# Patient Record
Sex: FEMALE | Race: BLACK OR AFRICAN AMERICAN | NOT HISPANIC OR LATINO | Employment: OTHER | URBAN - METROPOLITAN AREA
[De-identification: names, ages, dates, MRNs, and addresses within clinical notes are randomized per-mention and may not be internally consistent; named-entity substitution may affect disease eponyms.]

---

## 2017-06-04 ENCOUNTER — HOSPITAL ENCOUNTER (EMERGENCY)
Facility: CLINIC | Age: 37
Discharge: HOME | End: 2017-06-05
Attending: EMERGENCY MEDICINE | Admitting: EMERGENCY MEDICINE

## 2017-06-04 ENCOUNTER — APPOINTMENT (OUTPATIENT)
Dept: GENERAL RADIOLOGY | Facility: CLINIC | Age: 37
End: 2017-06-04
Attending: EMERGENCY MEDICINE

## 2017-06-04 VITALS
RESPIRATION RATE: 16 BRPM | TEMPERATURE: 98.5 F | HEIGHT: 70 IN | SYSTOLIC BLOOD PRESSURE: 128 MMHG | BODY MASS INDEX: 28.63 KG/M2 | OXYGEN SATURATION: 98 % | DIASTOLIC BLOOD PRESSURE: 84 MMHG | WEIGHT: 200 LBS

## 2017-06-04 DIAGNOSIS — J01.90 ACUTE NON-RECURRENT SINUSITIS, UNSPECIFIED LOCATION: ICD-10-CM

## 2017-06-04 DIAGNOSIS — J20.8 ACUTE VIRAL BRONCHITIS: ICD-10-CM

## 2017-06-04 PROCEDURE — 25000125 ZZHC RX 250: Performed by: EMERGENCY MEDICINE

## 2017-06-04 PROCEDURE — 25000130 H RX MED GY IP 250 OP 259 PS 637: Performed by: EMERGENCY MEDICINE

## 2017-06-04 PROCEDURE — 71020 XR CHEST 2 VW: CPT

## 2017-06-04 PROCEDURE — 99283 EMERGENCY DEPT VISIT LOW MDM: CPT | Mod: 25

## 2017-06-04 PROCEDURE — 25000132 ZZH RX MED GY IP 250 OP 250 PS 637: Performed by: EMERGENCY MEDICINE

## 2017-06-04 PROCEDURE — 94640 AIRWAY INHALATION TREATMENT: CPT

## 2017-06-04 RX ORDER — OXYMETAZOLINE HYDROCHLORIDE 0.05 G/100ML
2 SPRAY NASAL ONCE
Status: COMPLETED | OUTPATIENT
Start: 2017-06-04 | End: 2017-06-04

## 2017-06-04 RX ORDER — OXYMETAZOLINE HYDROCHLORIDE 0.05 G/100ML
2 SPRAY NASAL 2 TIMES DAILY
Qty: 1 BOTTLE | Refills: 0 | Status: SHIPPED | OUTPATIENT
Start: 2017-06-04 | End: 2017-06-07

## 2017-06-04 RX ORDER — IPRATROPIUM BROMIDE AND ALBUTEROL SULFATE 2.5; .5 MG/3ML; MG/3ML
3 SOLUTION RESPIRATORY (INHALATION) ONCE
Status: COMPLETED | OUTPATIENT
Start: 2017-06-04 | End: 2017-06-04

## 2017-06-04 RX ORDER — BENZONATATE 200 MG/1
200 CAPSULE ORAL 3 TIMES DAILY PRN
Qty: 21 CAPSULE | Refills: 0 | Status: SHIPPED | OUTPATIENT
Start: 2017-06-04 | End: 2022-02-09

## 2017-06-04 RX ORDER — GUAIFENESIN/DEXTROMETHORPHAN 100-10MG/5
5-10 SYRUP ORAL 4 TIMES DAILY PRN
Qty: 560 ML | Refills: 0 | Status: SHIPPED | OUTPATIENT
Start: 2017-06-04 | End: 2022-02-09

## 2017-06-04 RX ADMIN — OXYMETAZOLINE HYDROCHLORIDE 2 SPRAY: 5 SPRAY NASAL at 23:10

## 2017-06-04 RX ADMIN — Medication 10 MG: at 23:21

## 2017-06-04 RX ADMIN — IPRATROPIUM BROMIDE AND ALBUTEROL SULFATE 3 ML: .5; 3 SOLUTION RESPIRATORY (INHALATION) at 23:10

## 2017-06-04 NOTE — ED AVS SNAPSHOT
[facility] Emergency Department    [address]    [facility] [address]    Phone:  [phone]    Fax:  [phone]                                       Manuel Phillip   MRN: [id number]    Department:  [facility] Emergency Department   Date of Visit:  6/4/2017           After Visit Summary Signature Page     I have received my discharge instructions, and my questions have been answered. I have discussed any challenges I see with this plan with the nurse or doctor.    ..........................................................................................................................................  Patient/Patient Representative Signature      ..........................................................................................................................................  Patient Representative Print Name and Relationship to Patient    ..................................................               ................................................  Date                                            Time    ..........................................................................................................................................  Reviewed by Signature/Title    ...................................................              ..............................................  Date                                                            Time

## 2017-06-04 NOTE — ED AVS SNAPSHOT
[facility] Emergency Department    [address] [place] [address]    Phone:  [phone]    Fax:  [phone]                                       Manuel Phillip   MRN: [id number]    Department:  [facility] Emergency Department   Date of Visit:  6/4/2017           Patient Information     Date Of Birth          1980        Your diagnoses for this visit were:     Acute viral bronchitis     Acute non-recurrent sinusitis, unspecified location        You were seen by Adair Ott MD.        Discharge Instructions       Please make an appointment to follow up with your primary care provider in 3-5 days if not improving.    Discharge Instructions  Bronchitis, Pneumonia, Bronchospasm    You were seen today for a chest infection or inflammation. If your doctor decided this was due to a bacterial infection, you may need an antibiotic. Sometimes these are caused by a virus, and then an antibiotic will not help.     Return to the Emergency Department if:    Your breathing gets much worse.    You are very weak, or feel much more ill.    You develop new symptoms, such as chest pain.    You cough up blood.    You are vomiting enough that you can t keep fluids or your medicine down.    What can I do to help myself?    Fill any prescriptions the doctor gave you and take them right away--especially antibiotics. Be sure to finish the whole antibiotic prescription.    You may be given a prescription for an inhaler, which can help loosen tight air passages.  Use this as needed, but not more often than directed. Inhalers work much better when used with a spacer.     You may be given a prescription for a steroid to reduce inflammation. Used long-term, these can have many serious side effects, but for short courses these do not happen. You may notice restlessness or increased appetite.        You may use non-prescription cough or cold medicines. Cough medicines may help, but  "don t make the cough go away completely.     Avoid smoke, because this can make your symptoms worse. If you smoke, this may be a good time to quit! Consider using nicotine lozenges, gum, or patches to reduce cravings.     If you have a fever, Tylenol  (acetaminophen), Motrin  (ibuprofen), or Advil  (ibuprofen) may help bring fever down and may help you feel more comfortable. Be sure to read and follow the package directions, and ask your doctor if you have questions.    Be sure to get your flu shot each year.  The pneumonia shot can help prevent pneumonia.  Probiotics: If you have been given an antibiotic, you may want to also take a probiotic pill or eat yogurt with live cultures. Probiotics have \"good bacteria\" to help your intestines stay healthy. Studies have shown that probiotics help prevent diarrhea and other intestine problems (including C. diff infection) when you take antibiotics. You can buy these without a prescription in the pharmacy section of the store.     If your doctor has told you to follow-up at your clinic, be sure to call right away and go to your appointment.  If there is any problem with keeping your appointment, call your doctor or return to the Emergency Department.    If you were given a prescription for medicine here today, be sure to read all of the information (including the package insert) that comes with your prescription.  This will include important information about the medicine, its side effects, and any warnings that you need to know about.  The pharmacist who fills the prescription can provide more information and answer questions you may have about the medicine.  If you have questions or concerns that the pharmacist cannot address, please call or return to the Emergency Department.       Remember that you can always come back to the Emergency Department if you are not able to see your regular doctor in the amount of time listed above, if you get any new symptoms, or if there is " anything that worries you.      Discharge Instructions  Sinus Infection    You have acute sinusitis, or an infection of the sinuses. The sinuses are the hollow areas within the facial bones that are connected to the nasal opening. The most common cause of acute sinusitis is a virus infection associated with the common cold. Bacterial sinusitis occurs much less commonly, usually as a complication of viral sinusitis. Experts say that most sinusitis is caused by a virus within the first 7-10 days of illness. Antibiotics do nothing to help with virus infections, so most people do not need antibiotics for acute sinusitis.     Return to the Emergency Department if:    Your vision changes.    You are confused or have difficulty thinking clearly.    You have swelling around your eye.    You develop a severe headache or neck stiffness.    You have a fever over 101 degrees.    Your symptoms get worse and you are unable to see your primary doctor.    Follow-up with your doctor:     See your primary doctor in one week if not improving.    Treatment:    Pain relief -- Non-prescription pain medications, such as Tylenol  (acetaminophen) or Motrin  or Advil  (ibuprofen) are recommended for pain.  Do not use a medicine that you are allergic to, or if your doctor has told you not to use it.       Nasal irrigation -- Flushing the nose and sinuses with a saline solution several times per day can help to decrease pain caused by congestion.    Nasal decongestants -- Nasal decongestant sprays, including Afrin  (oxymetazoline) and Mark-Synephrine  (phenylephrine) can be used to temporarily treat congestion. However, these sprays should not be used for more than two to three days due to the risk of rebound congestion (when the nose is congested constantly unless the medication is used repeatedly).    Nasal glucocorticoids -- These are prescription steroids delivered by a nasal spray that can help to reduce swelling inside the nose, usually  "within two to three days. These drugs have few side effects and dramatically relieve symptoms in most people.  If you use these in conjunction with Afrin  you will need to use at least 15 minutes prior to the nasal decongestant.      Do I need an antibiotic? -- Sometimes, but not always, antibiotics are used along with the above treatments.    Antibiotic Warning:     If you have been placed on antibiotics - watch for signs of allergic reaction.  These include rash, lip swelling, difficulty breathing, wheezing, and dizziness.  If you develop any of these symptoms, stop the antibiotic immediately and go to an Emergency Department or Urgent Care for evaluation.    Probiotics: If you have been given an antibiotic, you may want to also take a probiotic pill or eat yogurt with live cultures. Probiotics have \"good bacteria\" to help your intestines stay healthy. Studies have shown that probiotics help prevent diarrhea and other intestine problems (including C. diff infection) when you take antibiotics. You can buy these without a prescription in the pharmacy section of the store.   If you were given a prescription for medicine here today, be sure to read all of the information (including the package insert) that comes with your prescription.  This will include important information about the medicine, its side effects, and any warnings that you need to know about.  The pharmacist who fills the prescription can provide more information and answer questions you may have about the medicine.  If you have questions or concerns that the pharmacist cannot address, please call or return to the Emergency Department.       Remember that you can always come back to the Emergency Department if you are not able to see your regular doctor in the amount of time listed above, if you get any new symptoms, or if there is anything that worries you.        24 Hour Appointment Hotline       To make an appointment at any [facility], call " [phone] ([phone]). If you don't have a family doctor or clinic, we will help you find one. [place] clinics are conveniently located to serve the needs of you and your family.             Review of your medicines      START taking        Dose / Directions Last dose taken    benzonatate 200 MG capsule   Commonly known as:  TESSALON   Dose:  200 mg   Quantity:  21 capsule        Take 1 capsule (200 mg) by mouth 3 times daily as needed for cough   Refills:  0        guaiFENesin-dextromethorphan 100-10 MG/5ML syrup   Commonly known as:  ROBITUSSIN DM   Dose:  5-10 mL   Quantity:  560 mL        Take 5-10 mLs by mouth 4 times daily as needed for cough   Refills:  0        oxymetazoline 0.05 % spray   Commonly known as:  AFRIN NASAL SPRAY   Dose:  2 spray   Quantity:  1 Bottle        Spray 2 sprays in nostril 2 times daily for 3 days   Refills:  0          Our records show that you are taking the medicines listed below. If these are incorrect, please call your family doctor or clinic.        Dose / Directions Last dose taken    vitamin D 2000 UNITS tablet   Dose:  2000 Units   Quantity:  100 tablet        Take 2,000 Units by mouth daily   Refills:  1                Prescriptions were sent or printed at these locations (3 Prescriptions)                   Other Prescriptions                Printed at Department/Unit printer (3 of 3)         oxymetazoline (AFRIN NASAL SPRAY) 0.05 % spray               benzonatate (TESSALON) 200 MG capsule               guaiFENesin-dextromethorphan (ROBITUSSIN DM) 100-10 MG/5ML syrup                Procedures and tests performed during your visit     XR Chest 2 Views      Orders Needing Specimen Collection     None      Pending Results     No orders found from 6/2/2017 to 6/5/2017.            Pending Culture Results     No orders found from 6/2/2017 to 6/5/2017.            Pending Results Instructions     If you had any lab results that were not finalized at the time of your  Discharge, you can call the ED Lab Result RN at [phone]. You will be contacted by this team for any positive Lab results or changes in treatment. The nurses are available 7 days a week from 10A to 6:30P.  You can leave a message 24 hours per day and they will return your call.        Test Results From Your Hospital Stay        6/4/2017 11:41 PM      Narrative     CHEST 2 VIEWS   6/4/2017 11:35 PM     HISTORY: Cough.    COMPARISON: 8/22/2014.    FINDINGS: The lungs are clear. Normal-sized cardiac silhouette.  Surgical clips in the upper right hemiabdomen, likely related to prior  cholecystectomy.        Impression     IMPRESSION: No convincing evidence of active cardiopulmonary disease.    GRACIE FOOTE MD                Clinical Quality Measure: Blood Pressure Screening     Your blood pressure was checked while you were in the emergency department today. The last reading we obtained was  BP: 128/84 . Please read the guidelines below about what these numbers mean and what you should do about them.  If your systolic blood pressure (the top number) is less than 120 and your diastolic blood pressure (the bottom number) is less than 80, then your blood pressure is normal. There is nothing more that you need to do about it.  If your systolic blood pressure (the top number) is 120-139 or your diastolic blood pressure (the bottom number) is 80-89, your blood pressure may be higher than it should be. You should have your blood pressure rechecked within a year by a primary care provider.  If your systolic blood pressure (the top number) is 140 or greater or your diastolic blood pressure (the bottom number) is 90 or greater, you may have high blood pressure. High blood pressure is treatable, but if left untreated over time it can put you at risk for heart attack, stroke, or kidney failure. You should have your blood pressure rechecked by a primary care provider within the next 4 weeks.  If your provider in the emergency  "department today gave you specific instructions to follow-up with your doctor or provider even sooner than that, you should follow that instruction and not wait for up to 4 weeks for your follow-up visit.        Thank you for choosing [facility]       Thank you for choosing [facility] for your care. Our goal is always to provide you with excellent care. Hearing back from our patients is one way we can continue to improve our services. Please take a few minutes to complete the written survey that you may receive in the mail after you visit with us. Thank you!        [employer] Information     [employer] lets you send messages to your doctor, view your test results, renew your prescriptions, schedule appointments and more. To sign up, go to [email or web address] . Click on \"Log in\" on the left side of the screen, which will take you to the Welcome page. Then click on \"Sign up Now\" on the right side of the page.     You will be asked to enter the access code listed below, as well as some personal information. Please follow the directions to create your username and password.     Your access code is: [email or web address]  Expires: 2017 11:48 PM     Your access code will  in 90 days. If you need help or a new code, please call your [facility] clinic or [phone].        Care EveryWhere ID     This is your Care EveryWhere ID. This could be used by other organizations to access your [facility] medical records  [id number]        After Visit Summary       This is your record. Keep this with you and show to your community pharmacist(s) and doctor(s) at your next visit.                  "

## 2017-06-05 ASSESSMENT — ENCOUNTER SYMPTOMS
SHORTNESS OF BREATH: 1
MYALGIAS: 1
BACK PAIN: 1
RHINORRHEA: 1
FEVER: 1
SINUS PRESSURE: 1

## 2017-06-05 NOTE — DISCHARGE INSTRUCTIONS
Please make an appointment to follow up with your primary care provider in 3-5 days if not improving.    Discharge Instructions  Bronchitis, Pneumonia, Bronchospasm    You were seen today for a chest infection or inflammation. If your doctor decided this was due to a bacterial infection, you may need an antibiotic. Sometimes these are caused by a virus, and then an antibiotic will not help.     Return to the Emergency Department if:    Your breathing gets much worse.    You are very weak, or feel much more ill.    You develop new symptoms, such as chest pain.    You cough up blood.    You are vomiting enough that you can t keep fluids or your medicine down.    What can I do to help myself?    Fill any prescriptions the doctor gave you and take them right away--especially antibiotics. Be sure to finish the whole antibiotic prescription.    You may be given a prescription for an inhaler, which can help loosen tight air passages.  Use this as needed, but not more often than directed. Inhalers work much better when used with a spacer.     You may be given a prescription for a steroid to reduce inflammation. Used long-term, these can have many serious side effects, but for short courses these do not happen. You may notice restlessness or increased appetite.        You may use non-prescription cough or cold medicines. Cough medicines may help, but don t make the cough go away completely.     Avoid smoke, because this can make your symptoms worse. If you smoke, this may be a good time to quit! Consider using nicotine lozenges, gum, or patches to reduce cravings.     If you have a fever, Tylenol  (acetaminophen), Motrin  (ibuprofen), or Advil  (ibuprofen) may help bring fever down and may help you feel more comfortable. Be sure to read and follow the package directions, and ask your doctor if you have questions.    Be sure to get your flu shot each year.  The pneumonia shot can help prevent pneumonia.  Probiotics: If you  "have been given an antibiotic, you may want to also take a probiotic pill or eat yogurt with live cultures. Probiotics have \"good bacteria\" to help your intestines stay healthy. Studies have shown that probiotics help prevent diarrhea and other intestine problems (including C. diff infection) when you take antibiotics. You can buy these without a prescription in the pharmacy section of the store.     If your doctor has told you to follow-up at your clinic, be sure to call right away and go to your appointment.  If there is any problem with keeping your appointment, call your doctor or return to the Emergency Department.    If you were given a prescription for medicine here today, be sure to read all of the information (including the package insert) that comes with your prescription.  This will include important information about the medicine, its side effects, and any warnings that you need to know about.  The pharmacist who fills the prescription can provide more information and answer questions you may have about the medicine.  If you have questions or concerns that the pharmacist cannot address, please call or return to the Emergency Department.       Remember that you can always come back to the Emergency Department if you are not able to see your regular doctor in the amount of time listed above, if you get any new symptoms, or if there is anything that worries you.      Discharge Instructions  Sinus Infection    You have acute sinusitis, or an infection of the sinuses. The sinuses are the hollow areas within the facial bones that are connected to the nasal opening. The most common cause of acute sinusitis is a virus infection associated with the common cold. Bacterial sinusitis occurs much less commonly, usually as a complication of viral sinusitis. Experts say that most sinusitis is caused by a virus within the first 7-10 days of illness. Antibiotics do nothing to help with virus infections, so most people do " not need antibiotics for acute sinusitis.     Return to the Emergency Department if:    Your vision changes.    You are confused or have difficulty thinking clearly.    You have swelling around your eye.    You develop a severe headache or neck stiffness.    You have a fever over 101 degrees.    Your symptoms get worse and you are unable to see your primary doctor.    Follow-up with your doctor:     See your primary doctor in one week if not improving.    Treatment:    Pain relief -- Non-prescription pain medications, such as Tylenol  (acetaminophen) or Motrin  or Advil  (ibuprofen) are recommended for pain.  Do not use a medicine that you are allergic to, or if your doctor has told you not to use it.       Nasal irrigation -- Flushing the nose and sinuses with a saline solution several times per day can help to decrease pain caused by congestion.    Nasal decongestants -- Nasal decongestant sprays, including Afrin  (oxymetazoline) and Mark-Synephrine  (phenylephrine) can be used to temporarily treat congestion. However, these sprays should not be used for more than two to three days due to the risk of rebound congestion (when the nose is congested constantly unless the medication is used repeatedly).    Nasal glucocorticoids -- These are prescription steroids delivered by a nasal spray that can help to reduce swelling inside the nose, usually within two to three days. These drugs have few side effects and dramatically relieve symptoms in most people.  If you use these in conjunction with Afrin  you will need to use at least 15 minutes prior to the nasal decongestant.      Do I need an antibiotic? -- Sometimes, but not always, antibiotics are used along with the above treatments.    Antibiotic Warning:     If you have been placed on antibiotics - watch for signs of allergic reaction.  These include rash, lip swelling, difficulty breathing, wheezing, and dizziness.  If you develop any of these symptoms, stop the  "antibiotic immediately and go to an Emergency Department or Urgent Care for evaluation.    Probiotics: If you have been given an antibiotic, you may want to also take a probiotic pill or eat yogurt with live cultures. Probiotics have \"good bacteria\" to help your intestines stay healthy. Studies have shown that probiotics help prevent diarrhea and other intestine problems (including C. diff infection) when you take antibiotics. You can buy these without a prescription in the pharmacy section of the store.   If you were given a prescription for medicine here today, be sure to read all of the information (including the package insert) that comes with your prescription.  This will include important information about the medicine, its side effects, and any warnings that you need to know about.  The pharmacist who fills the prescription can provide more information and answer questions you may have about the medicine.  If you have questions or concerns that the pharmacist cannot address, please call or return to the Emergency Department.       Remember that you can always come back to the Emergency Department if you are not able to see your regular doctor in the amount of time listed above, if you get any new symptoms, or if there is anything that worries you.      "

## 2017-06-05 NOTE — ED PROVIDER NOTES
"  History     Chief Complaint:  Cough and Shortness of Breath    HPI   Manuel Phillip is a 37 year old female with a history of bronchitis and pneumonia who presents to the emergency department today for evaluation of a cough and shortness of breath. The patient has been having cold-like symptoms that began in her nasal sinuses and then spread into her chest for which she has associated shortness of breath. The patient used her Ventolin inhaler, which provided minimal relief. She has had a subjective fever, whole body ache, muscle ache, and back pain. The patient has not had any changes in bladder habits.    Allergies:  No Known Drug Allergies     Medications:    Ventolin  Vitamin D     Past Medical History:    Bronchitis    Past Surgical History:    Genital Surgery  Cholecystectomy    Family History:    History reviewed. No pertinent family history.     Social History:  The patient was accompanied to the ED by her .  Marital Status:   [2]     Review of Systems   Constitutional: Positive for fever.   HENT: Positive for congestion, rhinorrhea and sinus pressure.    Respiratory: Positive for shortness of breath.    Musculoskeletal: Positive for back pain and myalgias (whole body).     Physical Exam   Vitals  Patient Vitals for the past 24 hrs:   BP Temp Heart Rate Resp SpO2 Height Weight   06/04/17 2227 128/84 98.5  F (36.9  C) 86 16 98 % 1.778 m (5' 10\") 90.7 kg (200 lb)     Physical Exam   HENT:   Right Ear: External ear normal.   Left Ear: External ear normal.   Nose: Nose normal.   Mouth/Throat: No oropharyngeal exudate.   Eyes: Conjunctivae and lids are normal.   Neck: Neck supple. No tracheal deviation present.   Cardiovascular: Regular rhythm and intact distal pulses.    Pulmonary/Chest: Breath sounds normal. No respiratory distress. She has no wheezes. She has no rales.   Abdominal: Soft. There is no tenderness. There is no rebound and no guarding.   Musculoskeletal:   No peripheral edema "   Neurological:   MAEE, no gross focal motor or sensory deficit   Skin: Skin is warm and dry. She is not diaphoretic.   Psychiatric: She has a normal mood and affect.   Nursing note and vitals reviewed.      Emergency Department Course     Imaging:  Radiology findings were communicated with the patient and her  who voiced understanding of the findings.    Chest X-ray, 2 views  No convincing evidence of active cardiopulmonary disease.  Reading per radiology    Interventions:  2310 Afrin 0.05% (2 dose) Nasal spray  2310 Albuterol 3 ml nebulizer  2321 Decadron 10 mg PO     Emergency Department Course:  Nursing notes and vitals reviewed.  I performed an exam of the patient as documented above.     The patient was sent for a chest x-ray while in the emergency department, results above.     I discussed the treatment plan with the patient. She expressed understanding of this plan and consented to discharge. She will be discharged home with instructions for care and follow up. In addition, the patient will return to the emergency department if their symptoms persist, worsen, if new symptoms arise or if there is any concern.  All questions were answered.  I personally reviewed the imaging results with the patient and answered all related questions prior to discharge.    Impression & Plan      Medical Decision Making:  Manuel Phillip is a 37 year old female who presents to the emergency department today for evaluation of respiratory tract infectious symptoms. Chest x-ray is negative for pneumonia. Likely viral bronchitis and sinusitis and we will treat supportively.     Diagnosis:    ICD-10-CM    1. Acute viral bronchitis J20.8    2. Acute non-recurrent sinusitis, unspecified location J01.90        Discharge Medications:  oxymetazoline (AFRIN NASAL SPRAY) 0.05 % spray Spray 2 sprays in nostril 2 times daily for 3 days, Disp-1 Bottle, R-0, Local PrintNo drip 12 hour formula please      benzonatate (TESSALON) 200 MG capsule  Take 1 capsule (200 mg) by mouth 3 times daily as needed for cough, Disp-21 capsule, R-0, Local Print      guaiFENesin-dextromethorphan (ROBITUSSIN DM) 100-10 MG/5ML syrup Take 5-10 mLs by mouth 4 times daily as needed for cough, Disp-560 mL, R-0, Local Print       Scribe Disclosure:  I, Richi Shepherd, am serving as a scribe at 12:45 AM on 6/5/2017 to document services personally performed by Adair Ott MD, , based on my observations and the provider's statements to me.    6/4/2017   [facility] EMERGENCY DEPARTMENT         Adair Ott MD  06/05/17 0714

## 2017-06-05 NOTE — ED NOTES
Patient presents to ED due multiple c/o. Reports cough, SOB, sinus pain and HA . Onset of symptoms developed 2 days ago. Used inhaler with minimal relief.     Denies taking OTC

## 2017-08-05 ENCOUNTER — HEALTH MAINTENANCE LETTER (OUTPATIENT)
Age: 37
End: 2017-08-05

## 2017-10-13 ENCOUNTER — HOSPITAL ENCOUNTER (OUTPATIENT)
Dept: ULTRASOUND IMAGING | Facility: CLINIC | Age: 37
Discharge: HOME | End: 2017-10-13
Attending: OBSTETRICS & GYNECOLOGY | Admitting: OBSTETRICS & GYNECOLOGY

## 2017-10-13 DIAGNOSIS — R10.2 PELVIC PAIN: ICD-10-CM

## 2017-10-13 PROCEDURE — 76830 TRANSVAGINAL US NON-OB: CPT

## 2019-11-25 ENCOUNTER — HOSPITAL ENCOUNTER (EMERGENCY)
Facility: CLINIC | Age: 39
Discharge: HOME | End: 2019-11-26
Attending: EMERGENCY MEDICINE | Admitting: EMERGENCY MEDICINE

## 2019-11-25 VITALS
TEMPERATURE: 100.1 F | HEIGHT: 71 IN | RESPIRATION RATE: 20 BRPM | WEIGHT: 200 LBS | SYSTOLIC BLOOD PRESSURE: 142 MMHG | HEART RATE: 94 BPM | BODY MASS INDEX: 28 KG/M2 | OXYGEN SATURATION: 99 % | DIASTOLIC BLOOD PRESSURE: 72 MMHG

## 2019-11-25 DIAGNOSIS — J18.9 PNEUMONIA OF LEFT LOWER LOBE DUE TO INFECTIOUS ORGANISM: ICD-10-CM

## 2019-11-25 LAB
FLUAV+FLUBV AG SPEC QL: NEGATIVE
FLUAV+FLUBV AG SPEC QL: NEGATIVE
SPECIMEN SOURCE: NORMAL

## 2019-11-25 PROCEDURE — 25000132 ZZH RX MED GY IP 250 OP 250 PS 637: Performed by: EMERGENCY MEDICINE

## 2019-11-25 PROCEDURE — 87804 INFLUENZA ASSAY W/OPTIC: CPT | Performed by: EMERGENCY MEDICINE

## 2019-11-25 PROCEDURE — 99284 EMERGENCY DEPT VISIT MOD MDM: CPT | Mod: 25

## 2019-11-25 PROCEDURE — 25000128 H RX IP 250 OP 636: Performed by: EMERGENCY MEDICINE

## 2019-11-25 RX ORDER — ONDANSETRON 4 MG/1
4 TABLET, ORALLY DISINTEGRATING ORAL ONCE
Status: COMPLETED | OUTPATIENT
Start: 2019-11-25 | End: 2019-11-25

## 2019-11-25 RX ORDER — ACETAMINOPHEN 500 MG
1000 TABLET ORAL ONCE
Status: COMPLETED | OUTPATIENT
Start: 2019-11-25 | End: 2019-11-25

## 2019-11-25 RX ADMIN — ONDANSETRON 4 MG: 4 TABLET, ORALLY DISINTEGRATING ORAL at 23:24

## 2019-11-25 RX ADMIN — ACETAMINOPHEN 1000 MG: 500 TABLET, FILM COATED ORAL at 23:24

## 2019-11-25 ASSESSMENT — ENCOUNTER SYMPTOMS
MYALGIAS: 1
SORE THROAT: 0
NAUSEA: 1
HEADACHES: 1
ARTHRALGIAS: 1
VOMITING: 0
COUGH: 1
FEVER: 1
DIARRHEA: 0

## 2019-11-25 ASSESSMENT — MIFFLIN-ST. JEOR: SCORE: 1683.32

## 2019-11-25 NOTE — ED AVS SNAPSHOT
[facility] Emergency Department  [address]  [facility] [address]  Phone:  [phone]  Fax:  [phone]                                    Manuel Phillip   MRN: [id number]    Department:  [facility] Emergency Department   Date of Visit:  11/25/2019           After Visit Summary Signature Page    I have received my discharge instructions, and my questions have been answered. I have discussed any challenges I see with this plan with the nurse or doctor.    ..........................................................................................................................................  Patient/Patient Representative Signature      ..........................................................................................................................................  Patient Representative Print Name and Relationship to Patient    ..................................................               ................................................  Date                                   Time    ..........................................................................................................................................  Reviewed by Signature/Title    ...................................................              ..............................................  Date                                               Time          22EPIC Rev 08/18

## 2019-11-26 ENCOUNTER — APPOINTMENT (OUTPATIENT)
Dept: GENERAL RADIOLOGY | Facility: CLINIC | Age: 39
End: 2019-11-26
Attending: EMERGENCY MEDICINE

## 2019-11-26 PROCEDURE — 71046 X-RAY EXAM CHEST 2 VIEWS: CPT

## 2019-11-26 RX ORDER — AMOXICILLIN 500 MG/1
1000 CAPSULE ORAL 3 TIMES DAILY
Qty: 60 CAPSULE | Refills: 0 | Status: SHIPPED | OUTPATIENT
Start: 2019-11-26 | End: 2019-12-06

## 2019-11-26 RX ORDER — IBUPROFEN 600 MG/1
600 TABLET, FILM COATED ORAL EVERY 8 HOURS PRN
Qty: 30 TABLET | Refills: 0 | Status: SHIPPED | OUTPATIENT
Start: 2019-11-26 | End: 2019-12-26

## 2019-11-26 RX ORDER — AZITHROMYCIN 250 MG/1
TABLET, FILM COATED ORAL
Qty: 6 TABLET | Refills: 0 | Status: SHIPPED | OUTPATIENT
Start: 2019-11-26 | End: 2019-12-01

## 2019-11-26 NOTE — ED TRIAGE NOTES
2 days of fever, cough, pain with cough, feeling SOB, nausea, muscle ache, headache, joint pain. Pt hasn't gotten flu shot.

## 2019-11-26 NOTE — ED PROVIDER NOTES
"  History     Chief Complaint:  Flu Symptoms    HPI   Manuel Phillip is a 39 year old female who presents to the emergency department for evaluation of flu symptoms. The patient reports 2 days of fever, cough with associated chest pain, headache, nausea, myalgias, and arthralgias. She denies sore throat, ear pain, vomiting, or diarrhea. She took 2 tablets of Advil this morning for symptom management. The patient reports her mother is sick and was recently diagnosed with pneumonia. She did not get the flu vaccine this year. She has not had a tonsillectomy.  No abdominal pain.  No rash.      Allergies:  NKDA      Medications:    The patient is not currently taking any prescribed medications.     Past Medical History:    Vitamin D deficiency     Past Surgical History:    Female genital surgical procedure  Cholecystectomy      Family History:    No past pertinent family history.      Social History:  The patient was accompanied to the ED by her daughter.  Smoking Status: Never Smoker  Smokeless Tobacco: Never Used  Alcohol Use: Negative   Drug Use: Negative   Marital Status:   [2]      Review of Systems   Constitutional: Positive for fever.   HENT: Negative for ear pain and sore throat.    Respiratory: Positive for cough.    Cardiovascular: Positive for chest pain.   Gastrointestinal: Positive for nausea. Negative for diarrhea and vomiting.   Musculoskeletal: Positive for arthralgias and myalgias.   Neurological: Positive for headaches.   All other systems reviewed and are negative.      Physical Exam     Patient Vitals for the past 24 hrs:   BP Temp Temp src Pulse Resp SpO2 Height Weight   11/25/19 2226 (!) 142/72 -- -- 94 -- -- -- --   11/25/19 2225 -- 100.1  F (37.8  C) Oral -- 20 99 % 1.803 m (5' 11\") 90.7 kg (200 lb)       Physical Exam  General: Well appearing, nontoxic.  Resting comfortably  Head:  Scalp, face, and head appear normal  Eyes:  Pupils are equal, round, and reactive to light    Conjunctivae " non-injected and sclerae white  ENT:    The external nose is normal    Pinnae are normal. Bilateral TMs clear without erythema, bulging, or effusion. Auditory canals normal.     The oropharynx is normal, mucous membranes moist. Posterior pharynx clear without swelling, exudates or erythema     Uvula is in the midline  Neck:  Normal range of motion    There is no rigidity noted    Trachea is in the midline  CV:  Regular rate and rhythm     Normal S1/S2, no S3/S4    No murmur or rub  Resp:  Lungs are clear and equal bilaterally    There is no tachypnea    No increased work of breathing    No rales, wheezing, or rhonchi  GI:  Abdomen is soft, obese, no rigidity or guarding    No distension, or mass    No tenderness or rebound tenderness   MS:  Normal muscular tone    Symmetric motor strength    No lower extremity edema  Skin:  No rash or acute skin lesions noted  Neuro: Awake and alert    Speech is normal and fluent    Moves all extremities spontaneously  Psych:  Normal affect.  Appropriate interactions.      Emergency Department Course     Imaging:  Radiographic findings were communicated with the patient who voiced understanding of the findings.    XR Chest 2 Views  IMPRESSION: A small region of patchy opacities in the left lung base and a small patchy nodular opacity upper right lung are nonspecific, but could represent pneumonia. A follow-up chest radiograph would be useful in one month to confirm resolution. Read by Radiology.       Laboratory:  Influenza A/B antigen: both Negative      Interventions:  2324 Tylenol 1000 mg PO  2324 Zofran 4 mg PO     Emergency Department Course:   Past medical records, nursing notes, and vitals reviewed.  2312: I performed an exam of the patient and obtained history, as documented above.     The patient was tested for influenza while in the emergency department     The patient was sent for a XR Chest while in the emergency department, results above.      Medication administered.       0044 I rechecked and updated the patient.     Findings and plan explained to the Patient. Patient discharged home with instructions regarding supportive care, medications, and reasons to return. The importance of close follow-up was reviewed. The patient was prescribed acetaminophen, amoxicillin, azithromycin, and ibuprofen.     I personally reviewed the laboratory results with the Patient and answered all related questions prior to discharge.      Impression & Plan        Medical Decision Making:  Manuel Phillip is a 39 year old female who presents for evaluation of fever, cough, headache, nausea, myalgias, and arthralgias.  History, physical exam and imaging studies are consistent with pneumonia.  There are no signs of complications of pneumonia at this point such as septic shock, bacteremia, empyema, hypoxia, respiratory failure or compromise.  Rapid influenza testing is negative.  Supportive outpatient management is therefore indicated.  No indication for hospitalization.  This seems to be community acquired pneumonia and there are no risk factors at this point for coverage of antibiotic-resistant strains.  I doubt PE, dissection, acute coronary syndrome, or other worrisome etiology for patients symptoms.  Patient be treated with amoxicillin and azithromycin.  Recommended close primary care follow-up in 2 to 3 days for reevaluation.  Tylenol ibuprofen as needed for fever body aches and pain.  Signs for return visit to ED were discussed with patient and pneumonia precautions given for home.  Return precautions were discussed with patient. The patient's questions were answered and the patient was agreeable with discharge.     Critical Care time:  none    Diagnosis:    ICD-10-CM    1. Pneumonia of left lower lobe due to infectious organism (H) J18.1        Disposition:  discharged to home    Discharge Medications:  New Prescriptions    ACETAMINOPHEN 500 MG CAPS    Take 2 capsules by mouth every 8 hours as needed  For aches, pain, fever    AMOXICILLIN (AMOXIL) 500 MG CAPSULE    Take 2 capsules (1,000 mg) by mouth 3 times daily for 10 days    AZITHROMYCIN (ZITHROMAX Z-NEVAEH) 250 MG TABLET    Two tablets on the first day, then one tablet daily for the next 4 days    IBUPROFEN (ADVIL/MOTRIN) 600 MG TABLET    Take 1 tablet (600 mg) by mouth every 8 hours as needed for fever or pain     I, Vikki Fletcher, am serving as a scribe on 11/25/2019 at 11:58 PM to personally document services performed by Ash Escobar MD based on my observations and the provider's statements to me.       Vikki Fletcher  11/25/2019   [facility] EMERGENCY DEPARTMENT       Ash Escobar MD  11/26/19 0409

## 2020-08-24 ENCOUNTER — HOSPITAL ENCOUNTER (EMERGENCY)
Facility: CLINIC | Age: 40
Discharge: HOME | End: 2020-08-24
Attending: EMERGENCY MEDICINE | Admitting: EMERGENCY MEDICINE
Payer: COMMERCIAL

## 2020-08-24 VITALS
RESPIRATION RATE: 18 BRPM | SYSTOLIC BLOOD PRESSURE: 138 MMHG | DIASTOLIC BLOOD PRESSURE: 92 MMHG | TEMPERATURE: 97.3 F | OXYGEN SATURATION: 99 % | HEART RATE: 83 BPM

## 2020-08-24 DIAGNOSIS — R68.84 JAW PAIN: ICD-10-CM

## 2020-08-24 PROCEDURE — 25000132 ZZH RX MED GY IP 250 OP 250 PS 637: Performed by: EMERGENCY MEDICINE

## 2020-08-24 PROCEDURE — 25000128 H RX IP 250 OP 636: Performed by: EMERGENCY MEDICINE

## 2020-08-24 PROCEDURE — 96372 THER/PROPH/DIAG INJ SC/IM: CPT

## 2020-08-24 PROCEDURE — 99284 EMERGENCY DEPT VISIT MOD MDM: CPT

## 2020-08-24 RX ORDER — HYDROCODONE BITARTRATE AND ACETAMINOPHEN 5; 325 MG/1; MG/1
2 TABLET ORAL ONCE
Status: COMPLETED | OUTPATIENT
Start: 2020-08-24 | End: 2020-08-24

## 2020-08-24 RX ORDER — PENICILLIN V POTASSIUM 500 MG/1
500 TABLET, FILM COATED ORAL ONCE
Status: COMPLETED | OUTPATIENT
Start: 2020-08-24 | End: 2020-08-24

## 2020-08-24 RX ORDER — PENICILLIN V POTASSIUM 500 MG/1
500 TABLET, FILM COATED ORAL 4 TIMES DAILY
Qty: 28 TABLET | Refills: 0 | Status: SHIPPED | OUTPATIENT
Start: 2020-08-24 | End: 2020-08-31

## 2020-08-24 RX ORDER — KETOROLAC TROMETHAMINE 30 MG/ML
30 INJECTION, SOLUTION INTRAMUSCULAR; INTRAVENOUS ONCE
Status: COMPLETED | OUTPATIENT
Start: 2020-08-24 | End: 2020-08-24

## 2020-08-24 RX ADMIN — HYDROCODONE BITARTRATE AND ACETAMINOPHEN 2 TABLET: 5; 325 TABLET ORAL at 01:21

## 2020-08-24 RX ADMIN — KETOROLAC TROMETHAMINE 30 MG: 30 INJECTION, SOLUTION INTRAMUSCULAR at 02:06

## 2020-08-24 RX ADMIN — PENICILLIN V POTASSIUM 500 MG: 500 TABLET, FILM COATED ORAL at 01:21

## 2020-08-24 ASSESSMENT — ENCOUNTER SYMPTOMS
FACIAL SWELLING: 0
TROUBLE SWALLOWING: 0
FEVER: 0
SORE THROAT: 0
CHILLS: 0

## 2020-08-24 NOTE — ED AVS SNAPSHOT
[facility] Emergency Department  [address]  [facility] [address]  Phone:  [phone]  Fax:  [phone]                                    Manuel Phillip   MRN: [id number]    Department:  [facility] Emergency Department   Date of Visit:  8/24/2020           After Visit Summary Signature Page    I have received my discharge instructions, and my questions have been answered. I have discussed any challenges I see with this plan with the nurse or doctor.    ..........................................................................................................................................  Patient/Patient Representative Signature      ..........................................................................................................................................  Patient Representative Print Name and Relationship to Patient    ..................................................               ................................................  Date                                   Time    ..........................................................................................................................................  Reviewed by Signature/Title    ...................................................              ..............................................  Date                                               Time          22EPIC Rev 08/18

## 2020-08-24 NOTE — ED PROVIDER NOTES
History     Chief Complaint:  Dental Pain    HPI   Manuel Phillip is a 40 year old female who presents with dental pain. The patient reports she has had intermittent dental pain however, tonight she has severe pain. She took ibuprofen with no alleviation. She notes she has a cavity on that tooth, but has not had any work done on the tooth. She denies facial swelling, sore throat, difficulty swallowing and breathing, fever, and chills.      Allergies:  No known drug allergies      Medications:    Tessalon   Cholecalciferol     Past Medical History:    Vitamin D deficiency  Secondary female infertility     Past Surgical History:    Female circumcision  Cholecystectomy, Laparoscopic     Family History:    History reviewed. No pertinent family history.      Social History:  Smoking status- never smoker  Alcohol use- no  Drug use- no   Marital Status:   [2]     Review of Systems   Constitutional: Negative for chills and fever.   HENT: Positive for dental problem. Negative for facial swelling, sore throat and trouble swallowing.    All other systems reviewed and are negative.    Physical Exam     Patient Vitals for the past 24 hrs:   Temp Temp src Pulse Resp SpO2   08/24/20 0050 97.3  F (36.3  C) Oral 83 18 99 %       Physical Exam  Constitutional: Alert, attentive  HENT:    Nose normal.    Posterior pharynx shows no edema or erythema   Normal midline uvula   No peritonsillar erythema or swelling   No sublingual induration, swelling or tenderness   No trismus   dental caries throughout; indicates pain to left upper molars but no tender tooth or gingival swelling   Able to extend neck without discomfort   Tolerating secretions and able to breathe supine with ease  Eyes: EOM are normal. Pupils are equal, round, and reactive to light.   CV: regular rate and rhythm; no murmurs, rubs or gallups  Chest: Effort normal and breath sounds normal.   GI:  There is no tenderness. No distension. Normal bowel sounds  MSK: Normal  range of motion.   Neurological: Alert, attentive  Skin: Skin is warm and dry.      Emergency Department Course     Interventions:  0121 Veetid 500 mg PO    0121 Norco 5-325 mg PO    Toradol 30 mg IM    Emergency Department Course:  Past medical records, nursing notes, and vitals reviewed.    0106 I performed an exam of the patient as documented above.      I rechecked the patient and discussed the results of her workup thus far.     Findings and plan explained to the Patient. Patient discharged home with instructions regarding supportive care, medications, and reasons to return. The importance of close follow-up was reviewed.     Impression & Plan     Medical Decision Making:  This is a very pleasant 40 year old female who presents with jaw pain.  Exam is consistent with probable dental abscess causing this pain.  There is no drainable fluid collection.  The patient has no evidence on exam of adelita angina, peritonsillar cellulitis or abscess, retropharyngeal abscess, pharyngitis, meniere's disease, or other more concerning oropharyngeal process.  The patient is well-hydrated and well-appearing.  The patient is able to breathe supine with ease, open the mouth fully, phonate normally and tolerate oral secretions, suggesting against any airway compromise or retropharyngeal infection. The patient will be discharged with N VK, with plan for dental follow-up in 1-2 days, and strict return precautions for worse pain, trismus, fever, difficulty breathing or swallowing, or any other concerning symptoms.      Diagnosis:    ICD-10-CM   1. Jaw pain  R68.84     Disposition:  Discharged to home.    Discharge Medications:  New Prescriptions    PENICILLIN V (VEETID) 500 MG TABLET    Take 1 tablet (500 mg) by mouth 4 times daily for 7 days     Scribe Disclosure:  Nancy BARILLAS, am serving as a scribe at 12:57 AM on 8/24/2020 to document services personally performed by Pankaj Blackwood MD based on my observations and the  provider's statements to me.       Pankaj Blackwood MD  08/24/20 5831

## 2020-08-24 NOTE — ED TRIAGE NOTES
Pt presents to ED due to dental pain.  Took aspirin PTA.  States she has a cavity that needs to be filled.  ABCs intact

## 2020-11-15 ENCOUNTER — HOSPITAL ENCOUNTER (EMERGENCY)
Facility: CLINIC | Age: 40
Discharge: HOME | End: 2020-11-15
Attending: EMERGENCY MEDICINE | Admitting: EMERGENCY MEDICINE
Payer: COMMERCIAL

## 2020-11-15 VITALS
OXYGEN SATURATION: 98 % | DIASTOLIC BLOOD PRESSURE: 81 MMHG | TEMPERATURE: 98.9 F | HEART RATE: 77 BPM | RESPIRATION RATE: 18 BRPM | SYSTOLIC BLOOD PRESSURE: 130 MMHG

## 2020-11-15 DIAGNOSIS — Z20.822 SUSPECTED COVID-19 VIRUS INFECTION: ICD-10-CM

## 2020-11-15 DIAGNOSIS — Z20.822 EXPOSURE TO COVID-19 VIRUS: ICD-10-CM

## 2020-11-15 PROCEDURE — 99283 EMERGENCY DEPT VISIT LOW MDM: CPT

## 2020-11-15 PROCEDURE — U0003 INFECTIOUS AGENT DETECTION BY NUCLEIC ACID (DNA OR RNA); SEVERE ACUTE RESPIRATORY SYNDROME CORONAVIRUS 2 (SARS-COV-2) (CORONAVIRUS DISEASE [COVID-19]), AMPLIFIED PROBE TECHNIQUE, MAKING USE OF HIGH THROUGHPUT TECHNOLOGIES AS DESCRIBED BY CMS-2020-01-R: HCPCS | Performed by: EMERGENCY MEDICINE

## 2020-11-15 PROCEDURE — C9803 HOPD COVID-19 SPEC COLLECT: HCPCS

## 2020-11-15 ASSESSMENT — ENCOUNTER SYMPTOMS
SHORTNESS OF BREATH: 1
FEVER: 0
DIARRHEA: 1
COUGH: 1

## 2020-11-15 NOTE — ED AVS SNAPSHOT
[facility] Emergency Dept  [address]  [facility] [address]  Phone: [phone]  Fax: [phone]                                    Manuel Phillip   MRN: [id number]    Department: [facility] Emergency Dept   Date of Visit: 11/15/2020           After Visit Summary Signature Page    I have received my discharge instructions, and my questions have been answered. I have discussed any challenges I see with this plan with the nurse or doctor.    ..........................................................................................................................................  Patient/Patient Representative Signature      ..........................................................................................................................................  Patient Representative Print Name and Relationship to Patient    ..................................................               ................................................  Date                                   Time    ..........................................................................................................................................  Reviewed by Signature/Title    ...................................................              ..............................................  Date                                               Time          22EPIC Rev 08/18

## 2020-11-16 LAB
SARS-COV-2 RNA SPEC QL NAA+PROBE: ABNORMAL
SPECIMEN SOURCE: ABNORMAL

## 2020-11-16 NOTE — ED PROVIDER NOTES
History     Chief Complaint:  Cough and Shortness of Breath     HPI   Manuel Phillip is a 40 year old female who presents with a cough and shortness of breath. Recently the patient's son tested positive for COVID-19. Three days ago, the patient started experiencing a cough with mucous, shortness of breath and diarrhea. She has taken Tylenol and Dayquil for her symptoms. She notes subjective fevers/chills, though has not measured her temperature.  Denies possibility of pregnancy.  Denies history of underlying lung or cardiac disease.    Allergies:  No Known Drug Allergies    Medications:    Tessalon  Vitamin D  Robitussin    Past Medical History:    Vitamin D deficiency disease  Secondary female infertility    Past Surgical History:    Female circumcision  Cholecystectomy, laporoscopic    Family History:    Family history reviewed. No pertinent family history.    Social History:  The patient was accompanied to the ED by her daughter.  Smoking Status: Never Smoker  Smokeless Tobacco: Never Used  Alcohol Use: Negative  Drug Use: Negative  PCP: Anastasia An  Marital Status:       Review of Systems   Constitutional: Negative for fever.   Respiratory: Positive for cough and shortness of breath.    Gastrointestinal: Positive for diarrhea.   All other systems reviewed and are negative.      Physical Exam     Patient Vitals for the past 24 hrs:   BP Temp Temp src Pulse Resp SpO2   11/15/20 1714 130/81 98.9  F (37.2  C) Temporal 77 20 98 %       Physical Exam  General:              Well-nourished              Speaking in full sentences  Eyes:              Conjunctiva without injection or scleral icterus  ENT:              Moist mucous membranes              Nares patent              Pinnae normal  Neck:              Full ROM              No stiffness appreciated  Resp:              Lungs CTAB              No crackles, wheezing or audible rubs              Good air movement  CV:                    Normal rate, regular  rhythm              S1 and S2 present              No murmur, gallop or rub  GI:              BS present              Abdomen soft without distention              Non-tender to light and deep palpation              No guarding or rebound tenderness  Skin:              Warm, dry, well perfused              No rashes or open wounds on exposed skin  MSK:              Moves all extremities              No focal deformities or swelling  Neuro:              Alert              Answers questions appropriately              Moves all extremities equally              Gait stable  Psych:              Normal affect, normal mood    Emergency Department Course     Laboratory:  Laboratory findings were communicated with the patient who voiced understanding of the findings.    Symptomatic COVID-19 Virus (Coronavirus) by PCR Nasopharyngeal swab: Pending    Emergency Department Course:    Past medical records, nursing notes, and vitals reviewed.  1818: I performed an exam of the patient and obtained history, as documented above.     Findings and plan explained to the Patient. Patient discharged home with instructions regarding supportive care, medications, and reasons to return. The importance of close follow-up was reviewed.    Impression & Plan      Medical Decision Making:  Manuel Phillip is a 40 year old female who presents to the emergency department today for evaluation of covid symptoms.  VS on presentation reveal elevated BP though are otherwise unremarkable.  Given above symptom complex and known exposure, high suspicion for symptomatic COVID in this patient.  At present, she is well appearing, with unremarkable work of breathing, absence of hypoxia, and afebrile.  Given clear pulmonary exam, unremarkable work of breathing, and absence of hypoxia/tachypnea, do feel CXR can be deferred safely, which patient is in agreement with.  Patient does not have notable underlying risk factors other than obesity.  Given her clinical well  appearance, do feel she is stable for discharge from the ED with close monitoring of symptoms.  Testing was obtained from the ED today and patient informed of how she will be notified of its results.  We discussed the importance of isolation, particularly while awaiting Covid test results, in accordance with guidelines from the [facility]/CDC.  Patient verbalized understanding of this.  Strict return precautions were discussed including any worsening of respiratory status, fevers, vomiting, inability to tolerate p.o., weakness, or any other concerns.  Patient felt comfortable with this plan of care and all questions were answered prior to discharge.    Covid-19  Manuel Phillip was evaluated during a global COVID-19 pandemic, which necessitated consideration that the patient might be at risk for infection with the SARS-CoV-2 virus that causes COVID-19.   Applicable protocols for evaluation were followed during the patient's care.   COVID-19 was considered as part of the patient's evaluation. The plan for testing is:  a test was obtained during this visit.    Diagnosis:    ICD-10-CM    1. Exposure to COVID-19 virus  Z20.828    2. Suspected COVID-19 virus infection  Z20.828        Disposition:   The patient is discharged to home.    Scribe Disclosure:  I, Nicho Sanchez, am serving as a scribe at 6:09 PM on 11/15/2020 to document services personally performed by Eddie Bose MD based on my observations and the provider's statements to me.  [facility] EMERGENCY DEPT       Eddie Bose MD  11/15/20 1938

## 2020-11-17 ENCOUNTER — PATIENT OUTREACH (OUTPATIENT)
Dept: CARE COORDINATION | Facility: CLINIC | Age: 40
End: 2020-11-17

## 2020-11-17 ENCOUNTER — TELEPHONE (OUTPATIENT)
Dept: EMERGENCY MEDICINE | Facility: CLINIC | Age: 40
End: 2020-11-17

## 2020-11-17 DIAGNOSIS — U07.1 2019 NOVEL CORONAVIRUS DISEASE (COVID-19): Primary | ICD-10-CM

## 2020-11-17 NOTE — PROGRESS NOTES
Clinic Care Coordination Contact  [facility]/Voicemail       Clinical Data: Care Coordinator Outreach  Outreach attempted x 1.  Left message on patient's voicemail with call back information and requested return call.  Plan:. Care Coordinator will try to reach patient again in 1-2 business days.      Daughter is also referred.

## 2020-11-17 NOTE — TELEPHONE ENCOUNTER
"Coronavirus (COVID-19) Notification    Caller Name (Patient, parent, daughter/son, grandparent, etc)  Patient     Reason for call  Notify of Positive Coronavirus (COVID-19) lab results, assess symptoms,  review [facility] recommendations    Lab Result    Lab test:  2019-nCoV rRt-PCR or SARS-CoV-2 PCR    Oropharyngeal AND/OR nasopharyngeal swabs is POSITIVE for 2019-nCoV RNA/SARS-COV-2 PCR (COVID-19 virus)    RN Recommendations/Instructions per [facility] Coronavirus COVID-19 recommendations    Brief introduction script  Introduce self then review script:  \"I am calling on behalf of [facility].  We were notified that your Coronavirus test (COVID-19) for was POSITIVE for the virus.  I have some information to relay to you but first I wanted to mention that the [place] Dept of Health will be contacting you shortly [it's possible MD already called Patient] to talk to you more about how you are feeling and other people you have had contact with who might now also have the virus.  Also, [facility] is Partnering with the [facility] for Covid-19 research, you may be contacted directly by research staff.\"    Assessment (Inquire about Patient's current symptoms)   Assessment   Current Symptoms at time of phone call: (if no symptoms, document No symptoms]  body aches, nausea    Symptoms onset (if applicable)  11/13/20     If at time of call, Patients symptoms hare worsened, the Patient should contact 911 or have someone drive them to Emergency Dept promptly:      If Patient calling 911, inform 911 personal that you have tested positive for the Coronavirus (COVID-19).  Place mask on and await 911 to arrive.    If Emergency Dept, If possible, please have another adult drive you to the Emergency Dept but you need to wear mask when in contact with other people.      Review information with Patient    Your result was positive. This means you have COVID-19 (coronavirus).  We have sent you a letter that " reviews the information that I'll be reviewing with you now.    How can I protect others?    If you have symptoms: stay home and away from others (self-isolate) until:    You've had no fever--and no medicine that reduces fever--for 1 full day (24 hours). And       Your other symptoms have gotten better. For example, your cough or breathing has improved. And     At least 10 days have passed since your symptoms started. (If you've been told by a doctor that you have a weak immune system, wait 20 days.)     If you don't have symptoms: Stay home and away from others (self-isolate) until at least 10 days have passed since your first positive COVID-19 test. (Date test collected)    During this time:    Stay in your own room, including for meals. Use your own bathroom if you can.    Stay away from others in your home. No hugging, kissing or shaking hands. No visitors.     Don't go to work, school or anywhere else.     Clean  high touch  surfaces often (doorknobs, counters, handles, etc.). Use a household cleaning spray or wipes. You'll find a full list on the EPA website at www.epa.gov/pesticide-registration/list-n-disinfectants-use-against-sars-cov-2.     Cover your mouth and nose with a mask, tissue or other face covering to avoid spreading germs.    Wash your hands and face often with soap and water.    Caregivers in these groups are at risk for severe illness due to COVID-19:  o People 65 years and older  o People who live in a nursing home or long-term care facility  o People with chronic disease (lung, heart, cancer, diabetes, kidney, liver, immunologic)  o People who have a weakened immune system, including those who:  - Are in cancer treatment  - Take medicine that weakens the immune system, such as corticosteroids  - Had a bone marrow or organ transplant  - Have an immune deficiency  - Have poorly controlled HIV or AIDS  - Are obese (body mass index of 40 or higher)  - Smoke regularly    Caregivers should wear  gloves while washing dishes, handling laundry and cleaning bedrooms and bathrooms.    Wash and dry laundry with special caution. Don't shake dirty laundry, and use the warmest water setting you can.    If you have a weakened immune system, ask your doctor about other actions you should take.    For more tips, go to www.cdc.gov/coronavirus/2019-ncov/downloads/10Things.pdf.    You should not go back to work until you meet the guidelines above for ending your home isolation. You don't need to be retested for COVID-19 before going back to work--studies show that you won't spread the virus if it's been at least 10 days since your symptoms started (or 20 days, if you have a weak immune system).    Employers: This document serves as formal notice of your employee's medical guidelines for going back to work. They must meet the above guidelines before going back to work in person.    How can I take care of myself?    1. Get lots of rest. Drink extra fluids (unless a doctor has told you not to).    2. Take Tylenol (acetaminophen) for fever or pain. If you have liver or kidney problems, ask your family doctor if it's okay to take Tylenol.     Take either:     650 mg (two 325 mg pills) every 4 to 6 hours, or     1,000 mg (two 500 mg pills) every 8 hours as needed.     Note: Don't take more than 3,000 mg in one day. Acetaminophen is found in many medicines (both prescribed and over-the-counter medicines). Read all labels to be sure you don't take too much.    For children, check the Tylenol bottle for the right dose (based on their age or weight).    3. If you have other health problems (like cancer, heart failure, an organ transplant or severe kidney disease): Call your specialty clinic if you don't feel better in the next 2 days.    4. Know when to call 911: Emergency warning signs include:    Trouble breathing or shortness of breath    Pain or pressure in the chest that doesn't go away    Feeling confused like you haven't  felt before, or not being able to wake up    Bluish-colored lips or face    5. Sign up for [employer]. We know it's scary to hear that you have COVID-19. We want to track your symptoms to make sure you're okay over the next 2 weeks. Please look for an email from [employer]--this is a free, online program that we'll use to keep in touch. To sign up, follow the link in the email. Learn more at [email or web address].    Where can I get more information?    [facility]: [email or web address]    Coronavirus Basics: www.health.[facility].[place]./diseases/coronavirus/basics.html    What to Do If You're Sick: www.cdc.gov/coronavirus/2019-ncov/about/steps-when-sick.html    Ending Home Isolation: www.cdc.gov/coronavirus/2019-ncov/hcp/disposition-in-home-patients.html     Caring for Someone with COVID-19: www.cdc.gov/coronavirus/2019-ncov/if-you-are-sick/care-for-someone.html     [facility] clinical trials (COVID-19 research studies): [email or web address]-[email or web address]     A Positive COVID-19 letter will be sent via [employer] or the mail. (Exception, no letters sent to Presurgerical/Preprocedure Patients)    [Name]  Ahmet Chaudhari RN  [employer] - [facility]  COVID19 Results Team RN  Ph# [phone]

## 2020-11-18 NOTE — PROGRESS NOTES
Clinic Care Coordination Contact  Community Health Worker Initial Outreach            Patient accepts CC: No, we have everything we need. She was quick to get off the phone.  She did say, she is feeling better from last week.

## 2021-01-04 ENCOUNTER — OFFICE VISIT (OUTPATIENT)
Dept: OBGYN | Facility: CLINIC | Age: 41
End: 2021-01-04
Payer: COMMERCIAL

## 2021-01-04 VITALS — DIASTOLIC BLOOD PRESSURE: 74 MMHG | WEIGHT: 266 LBS | BODY MASS INDEX: 37.1 KG/M2 | SYSTOLIC BLOOD PRESSURE: 128 MMHG

## 2021-01-04 DIAGNOSIS — N97.9 INABILITY TO CONCEIVE, FEMALE: Primary | ICD-10-CM

## 2021-01-04 PROCEDURE — 99202 OFFICE O/P NEW SF 15 MIN: CPT | Performed by: ADVANCED PRACTICE MIDWIFE

## 2021-01-04 NOTE — NURSING NOTE
"Chief Complaint   Patient presents with     Consult     trying to conceive x15 yrs, last pregnancy , wants \"ovaries, tubes and eggs checked\"       Initial /74 (BP Location: Left arm, Cuff Size: Adult Large)   Wt 120.7 kg (266 lb)   LMP  (LMP Unknown)   BMI 37.10 kg/m   Estimated body mass index is 37.1 kg/m  as calculated from the following:    Height as of 19: 1.803 m (5' 11\").    Weight as of this encounter: 120.7 kg (266 lb).  BP completed using cuff size: large    Questioned patient about current smoking habits.  Pt. has never smoked.        Federica Balderas, HARSHAL      "

## 2021-01-04 NOTE — PATIENT INSTRUCTIONS
Preconception Care    If you are thinking about becoming pregnant in the near future or actively trying to conceive we want to make sure you are as healthy as possible before becoming pregnant. By meeting with your midwife or provider prior to getting pregnant it allows us to address any health needs that can affect pregnancy. Please discuss your personal and family history with your midwife in order for us to identify any risk factors      If you wish to attempt pregnancy stop whichever form of contraception you use. If you have an IUD it can be removed in the office and you can start trying right away. If you take OCPs finish current pack, cycle, and then you can actively start trying      Make sure all the medications you are taking are safe for pregnancy. This is especially important for women with chronic health conditions such as diabetes, seizure disorders, and/or hypertension. If you are unsure if your medications are safe we can discuss them with you, do not abruptly stop taking something if you've been prescribed a medication by a medical provider      Folic acid 400-800 mcg per day by mouth daily, begin this routine 1 to 12 months prior to planned conception, and continue through at least 13 weeks gestation. Some prenatal/multi-vitamins contain enough folic acid       Be up to date on all your vaccines. Avoid pregnancy for 1 month after administration of varicella/ Rubella (MMR) vaccines      We encourage all women to be at healthy BMI (<26) when attempting pregnancy, it is healthier for both you and your baby. If you are overweight you can make a healthy choice by eating better and exercising more. Waiting to get pregnant at a healthy weight is an excellent choice.                                       Eat a healthy, well-balanced diet containing lots of fresh fruit and vegetables (frozen without added sugar is okay), protein, and healthy carbohydrates such as whole wheat breads and pastas      Exercise  regularly. If you are wishing to get pregnant maintain normal exercise routine. If you don't exercise this is a great time for light activity daily such as walking/swimming      Limit caffeine intake to less than 200 mg per day, typically 1-2 cups of regular coffee is about 200 mg.       Avoid cigarettes, alcohol, and recreational drug use while attempting pregnancy. If you are actively trying to conceive but you get your period or a negative pregnancy test it is okay to have alcohol in moderation until you are actively trying again      If you have the potential to toxic chemical exposures in your work place or home please use special precautions    Menstrual Cycles      Knowing your cycle is incredibly important when attempting pregnancy      Your cycle begins day 1 of your period and goes until the 1st day of your next period. Typically women have 28-32 day cycles. If your cycles are shorter or longer it can be a normal variation.       Women typically ovulate in the middle of their cycle      There are many great apps that can help you track you cycle monthly to establish when you are ovulating      You may also start taking your temperature daily with a basal body temp thermometer to help identify when you ovulate      There are also ovulation predictor kits available over the counter at the pharmacy      If you want more information please contact your midwife      It can take up to 1 year for a woman under the age of 35 or 6 months for a woman over the age of 35 to conceive. If it takes longer than this we would like to evaluate you for fertility issues.       Please have your  call his doctor to get scheduled for a semen analysis.   This can be done with his regular doctor, or he may need a referral to urology.

## 2022-02-09 ENCOUNTER — PRENATAL OFFICE VISIT (OUTPATIENT)
Dept: NURSING | Facility: CLINIC | Age: 42
End: 2022-02-09
Payer: COMMERCIAL

## 2022-02-09 VITALS — HEIGHT: 71 IN | BODY MASS INDEX: 37.1 KG/M2

## 2022-02-09 DIAGNOSIS — Z34.80 PRENATAL CARE, SUBSEQUENT PREGNANCY, UNSPECIFIED TRIMESTER: Primary | ICD-10-CM

## 2022-02-09 PROCEDURE — 99207 PR NO CHARGE NURSE ONLY: CPT

## 2022-02-09 RX ORDER — ASPIRIN 81 MG/1
81 TABLET, CHEWABLE ORAL DAILY
Status: ON HOLD | COMMUNITY
End: 2022-09-14

## 2022-02-09 RX ORDER — VITAMIN A ACETATE, .BETA.-CAROTENE, ASCORBIC ACID, CHOLECALCIFEROL, .ALPHA.-TOCOPHEROL ACETATE, DL-, THIAMINE MONONITRATE, RIBOFLAVIN, NIACINAMIDE, PYRIDOXINE HYDROCHLORIDE, FOLIC ACID, CYANOCOBALAMIN, CALCIUM CARBONATE, FERROUS FUMARATE, ZINC OXIDE, AND CUPRIC OXIDE 2000; 2000; 120; 400; 22; 1.84; 3; 20; 10; 1; 12; 200; 27; 25; 2 [IU]/1; [IU]/1; MG/1; [IU]/1; MG/1; MG/1; MG/1; MG/1; MG/1; MG/1; UG/1; MG/1; MG/1; MG/1; MG/1
1 TABLET ORAL DAILY
Qty: 100 TABLET | Refills: 3 | Status: SHIPPED | OUTPATIENT
Start: 2022-02-09 | End: 2022-02-09

## 2022-02-09 RX ORDER — PYRIDOXINE HCL (VITAMIN B6) 25 MG
25 TABLET ORAL 3 TIMES DAILY
Qty: 100 TABLET | Refills: 1 | Status: SHIPPED | OUTPATIENT
Start: 2022-02-09 | End: 2024-07-16

## 2022-02-09 RX ORDER — VITAMIN A ACETATE, .BETA.-CAROTENE, ASCORBIC ACID, CHOLECALCIFEROL, .ALPHA.-TOCOPHEROL ACETATE, DL-, THIAMINE MONONITRATE, RIBOFLAVIN, NIACINAMIDE, PYRIDOXINE HYDROCHLORIDE, FOLIC ACID, CYANOCOBALAMIN, CALCIUM CARBONATE, FERROUS FUMARATE, ZINC OXIDE, AND CUPRIC OXIDE 2000; 2000; 120; 400; 22; 1.84; 3; 20; 10; 1; 12; 200; 27; 25; 2 [IU]/1; [IU]/1; MG/1; [IU]/1; MG/1; MG/1; MG/1; MG/1; MG/1; MG/1; UG/1; MG/1; MG/1; MG/1; MG/1
1 TABLET ORAL DAILY
Qty: 100 TABLET | Refills: 3 | Status: SHIPPED | OUTPATIENT
Start: 2022-02-09 | End: 2024-07-16

## 2022-02-09 NOTE — PROGRESS NOTES
NPN nurse visit done over the phone. Pt will be given NPN folder and book at her upcoming appt.   Discussed optional screening available to assess chromosomal anomalies. Questions answered. Pt advised to call the clinic if she has any questions or concerns related to her pregnancy. Prenatal labs will be obtained at her upcoming appt. New prenatal visit scheduled on 3/4/22 with Dr Rodrigues.    8w3d    Pt had IVF in [place].  Advised to bring these records if she has them.   She has not been sick with COVID since being pregnant.      Lab Results   Component Value Date    PAP NIL 04/10/2014           Patient supplied answers from flow sheet for:  Prenatal OB Questionnaire.  Past Medical History  Have you ever recieved care for your mental health? : No  Have you ever been in a major accident or suffered serious trauma?: No  Within the last year, has anyone hit, slapped, kicked or otherwise hurt you?: No  In the last year, has anyone forced you to have sex when you didn't want to?: No    Past Medical History 2   Have you ever received a blood transfusion?: No  Would you accept a blood transfusion if was medically recommended?: Yes  Does anyone in your home smoke?: No   Is your blood type Rh negative?: No  Have you ever ?: (!) Yes  Have you been hospitalized for a nonsurgical reason excluding normal delivery?: No  Have you ever had an abnormal pap smear?: No    Past Medical History (Continued)  Do you have a history of abnormalities of the uterus?: No  Did your mother take MICHAEL or any other hormones when she was pregnant with you?: No  Do you have any other problems we have not asked about which you feel may be important to this pregnancy?: No

## 2022-02-13 ENCOUNTER — NURSE TRIAGE (OUTPATIENT)
Dept: NURSING | Facility: CLINIC | Age: 42
End: 2022-02-13
Payer: COMMERCIAL

## 2022-02-13 NOTE — TELEPHONE ENCOUNTER
Caller  states miguel took her husbands diabetic medication  Glipizide ER  5 mg by mistake 30 minutes  is I 8 weeks  Pregnant ;is not diabetic     Call transferred to Poison Contol   Meghan Lofton RN  FNA                Reason for Disposition    Took another person's prescription drug    Additional Information    Negative: Severe difficulty breathing (e.g., struggling for each breath, speaks in single words)    Negative: Bluish (or gray) lips or face now    Negative: Seizure    Negative: Difficult to awaken or acting confused (e.g., disoriented, slurred speech)    Negative: Shock suspected (e.g., cold/pale/clammy skin, too weak to stand, low BP, rapid pulse)    Negative: [1] Intentional overdose AND [2] suicidal thoughts or ideas    Negative: Suicide attempt, known or suspected    Negative: Sounds like a life-threatening emergency to the triager    Negative: Inhalation of smoke or fumes    Negative: Carbon monoxide exposure, known or suspected    Negative: Chemical in the eye    Negative: Chemical on the skin    Negative: Swallowed a (non-poisonous) foreign body    Negative: [1] HARMFUL SUBSTANCE or ACID or ALKALI ingestion (e.g., toilet , drain , lye, Clinitest tablets, ammonia, bleaches) AND [2] any symptoms (e.g., mouth pain, sore throat, breathing difficulty)    Negative: [1] PETROLEUM PRODUCT ingestion (e.g., kerosene, gasoline, benzene, furniture polish, lighter fluid) AND [2] any symptoms (e.g., breathing difficulty, coughing, vomiting)    Negative: [1] Poison Center advised caller to go to ED AND [2] caller seeking second opinion    Negative: Patient sounds very sick or weak to the triager    Protocols used: POISONING-A-

## 2022-02-25 ENCOUNTER — ANCILLARY PROCEDURE (OUTPATIENT)
Dept: ULTRASOUND IMAGING | Facility: CLINIC | Age: 42
End: 2022-02-25
Payer: COMMERCIAL

## 2022-02-25 ENCOUNTER — LAB (OUTPATIENT)
Dept: LAB | Facility: CLINIC | Age: 42
End: 2022-02-25
Payer: COMMERCIAL

## 2022-02-25 DIAGNOSIS — Z34.80 PRENATAL CARE, SUBSEQUENT PREGNANCY, UNSPECIFIED TRIMESTER: ICD-10-CM

## 2022-02-25 LAB
ABO/RH(D): NORMAL
ANTIBODY SCREEN: NEGATIVE
ERYTHROCYTE [DISTWIDTH] IN BLOOD BY AUTOMATED COUNT: 14 % (ref 10–15)
HCT VFR BLD AUTO: 36.5 % (ref 35–47)
HGB BLD-MCNC: 12.5 G/DL (ref 11.7–15.7)
MCH RBC QN AUTO: 30.9 PG (ref 26.5–33)
MCHC RBC AUTO-ENTMCNC: 34.2 G/DL (ref 31.5–36.5)
MCV RBC AUTO: 90 FL (ref 78–100)
PLATELET # BLD AUTO: 298 10E3/UL (ref 150–450)
RBC # BLD AUTO: 4.04 10E6/UL (ref 3.8–5.2)
SPECIMEN EXPIRATION DATE: NORMAL
WBC # BLD AUTO: 6.8 10E3/UL (ref 4–11)

## 2022-02-25 PROCEDURE — 87086 URINE CULTURE/COLONY COUNT: CPT

## 2022-02-25 PROCEDURE — 86803 HEPATITIS C AB TEST: CPT

## 2022-02-25 PROCEDURE — 87340 HEPATITIS B SURFACE AG IA: CPT

## 2022-02-25 PROCEDURE — 86780 TREPONEMA PALLIDUM: CPT

## 2022-02-25 PROCEDURE — 86762 RUBELLA ANTIBODY: CPT

## 2022-02-25 PROCEDURE — 86901 BLOOD TYPING SEROLOGIC RH(D): CPT

## 2022-02-25 PROCEDURE — 36415 COLL VENOUS BLD VENIPUNCTURE: CPT

## 2022-02-25 PROCEDURE — 76801 OB US < 14 WKS SINGLE FETUS: CPT | Performed by: OBSTETRICS & GYNECOLOGY

## 2022-02-25 PROCEDURE — 86900 BLOOD TYPING SEROLOGIC ABO: CPT

## 2022-02-25 PROCEDURE — 86850 RBC ANTIBODY SCREEN: CPT

## 2022-02-25 PROCEDURE — 85027 COMPLETE CBC AUTOMATED: CPT

## 2022-02-25 PROCEDURE — 87389 HIV-1 AG W/HIV-1&-2 AB AG IA: CPT

## 2022-02-26 LAB
BACTERIA UR CULT: NORMAL
HBV SURFACE AG SERPL QL IA: NONREACTIVE
HCV AB SERPL QL IA: REACTIVE
HIV 1+2 AB+HIV1 P24 AG SERPL QL IA: NONREACTIVE
T PALLIDUM AB SER QL: NONREACTIVE

## 2022-02-28 LAB
RUBV IGG SERPL QL IA: 2.93 INDEX
RUBV IGG SERPL QL IA: POSITIVE

## 2022-03-08 ENCOUNTER — PRENATAL OFFICE VISIT (OUTPATIENT)
Dept: OBGYN | Facility: CLINIC | Age: 42
End: 2022-03-08
Payer: COMMERCIAL

## 2022-03-08 VITALS
SYSTOLIC BLOOD PRESSURE: 116 MMHG | BODY MASS INDEX: 36.12 KG/M2 | DIASTOLIC BLOOD PRESSURE: 72 MMHG | HEIGHT: 71 IN | WEIGHT: 258 LBS

## 2022-03-08 DIAGNOSIS — Z11.3 SCREEN FOR STD (SEXUALLY TRANSMITTED DISEASE): Primary | ICD-10-CM

## 2022-03-08 DIAGNOSIS — O09.529 HIGH-RISK PREGNANCY, ELDERLY MULTIGRAVIDA, UNSPECIFIED TRIMESTER: ICD-10-CM

## 2022-03-08 PROCEDURE — 87491 CHLMYD TRACH DNA AMP PROBE: CPT | Performed by: OBSTETRICS & GYNECOLOGY

## 2022-03-08 PROCEDURE — 99207 PR FIRST OB VISIT: CPT | Performed by: OBSTETRICS & GYNECOLOGY

## 2022-03-08 PROCEDURE — 87591 N.GONORRHOEAE DNA AMP PROB: CPT | Performed by: OBSTETRICS & GYNECOLOGY

## 2022-03-08 NOTE — PROGRESS NOTES
Manuel is a 42 year old  at 12w2d here for new ob visit.      Here with FOB Luiz.  They have a 16 and 17 yo son and daughter.  Secondary infertility, treated with IVF in [place].  They transferred two embryos, are now pregnant with a munguia pregnancy.  Her own eggs.   Was nauseated early in pregnancy, now feeling much better which makes her nervous.  Not vaccinated for covid.  Not vaccinated for flu.   Declines pap smear today    OB History    Para Term  AB Living   3 2 2 0 0 2   SAB IAB Ectopic Multiple Live Births   0 0 0 0 2      # Outcome Date GA Lbr Zack/2nd Weight Sex Delivery Anes PTL Lv   3 Current            2 Term 05 40w0d  3.317 kg (7 lb 5 oz) F    ANNETTA   1 Term 03 41w0d 24:00 4.082 kg (9 lb) M    ANNETTA      Birth Comments: no comp       ROS: Ten point review of systems was reviewed and negative except the above.    Past Medical History:   Diagnosis Date     No pertinent past medical history 2022     Past Surgical History:   Procedure Laterality Date     CHOLECYSTECTOMY, LAPOROSCOPIC  3/09     [facility] GENITAL SURG PROC, FEMALE UNLISTED      female circumcision     Patient Active Problem List    Diagnosis Date Noted     Vitamin D deficiency disease 2013     Priority: Medium     CARDIOVASCULAR SCREENING; LDL GOAL LESS THAN 160 10/31/2010     Priority: Medium     Infertility, female, secondary 2010     Priority: Medium        Allergies   Allergen Reactions     No Known Drug Allergies      aspirin (ASA) 81 MG chewable tablet, Take 81 mg by mouth daily  doxylamine (UNISOM) 25 MG TABS tablet, Take 1 tablet (25 mg) by mouth At Bedtime  Prenatal Vit-Fe Fumarate-FA (PNV PRENATAL PLUS MULTIVITAMIN) 27-1 MG TABS per tablet, Take 1 tablet by mouth daily  UNKNOWN TO PATIENT, Progesterone vaginal suppository and bid injections per fertility clinic  pyridOXINE (VITAMIN B6) 25 MG tablet, Take 1 tablet (25 mg) by mouth 3 times daily    No current facility-administered  "medications on file prior to visit.      Physical Exam:   /72 (BP Location: Left arm, Patient Position: Chair, Cuff Size: Adult Large)   Ht 1.803 m (5' 11\")   Wt 117 kg (258 lb)   LMP 12/15/2021   Breastfeeding No   BMI 35.98 kg/m      Gen:  no acute distress, comfortable, smiling  HENT: No scleral injection or icterus  CV: Regular rate and rhythm, no m/g/r  Resp: Normal work of breathing, no cough  GI: Abdomen soft, non-tender. No masses, organomegaly  Skin: No suspicious lesions or rashes  Psychiatric: mentation appears normal and affect bright    Doptones unable to auscultate  FH cwd  BSUS nl 151 bpm    No results found for: ABO, RH, [id number]    A/P 42 year old  at 12w2d here for NOB visit.  - Discussed physician coverage, tertiary support, diet, exercise, weight gain, schedule of visits, routine and indicated ultrasounds, and childbirth education.  Discussed MFM coverage and reviewed options for  testing in the first trimester.  Recommended PNV.  NOB labs and US reviewed.       Concerns:  - B+/RI.  FOB Luiz.   - AMA, declines NIPT.  Planning level 2.  Weekly BPPs after 36 wks and delivery at 39 wks.   - IVF pregnancy, plan level 2 and fetal echo.  On baby aspirin.   - due for pap, will offer again at upcoming visit (declines today)  - BMI 36  - highly encouraged covid vaccination, she declines today.     RTC 4 weeks    Constanza House MD, MPH  [facility]     "

## 2022-03-08 NOTE — NURSING NOTE
"Chief Complaint   Patient presents with     Prenatal Care     12 2/7 weeks       Initial /72 (BP Location: Left arm, Patient Position: Chair, Cuff Size: Adult Large)   Ht 1.803 m (5' 11\")   Wt 117 kg (258 lb)   LMP 12/15/2021   Breastfeeding No   BMI 35.98 kg/m   Estimated body mass index is 35.98 kg/m  as calculated from the following:    Height as of this encounter: 1.803 m (5' 11\").    Weight as of this encounter: 117 kg (258 lb).  BP completed using cuff size: large    Questioned patient about current smoking habits.  Pt. has never smoked.          The following HM Due: pap smear, GC/C  Rupali Armstrong CMA           "

## 2022-03-09 ENCOUNTER — TRANSCRIBE ORDERS (OUTPATIENT)
Dept: MATERNAL FETAL MEDICINE | Facility: CLINIC | Age: 42
End: 2022-03-09
Payer: COMMERCIAL

## 2022-03-09 DIAGNOSIS — O26.90 PREGNANCY RELATED CONDITION: Primary | ICD-10-CM

## 2022-03-09 LAB
C TRACH DNA SPEC QL NAA+PROBE: NEGATIVE
N GONORRHOEA DNA SPEC QL NAA+PROBE: NEGATIVE

## 2022-04-01 ENCOUNTER — TELEPHONE (OUTPATIENT)
Dept: OBGYN | Facility: CLINIC | Age: 42
End: 2022-04-01
Payer: COMMERCIAL

## 2022-04-01 DIAGNOSIS — N97.9 FEMALE INFERTILITY: Primary | ICD-10-CM

## 2022-04-01 RX ORDER — SYRINGE-NEEDLE,INSULIN,0.5 ML 27GX1/2"
SYRINGE, EMPTY DISPOSABLE MISCELLANEOUS
Qty: 24 EACH | Refills: 1 | Status: SHIPPED | OUTPATIENT
Start: 2022-04-01 | End: 2022-06-09

## 2022-04-01 NOTE — TELEPHONE ENCOUNTER
Please notify the patient that I sent the prescription for the syringes to the [facility] pharmacy

## 2022-04-01 NOTE — TELEPHONE ENCOUNTER
Pt calling.   15w5d    She is needing an rx for SubQ needles that she uses for her progesterone injections.   She did IVF with this pregnancy.    Pt states that she needs this by tomorrow.    Juliette OLIVA RN

## 2022-04-15 ENCOUNTER — PRE VISIT (OUTPATIENT)
Dept: MATERNAL FETAL MEDICINE | Facility: CLINIC | Age: 42
End: 2022-04-15
Payer: COMMERCIAL

## 2022-05-05 ENCOUNTER — PRE VISIT (OUTPATIENT)
Dept: MATERNAL FETAL MEDICINE | Facility: CLINIC | Age: 42
End: 2022-05-05
Payer: COMMERCIAL

## 2022-05-11 ENCOUNTER — NURSE TRIAGE (OUTPATIENT)
Dept: NURSING | Facility: CLINIC | Age: 42
End: 2022-05-11
Payer: COMMERCIAL

## 2022-05-11 PROCEDURE — 99285 EMERGENCY DEPT VISIT HI MDM: CPT | Mod: CS,25

## 2022-05-12 ENCOUNTER — HOSPITAL ENCOUNTER (EMERGENCY)
Facility: CLINIC | Age: 42
Discharge: LEFT BEFORE DISCHARGE | End: 2022-05-12
Attending: EMERGENCY MEDICINE | Admitting: EMERGENCY MEDICINE
Payer: COMMERCIAL

## 2022-05-12 ENCOUNTER — APPOINTMENT (OUTPATIENT)
Dept: ULTRASOUND IMAGING | Facility: CLINIC | Age: 42
End: 2022-05-12
Attending: EMERGENCY MEDICINE
Payer: COMMERCIAL

## 2022-05-12 ENCOUNTER — TELEPHONE (OUTPATIENT)
Dept: OBGYN | Facility: CLINIC | Age: 42
End: 2022-05-12

## 2022-05-12 ENCOUNTER — APPOINTMENT (OUTPATIENT)
Dept: GENERAL RADIOLOGY | Facility: CLINIC | Age: 42
End: 2022-05-12
Attending: EMERGENCY MEDICINE
Payer: COMMERCIAL

## 2022-05-12 ENCOUNTER — HOSPITAL ENCOUNTER (EMERGENCY)
Facility: CLINIC | Age: 42
Discharge: HOME | End: 2022-05-12
Attending: EMERGENCY MEDICINE | Admitting: EMERGENCY MEDICINE
Payer: COMMERCIAL

## 2022-05-12 VITALS
HEIGHT: 71 IN | OXYGEN SATURATION: 99 % | DIASTOLIC BLOOD PRESSURE: 62 MMHG | TEMPERATURE: 98.1 F | WEIGHT: 250 LBS | HEART RATE: 88 BPM | BODY MASS INDEX: 35 KG/M2 | SYSTOLIC BLOOD PRESSURE: 118 MMHG | RESPIRATION RATE: 18 BRPM

## 2022-05-12 VITALS
TEMPERATURE: 98 F | RESPIRATION RATE: 20 BRPM | HEART RATE: 105 BPM | DIASTOLIC BLOOD PRESSURE: 72 MMHG | OXYGEN SATURATION: 99 % | WEIGHT: 250 LBS | BODY MASS INDEX: 34.87 KG/M2 | SYSTOLIC BLOOD PRESSURE: 121 MMHG

## 2022-05-12 DIAGNOSIS — U07.1 COVID-19 VIRUS INFECTION: ICD-10-CM

## 2022-05-12 DIAGNOSIS — R20.0 EXTREMITY NUMBNESS: ICD-10-CM

## 2022-05-12 DIAGNOSIS — R06.00 DYSPNEA, UNSPECIFIED TYPE: ICD-10-CM

## 2022-05-12 LAB
ANION GAP SERPL CALCULATED.3IONS-SCNC: 6 MMOL/L (ref 3–14)
ANION GAP SERPL CALCULATED.3IONS-SCNC: 7 MMOL/L (ref 3–14)
ATRIAL RATE - MUSE: 99 BPM
B-HCG SERPL-ACNC: 6490 IU/L (ref 0–5)
BASOPHILS # BLD AUTO: 0 10E3/UL (ref 0–0.2)
BASOPHILS # BLD AUTO: 0 10E3/UL (ref 0–0.2)
BASOPHILS NFR BLD AUTO: 0 %
BASOPHILS NFR BLD AUTO: 0 %
BUN SERPL-MCNC: 7 MG/DL (ref 7–30)
BUN SERPL-MCNC: 8 MG/DL (ref 7–30)
CALCIUM SERPL-MCNC: 9 MG/DL (ref 8.5–10.1)
CALCIUM SERPL-MCNC: 9.1 MG/DL (ref 8.5–10.1)
CHLORIDE BLD-SCNC: 105 MMOL/L (ref 94–109)
CHLORIDE BLD-SCNC: 108 MMOL/L (ref 94–109)
CO2 SERPL-SCNC: 21 MMOL/L (ref 20–32)
CO2 SERPL-SCNC: 22 MMOL/L (ref 20–32)
CREAT SERPL-MCNC: 0.41 MG/DL (ref 0.52–1.04)
CREAT SERPL-MCNC: 0.46 MG/DL (ref 0.52–1.04)
D DIMER PPP FEU-MCNC: 0.69 UG/ML FEU (ref 0–0.5)
DIASTOLIC BLOOD PRESSURE - MUSE: NORMAL MMHG
EOSINOPHIL # BLD AUTO: 0 10E3/UL (ref 0–0.7)
EOSINOPHIL # BLD AUTO: 0.2 10E3/UL (ref 0–0.7)
EOSINOPHIL NFR BLD AUTO: 0 %
EOSINOPHIL NFR BLD AUTO: 2 %
ERYTHROCYTE [DISTWIDTH] IN BLOOD BY AUTOMATED COUNT: 12.7 % (ref 10–15)
ERYTHROCYTE [DISTWIDTH] IN BLOOD BY AUTOMATED COUNT: 12.7 % (ref 10–15)
FLUAV RNA SPEC QL NAA+PROBE: NEGATIVE
FLUBV RNA RESP QL NAA+PROBE: NEGATIVE
GFR SERPL CREATININE-BSD FRML MDRD: >90 ML/MIN/1.73M2
GFR SERPL CREATININE-BSD FRML MDRD: >90 ML/MIN/1.73M2
GLUCOSE BLD-MCNC: 121 MG/DL (ref 70–99)
GLUCOSE BLD-MCNC: 153 MG/DL (ref 70–99)
GLUCOSE BLDC GLUCOMTR-MCNC: 168 MG/DL (ref 70–99)
HCT VFR BLD AUTO: 36.4 % (ref 35–47)
HCT VFR BLD AUTO: 37 % (ref 35–47)
HGB BLD-MCNC: 12 G/DL (ref 11.7–15.7)
HGB BLD-MCNC: 12.1 G/DL (ref 11.7–15.7)
HOLD SPECIMEN: NORMAL
HOLD SPECIMEN: NORMAL
IMM GRANULOCYTES # BLD: 0 10E3/UL
IMM GRANULOCYTES # BLD: 0.1 10E3/UL
IMM GRANULOCYTES NFR BLD: 0 %
IMM GRANULOCYTES NFR BLD: 1 %
INTERPRETATION ECG - MUSE: NORMAL
LYMPHOCYTES # BLD AUTO: 1 10E3/UL (ref 0.8–5.3)
LYMPHOCYTES # BLD AUTO: 1.2 10E3/UL (ref 0.8–5.3)
LYMPHOCYTES NFR BLD AUTO: 14 %
LYMPHOCYTES NFR BLD AUTO: 9 %
MCH RBC QN AUTO: 30.2 PG (ref 26.5–33)
MCH RBC QN AUTO: 30.2 PG (ref 26.5–33)
MCHC RBC AUTO-ENTMCNC: 32.7 G/DL (ref 31.5–36.5)
MCHC RBC AUTO-ENTMCNC: 33 G/DL (ref 31.5–36.5)
MCV RBC AUTO: 92 FL (ref 78–100)
MCV RBC AUTO: 92 FL (ref 78–100)
MONOCYTES # BLD AUTO: 0.4 10E3/UL (ref 0–1.3)
MONOCYTES # BLD AUTO: 0.4 10E3/UL (ref 0–1.3)
MONOCYTES NFR BLD AUTO: 4 %
MONOCYTES NFR BLD AUTO: 5 %
NEUTROPHILS # BLD AUTO: 6.6 10E3/UL (ref 1.6–8.3)
NEUTROPHILS # BLD AUTO: 8.7 10E3/UL (ref 1.6–8.3)
NEUTROPHILS NFR BLD AUTO: 79 %
NEUTROPHILS NFR BLD AUTO: 86 %
NRBC # BLD AUTO: 0 10E3/UL
NRBC # BLD AUTO: 0 10E3/UL
NRBC BLD AUTO-RTO: 0 /100
NRBC BLD AUTO-RTO: 0 /100
P AXIS - MUSE: 47 DEGREES
PLATELET # BLD AUTO: 279 10E3/UL (ref 150–450)
PLATELET # BLD AUTO: 288 10E3/UL (ref 150–450)
POTASSIUM BLD-SCNC: 3.8 MMOL/L (ref 3.4–5.3)
POTASSIUM BLD-SCNC: 4.3 MMOL/L (ref 3.4–5.3)
PR INTERVAL - MUSE: 128 MS
QRS DURATION - MUSE: 68 MS
QT - MUSE: 312 MS
QTC - MUSE: 400 MS
R AXIS - MUSE: 46 DEGREES
RBC # BLD AUTO: 3.97 10E6/UL (ref 3.8–5.2)
RBC # BLD AUTO: 4.01 10E6/UL (ref 3.8–5.2)
RSV RNA SPEC NAA+PROBE: NEGATIVE
SARS-COV-2 RNA RESP QL NAA+PROBE: POSITIVE
SODIUM SERPL-SCNC: 133 MMOL/L (ref 133–144)
SODIUM SERPL-SCNC: 136 MMOL/L (ref 133–144)
SYSTOLIC BLOOD PRESSURE - MUSE: NORMAL MMHG
T AXIS - MUSE: 8 DEGREES
TROPONIN I SERPL HS-MCNC: 4 NG/L
VENTRICULAR RATE- MUSE: 99 BPM
WBC # BLD AUTO: 10.2 10E3/UL (ref 4–11)
WBC # BLD AUTO: 8.5 10E3/UL (ref 4–11)

## 2022-05-12 PROCEDURE — 87637 SARSCOV2&INF A&B&RSV AMP PRB: CPT | Performed by: EMERGENCY MEDICINE

## 2022-05-12 PROCEDURE — 93005 ELECTROCARDIOGRAM TRACING: CPT

## 2022-05-12 PROCEDURE — 258N000003 HC RX IP 258 OP 636: Performed by: EMERGENCY MEDICINE

## 2022-05-12 PROCEDURE — 250N000013 HC RX MED GY IP 250 OP 250 PS 637: Performed by: EMERGENCY MEDICINE

## 2022-05-12 PROCEDURE — 99285 EMERGENCY DEPT VISIT HI MDM: CPT | Mod: 25

## 2022-05-12 PROCEDURE — 85025 COMPLETE CBC W/AUTO DIFF WBC: CPT | Performed by: EMERGENCY MEDICINE

## 2022-05-12 PROCEDURE — 84702 CHORIONIC GONADOTROPIN TEST: CPT | Performed by: EMERGENCY MEDICINE

## 2022-05-12 PROCEDURE — 36415 COLL VENOUS BLD VENIPUNCTURE: CPT | Performed by: EMERGENCY MEDICINE

## 2022-05-12 PROCEDURE — 82310 ASSAY OF CALCIUM: CPT | Performed by: EMERGENCY MEDICINE

## 2022-05-12 PROCEDURE — 85379 FIBRIN DEGRADATION QUANT: CPT | Performed by: EMERGENCY MEDICINE

## 2022-05-12 PROCEDURE — 96374 THER/PROPH/DIAG INJ IV PUSH: CPT

## 2022-05-12 PROCEDURE — 96361 HYDRATE IV INFUSION ADD-ON: CPT

## 2022-05-12 PROCEDURE — 93971 EXTREMITY STUDY: CPT | Mod: RT

## 2022-05-12 PROCEDURE — 71046 X-RAY EXAM CHEST 2 VIEWS: CPT

## 2022-05-12 PROCEDURE — 80048 BASIC METABOLIC PNL TOTAL CA: CPT | Performed by: EMERGENCY MEDICINE

## 2022-05-12 PROCEDURE — 84484 ASSAY OF TROPONIN QUANT: CPT | Performed by: EMERGENCY MEDICINE

## 2022-05-12 PROCEDURE — 250N000011 HC RX IP 250 OP 636: Performed by: EMERGENCY MEDICINE

## 2022-05-12 PROCEDURE — 250N000012 HC RX MED GY IP 250 OP 636 PS 637: Performed by: EMERGENCY MEDICINE

## 2022-05-12 RX ORDER — ALBUTEROL SULFATE 90 UG/1
2 AEROSOL, METERED RESPIRATORY (INHALATION) EVERY 6 HOURS PRN
Qty: 18 G | Refills: 0 | Status: SHIPPED | OUTPATIENT
Start: 2022-05-12 | End: 2023-02-08

## 2022-05-12 RX ORDER — PREDNISONE 20 MG/1
40 TABLET ORAL DAILY
Qty: 10 TABLET | Refills: 0 | Status: SHIPPED | OUTPATIENT
Start: 2022-05-12 | End: 2022-05-17

## 2022-05-12 RX ORDER — ONDANSETRON 4 MG/1
4 TABLET, ORALLY DISINTEGRATING ORAL EVERY 8 HOURS PRN
Qty: 10 TABLET | Refills: 0 | Status: SHIPPED | OUTPATIENT
Start: 2022-05-12 | End: 2022-05-15

## 2022-05-12 RX ORDER — ONDANSETRON 2 MG/ML
4 INJECTION INTRAMUSCULAR; INTRAVENOUS ONCE
Status: COMPLETED | OUTPATIENT
Start: 2022-05-12 | End: 2022-05-12

## 2022-05-12 RX ORDER — ACETAMINOPHEN 325 MG/1
975 TABLET ORAL ONCE
Status: COMPLETED | OUTPATIENT
Start: 2022-05-12 | End: 2022-05-12

## 2022-05-12 RX ORDER — PREDNISONE 20 MG/1
40 TABLET ORAL ONCE
Status: COMPLETED | OUTPATIENT
Start: 2022-05-12 | End: 2022-05-12

## 2022-05-12 RX ORDER — ALBUTEROL SULFATE 90 UG/1
2 AEROSOL, METERED RESPIRATORY (INHALATION) ONCE
Status: COMPLETED | OUTPATIENT
Start: 2022-05-12 | End: 2022-05-12

## 2022-05-12 RX ADMIN — ACETAMINOPHEN 975 MG: 325 TABLET, FILM COATED ORAL at 02:25

## 2022-05-12 RX ADMIN — ONDANSETRON 4 MG: 2 INJECTION INTRAMUSCULAR; INTRAVENOUS at 02:39

## 2022-05-12 RX ADMIN — ALBUTEROL SULFATE 2 PUFF: 90 AEROSOL, METERED RESPIRATORY (INHALATION) at 02:27

## 2022-05-12 RX ADMIN — SODIUM CHLORIDE 1000 ML: 9 INJECTION, SOLUTION INTRAVENOUS at 02:31

## 2022-05-12 RX ADMIN — SODIUM CHLORIDE 1000 ML: 9 INJECTION, SOLUTION INTRAVENOUS at 18:03

## 2022-05-12 RX ADMIN — PREDNISONE 40 MG: 20 TABLET ORAL at 02:25

## 2022-05-12 ASSESSMENT — ENCOUNTER SYMPTOMS
CHILLS: 1
WEAKNESS: 0
ABDOMINAL PAIN: 0
WEAKNESS: 1
NUMBNESS: 1
NAUSEA: 1
COUGH: 1
FEVER: 1
HEADACHES: 1
BACK PAIN: 0
NECK PAIN: 0
DIARRHEA: 1
HEADACHES: 0
DIZZINESS: 0
SHORTNESS OF BREATH: 0
SHORTNESS OF BREATH: 1
VOMITING: 1

## 2022-05-12 NOTE — ED PROVIDER NOTES
"  History   Chief Complaint:  Shortness of Breath and Cough       HPI   Manuel Phillip is a 42 year old female with history of asthma presenting with shortness of breath amongst other complaints.  Patient reports for the past day having fever, chills, headache, nausea, vomiting, diarrhea, cough and worsening difficulty breathing.  She denies purulent cough though feels that she cannot take a deep breath in.  She is roughly 4-1/2 months pregnant.  No complications this pregnancy.  She denies any chest pain, vaginal bleeding, dysuria, significant abdominal pain or other symptoms.  She is not COVID vaccinated.  She denies any known sick contacts.  She has been using her albuterol at home with minimal relief.    Review of Systems   Constitutional: Positive for chills and fever.   HENT: Positive for congestion.    Respiratory: Positive for cough and shortness of breath.    Cardiovascular: Negative for chest pain.   Gastrointestinal: Positive for diarrhea, nausea and vomiting. Negative for abdominal pain.   Genitourinary: Negative for vaginal bleeding.   Musculoskeletal: Negative for back pain.   Neurological: Positive for headaches. Negative for dizziness and weakness.   All other systems reviewed and are negative.        Allergies:  No Known Drug Allergies    Medications:    aspirin (ASA) 81 MG chewable tablet  doxylamine (UNISOM) 25 MG TABS tablet  insulin syringe-needle U-100 (29G X 1/2\" 0.5 ML) 29G X 1/2\" 0.5 ML miscellaneous  Prenatal Vit-Fe Fumarate-FA (PNV PRENATAL PLUS MULTIVITAMIN) 27-1 MG TABS per tablet  pyridOXINE (VITAMIN B6) 25 MG tablet  UNKNOWN TO PATIENT        Past Medical History:      Past Medical History:   Diagnosis Date     No pertinent past medical history 02/09/2022     Patient Active Problem List    Diagnosis Date Noted     Vitamin D deficiency disease 02/27/2013     Priority: Medium     CARDIOVASCULAR SCREENING; LDL GOAL LESS THAN 160 10/31/2010     Priority: Medium     Infertility, female, " "secondary 03/29/2010     Priority: Medium        Past Surgical History:      Past Surgical History:   Procedure Laterality Date     CHOLECYSTECTOMY, LAPOROSCOPIC  3/09     [facility] GENITAL SURG PROC, FEMALE UNLISTED      female circumcision        Family History:      Family History   Problem Relation Age of Onset     Family History Negative Other        Social History:  The patient presents to the ED with   Denies ETOH/drug use    Physical Exam     Patient Vitals for the past 24 hrs:   BP Temp Temp src Pulse Resp SpO2 Height Weight   05/12/22 0520 118/62 -- -- 88 18 99 % -- --   05/12/22 0418 -- -- -- 89 20 98 % -- --   05/12/22 0412 -- 98.1  F (36.7  C) Temporal -- -- -- -- --   05/12/22 0322 116/60 -- -- 100 18 95 % -- --   05/12/22 0248 117/68 -- -- 99 18 100 % -- --   05/12/22 0002 139/73 98.6  F (37  C) Temporal 106 18 97 % 1.803 m (5' 11\") 113.4 kg (250 lb)       Physical Exam  Nursing note and vitals reviewed.  Constitutional: Well nourished.   Eyes: Conjunctiva normal.  Pupils are equal, round, and reactive to light.   ENT: Nose normal. Mucous membranes pink and moist.    Neck: Normal range of motion.  CVS: Sinus tachycardia.  Normal heart sounds.  No murmur.  Pulmonary: Lungs with scant expiratory wheezing  GI: Abdomen soft. Nontender, nondistended. No rigidity or guarding.    MSK: No calf tenderness or swelling.  Neuro: Alert. Follows simple commands.  Skin: Skin is warm and dry. No rash noted.   Psychiatric: Normal affect.       Emergency Department Course   ECG:  ECG taken at 0211, ECG read at 0211  Normal sinus rhythm   Possible Left atrial enlargement   Borderline ECG   Rate 99 bpm. MS interval 128 ms. QRS duration 68 ms. QT/QTc 312/400 ms. P-R-T axes 47 46 8.     Imaging:  XR Chest 2 Views   Final Result   IMPRESSION: Stable chest with no acute cardiopulmonary abnormalities.        Report per radiology.     Laboratory:  Labs Ordered and Resulted from Time of ED Arrival to Time of ED Departure "   BASIC METABOLIC PANEL - Abnormal       Result Value    Sodium 136      Potassium 3.8      Chloride 108      Carbon Dioxide (CO2) 22      Anion Gap 6      Urea Nitrogen 8      Creatinine 0.46 (*)     Calcium 9.0      Glucose 121 (*)     GFR Estimate >90     HCG QUANTITATIVE PREGNANCY - Abnormal    hCG Quantitative 6,490 (*)    INFLUENZA A/B & SARS-COV2 PCR MULTIPLEX - Abnormal    Influenza A PCR Negative      Influenza B PCR Negative      RSV PCR Negative      SARS CoV2 PCR Positive (*)    D DIMER QUANTITATIVE - Abnormal    D-Dimer Quantitative 0.69 (*)    TROPONIN I - Normal    Troponin I High Sensitivity 4     CBC WITH PLATELETS AND DIFFERENTIAL    WBC Count 8.5      RBC Count 3.97      Hemoglobin 12.0      Hematocrit 36.4      MCV 92      MCH 30.2      MCHC 33.0      RDW 12.7      Platelet Count 288      % Neutrophils 79      % Lymphocytes 14      % Monocytes 5      % Eosinophils 2      % Basophils 0      % Immature Granulocytes 0      NRBCs per 100 WBC 0      Absolute Neutrophils 6.6      Absolute Lymphocytes 1.2      Absolute Monocytes 0.4      Absolute Eosinophils 0.2      Absolute Basophils 0.0      Absolute Immature Granulocytes 0.0      Absolute NRBCs 0.0           Emergency Department Course:    Reviewed:  I reviewed nursing notes, vitals, and past medical history    Assessments:   I performed a physical exam of the patient. Findings as above.      Patient rechecked and updated. Plan of care discussed and questions answered.     Interventions:  Medications   albuterol (PROVENTIL HFA/VENTOLIN HFA) inhaler (2 puffs Inhalation Given 5/12/22 0227)   predniSONE (DELTASONE) tablet 40 mg (40 mg Oral Given 5/12/22 0225)   acetaminophen (TYLENOL) tablet 975 mg (975 mg Oral Given 5/12/22 0225)   ondansetron (ZOFRAN) injection 4 mg (4 mg Intravenous Given 5/12/22 0239)   0.9% sodium chloride BOLUS (0 mLs Intravenous Stopped 5/12/22 0319)       Disposition:  The patient left AMA    Impression & Plan        Covid-19  Manuel Phillip was evaluated during a global COVID-19 pandemic, which necessitated consideration that the patient might be at risk for infection with the SARS-CoV-2 virus that causes COVID-19.   Applicable protocols for evaluation were followed during the patient's care.   COVID-19 was considered as part of the patient's evaluation. The plan for testing is: a test was obtained during this visit.     Medical Decision Making:  Manuel Phillip is a 42 year old female who presents with a myriad of complaints including shortness of breath, cough, fever, headache, nausea, vomiting and diarrhea.  She is nontoxic, clinically well-hydrated, in no significant distress on arrival.  She did have scant expiratory wheezing.  She was given albuterol inhaler and reported significant symptom improvement.  During her time in the ED she did test positive for COVID-19.  Screening EKG without focal ischemia or underlying arrhythmia.  High-sensitivity screening troponin negative.  She denies any active chest pain and I have a lower suspicion for ACS.  D-dimer elevated.  Patient was initially agreeable to undergo formal CT scan.  Risks of CT scan and pregnancy were discussed.  When patient reportedly arrived to CT she changed her mind.  I had an extensive discussion with patient regarding the risks of missed or delayed diagnoses.  She is wishing to defer CT imaging at this point in time though was agreeable to chest x-ray.  Fortunately x-ray without focal pneumonia, fluid overload, widened mediastinum or pneumothorax.  She does understand however that diagnosis of PE has not been excluded today.  Labs overall reassuring without evidence to suggest underlying sepsis or profound electrolyte derangement or anemia.  Fetal heart tones reassuring as completed by RN.  On reevaluation, she ambulates without hypoxia or significant work of breathing.  Given her unvaccinated status as well as pregnant state, I did discuss that she is at  risk for serious sequelae from COVID-19.  I did discuss antivirals as well as monoclonal antibody treatment.  She is wishing to discuss these with her OB team at this point in time.  Given her history of asthma, I will prescribe prednisone burst as well as albuterol.  ODT Zofran for symptom management and recommended p.o. hydration.  Counseled on the importance of maintaining pulse ox greater than 90%, pulse ox provided today.  Monitor for increased work of breathing, lethargy, worsening chest pain or should symptoms worsen or change to promptly represent to the ED for further evaluation.  Planning close outpatient follow-up.  All questions addressed.    The patient has decided not to proceed with further recommended testing or treatment to determine the cause of their symptoms. The risks and alternatives to the recommendation were discussed and the patient voiced understanding. The patient appears clinically to have capacity to make this decision. The patient was instructed that she could return to the ER at any time to complete the testing or treatment.   I encouraged the patient to follow-up with his/her primary doctor as soon as possible.    Diagnosis:    ICD-10-CM    1. COVID-19 virus infection  U07.1    2. Dyspnea, unspecified type  R06.00        Discharge Medications:  Discharge Medication List as of 5/12/2022  5:09 AM      START taking these medications    Details   albuterol (PROAIR HFA/PROVENTIL HFA/VENTOLIN HFA) 108 (90 Base) MCG/ACT inhaler Inhale 2 puffs into the lungs every 6 hours as needed for shortness of breath / dyspnea or wheezing, Disp-18 g, R-0, Local PrintPharmacy may dispense brand covered by insurance (Proair, or proventil or ventolin or generic albuterol inhaler)      ondansetron (ZOFRAN ODT) 4 MG ODT tab Take 1 tablet (4 mg) by mouth every 8 hours as needed for nausea, Disp-10 tablet, R-0, Local Print      predniSONE (DELTASONE) 20 MG tablet Take 2 tablets (40 mg) by mouth daily for 5  days, Disp-10 tablet, R-0, Local Print                    Alda Hubbard, DO  05/12/22 0612

## 2022-05-12 NOTE — ED NOTES
"Pt reports no pain at this time, reports feeling \"much better\" following med adm. Reports improvement in breathing following albuterol inhaler use. Denies needing anything at this time.   "

## 2022-05-12 NOTE — ED TRIAGE NOTES
Presents to triage with c/o R arm and R leg numbness that began upon waking up this morning at about 0900. Denies visual changes, balance problems, weakness, or headache. Was seen in the ED yesterday and tested positive for covid. Positive dimer at that time. MD wanted to r/o blood clot but patient declined at that time. ABCs intact, denies SOB at this time. Stroke eval called to triage, Dr. Mulligan evaluated patient and did not call stroke code at this time.     Triage Assessment     Row Name 05/12/22 2399       Triage Assessment (Adult)    Airway WDL WDL       Respiratory WDL    Respiratory WDL WDL       Cardiac WDL    Cardiac WDL WDL

## 2022-05-12 NOTE — ED NOTES
Strong FHT,  bpm, pt reports feeling baby kicking, process explained to pt, verbalized understanding.

## 2022-05-12 NOTE — TELEPHONE ENCOUNTER
Patient calling:  Was seen ER last night.  See note.  + Covid.   Was told to follow with OB due to elevated D-Dimer and was at high risk for blood clots.  Has appt 5/13. Should this be in person or a telephone visit.    Please advise.  Monique Willis RN

## 2022-05-12 NOTE — ED NOTES
Call received from CT that pt was refusing CT until pt could discuss CT further w/ DO. Provider notified via ED communications.

## 2022-05-12 NOTE — ED PROVIDER NOTES
History   Chief Complaint:  Numbness       HPI   Manuel Phillip is a  42 year old female who presents with right leg and arm numbness. The patient states that 8 hours ago she woke up and had numbness and weakness in her entire right arm and leg. She says that it feels weak earlier in the day.. She notes the symptoms have mostly resolved but still a little numbness that she describes in some ways as a tight feeling in her arm. She denies neck pain or headache. She was in yesterday and tested positive for COVID. She notes her symptoms that she presented for yesterday including shortness of breath have since resolved.    Review of Systems   Respiratory: Negative for shortness of breath.    Cardiovascular: Negative for chest pain.   Musculoskeletal: Negative for neck pain.   Neurological: Positive for weakness and numbness. Negative for headaches.   All other systems reviewed and are negative.    Allergies:  The patient has no known allergies.     Medications:  albuterol   aspirin   doxylamine   ondansetron  predniSONE  pyridOXINE    Past Medical History:     The patient denies past medical history.     Past Surgical History:    Cholecystectomy  ZZC genital surgery     Social History:  The patient presents to the ED alone.    Physical Exam     Patient Vitals for the past 24 hrs:   BP Temp Temp src Pulse Resp SpO2 Weight   22 1921 -- 98  F (36.7  C) Oral -- -- 98 % --   22 1625 (!) 157/75 (!) 96.7  F (35.9  C) Temporal 105 20 98 % 113.4 kg (250 lb)       Physical Exam  General: Adult female sitting upright  Eyes: PERRL, Conjunctive within normal limits.  No scleral icterus.  ENT: Moist mucous membranes, oropharynx clear.   CV: Normal S1S2, no murmur, rub or gallop. Regular rate and rhythm.  Radial pulses palpable bilaterally.  Resp: Clear to auscultation bilaterally, no wheezes, rales or rhonchi. Normal respiratory effort.  GI: Abdomen is soft and nontender.    MSK: No edema, symmetric bilateral upper and  lower extremities. Nontender. Normal active range of motion.  Skin: Warm and dry. No rashes or lesions or ecchymoses on visible skin.  Neuro: Alert and oriented. Responds appropriately to all questions and commands. No focal findings appreciated. Normal muscle tone.  No facial asymmetry.  Speech is fluent.  Sensation intact to light touch over all dermatomes of the bilateral upper and lower extremities.  5 out of 5 finger abduction, thumb opposition and wrist extension strength bilaterally.  Psych: Normal mood and affect. Pleasant.    Emergency Department Course   ECG  ECG obtained at 1926, ECG read at 1931  Normal Sinus rhythm  Normal ECG  Rate 88 bpm. DC interval 146 ms. QRS duration 82 ms. QT/QTc 364/440 ms. P-R-T axes 49 45 12.    Imaging:  US Upper Extremity Venous Duplex Right   Final Result   IMPRESSION:   1.  No deep venous thrombosis in the right upper extremity.        Report per radiology    Laboratory:  Labs Ordered and Resulted from Time of ED Arrival to Time of ED Departure   GLUCOSE BY METER - Abnormal       Result Value    GLUCOSE BY METER POCT 168 (*)    BASIC METABOLIC PANEL - Abnormal    Sodium 133      Potassium 4.3      Chloride 105      Carbon Dioxide (CO2) 21      Anion Gap 7      Urea Nitrogen 7      Creatinine 0.41 (*)     Calcium 9.1      Glucose 153 (*)     GFR Estimate >90     CBC WITH PLATELETS AND DIFFERENTIAL - Abnormal    WBC Count 10.2      RBC Count 4.01      Hemoglobin 12.1      Hematocrit 37.0      MCV 92      MCH 30.2      MCHC 32.7      RDW 12.7      Platelet Count 279      % Neutrophils 86      % Lymphocytes 9      % Monocytes 4      % Eosinophils 0      % Basophils 0      % Immature Granulocytes 1      NRBCs per 100 WBC 0      Absolute Neutrophils 8.7 (*)     Absolute Lymphocytes 1.0      Absolute Monocytes 0.4      Absolute Eosinophils 0.0      Absolute Basophils 0.0      Absolute Immature Granulocytes 0.1      Absolute NRBCs 0.0        Emergency Department Course:          Reviewed:  I reviewed nursing notes, vitals, past medical history and Care Everywhere    Assessments:  1727 I obtained history and examined the patient as noted above.   2031 I rechecked the patient and explained findings.  She notes she did not feel she needed or wanted the MRI when she was placed in the MRI machine.  She declined to stay in the hospital any further for any further work-up.  She is agreeable with the plan to leave AMA, as her work-up was not complete.  She understood the importance of returning should symptoms progress or worsen.    Interventions:  1803 Bolus 1000mL IV    Disposition:  The patient left AMA.     Manuel Phillip has made the decision to leave the current treatment against medical advice.  She has been told that her symptoms may possibly be caused by acute stroke or other neurologic process. She has been informed and understands the inherent risks, including death, permanent disability.  She has decided to accept the responsibility for her decision.  She has the capacity to make this informed decision.  She is alert and oriented, understands these instructions, and is able to ambulate.  Manuel Phillip and all necessary parties have been advised that they may return at any time for further evaluation or treatment.    Impression & Plan       Covid-19  Manuel Phillip was evaluated during a global COVID-19 pandemic, which necessitated consideration that the patient might be at risk for infection with the SARS-CoV-2 virus that causes COVID-19.   Applicable protocols for evaluation were followed during the patient's care.   COVID-19 was considered as part of the patient's evaluation. The plan for testing is:  a test was obtained at a previous visit and reviewed & considered today.    MDM:   Manuel Phillip is a 42-year-old female currently pregnant and COVID-positive who presents emergency department concerns for right arm and leg numbness.  This is been all day and improves throughout the day.  She  was concerned because yesterday she was told she may have a blood clot so came to the emergency department because of the arm and leg symptoms symptoms.  There did seem some confusion about yesterday's evaluation as patient thought blood clot meant in her brain.  I discussed with the patient that yesterday's concerns for possible pulmonary embolism or blood clot in the lung, which did reassure her.  She has no current respiratory or cardiac symptoms and CT chest seems unlikely to be beneficial in this clinical setting.  With regards to the numbness in her extremities, she does not have any focal findings on examination today but was subjectively still noting numbness in her right arm and leg.  MRI of the brain was recommended.  She was partially through the MRI study when she asked the technician to stop the study.  I discussed the difficulty with further assessment without imaging but the patient preferred to avoid any further imaging.  She understands the risk of doing so.  At this time as she is nearly asymptomatic without any objective findings, I feel that we are unlikely to be missing an acute life-threatening event.  I did however tell the patient that her evaluation is not complete without this imaging.  She preferred to leave AGAINST MEDICAL ADVICE.  Recommended close follow-up with outpatient providers.    Diagnosis:    ICD-10-CM    1. Extremity numbness  R20.0     left upper and lower         Scribe Disclosure:  I, Hai Goodman, am serving as a scribe at 5:27 PM on 5/12/2022 to document services personally performed by Diandra Capone MD based on my observations and the provider's statements to me.          Diandra Capone MD  05/12/22 3953

## 2022-05-12 NOTE — DISCHARGE INSTRUCTIONS

## 2022-05-12 NOTE — TELEPHONE ENCOUNTER
Pt is running a fever and coughing symptoms started today     Pt states that she does not have a thermometer, but that she feels like she is running a fever    Pt states that she is feeling short of breath at rest and that she has an inhaler from 2 years ago and is wondering if she can use it because she is pregnant.    Writer shared that I cannot advise her to use the inhaler and pt is not sure what the medication in the inhaler is but states that it has a blue top    Per protocol - Go to ED now     Care advice given per protocol and when to call back. Pt verbalized understanding and agrees to plan of care.    Slime Warren RN  [facility] Nurse Advisor  11:23 PM 5/11/2022      COVID 19 Nurse Triage Plan/Patient Instructions    Please be aware that novel coronavirus (COVID-19) may be circulating in the community. If you develop symptoms such as fever, cough, or SOB or if you have concerns about the presence of another infection including coronavirus (COVID-19), please contact your health care provider or visit [email or web address].     Disposition/Instructions    ED Visit recommended. Follow protocol based instructions.     Bring Your Own Device:  Please also bring your smart device(s) (smart phones, tablets, laptops) and their charging cables for your personal use and to communicate with your care team during your visit.    Thank you for taking steps to prevent the spread of this virus.  o Limit your contact with others.  o Wear a simple mask to cover your cough.  o Wash your hands well and often.    Resources    [facility]: About COVID-19: [email or web address]    CDC: What to Do If You're Sick: www.cdc.gov/coronavirus/2019-ncov/about/steps-when-sick.html    CDC: Ending Home Isolation: www.cdc.gov/coronavirus/2019-ncov/hcp/disposition-in-home-patients.html     CDC: Caring for Someone: www.cdc.gov/coronavirus/2019-ncov/if-you-are-sick/care-for-someone.html     [facility]: Interim Guidance for  Hospital Discharge to Home: www.health.[facility].[place].us/diseases/coronavirus/hcp/hospdischarge.pdf    [facility] clinical trials (COVID-19 research studies): [email or web address]     Below are the COVID-19 hotlines at the [place] Department of Health ([facility]). Interpreters are available.   o For health questions: Call 287-575-8693 or 1-960.137.6477 (7 a.m. to 7 p.m.)  o For questions about schools and childcare: Call 325-569-5396 or 1-672.707.7111 (7 a.m. to 7 p.m.)                           Reason for Disposition    MODERATE difficulty breathing (e.g., speaks in phrases, SOB even at rest, pulse 100-120)    Additional Information    Negative: SEVERE difficulty breathing (e.g., struggling for each breath, speaks in single words)    Negative: Difficult to awaken or acting confused (e.g., disoriented, slurred speech)    Negative: Bluish (or gray) lips or face now    Negative: Shock suspected (e.g., cold/pale/clammy skin, too weak to stand, low BP, rapid pulse)    Negative: Sounds like a life-threatening emergency to the triager    Negative: [1] Diagnosed or suspected COVID-19 AND [2] symptoms lasting 3 or more weeks    Negative: [1] COVID-19 exposure AND [2] no symptoms    Negative: COVID-19 vaccine reaction suspected (e.g., fever, headache, muscle aches) occurring 1 to 3 days after getting vaccine    Negative: COVID-19 vaccine, questions about    Negative: [1] Lives with someone known to have influenza (flu test positive) AND [2] flu-like symptoms (e.g., cough, runny nose, sore throat, SOB; with or without fever)    Negative: [1] Adult with possible COVID-19 symptoms AND [2] triager concerned about severity of symptoms or other causes    Negative: COVID-19 and breastfeeding, questions about    Negative: SEVERE or constant chest pain or pressure  (Exception: Mild central chest pain, present only when coughing.)    Protocols used: CORONAVIRUS (COVID-19) DIAGNOSED OR [id number]  1.18.2022

## 2022-05-12 NOTE — ED TRIAGE NOTES
Here for concern coughing, sob, n/v, body aches, muscle/joint aches started this morning at 6am. Is currently 4.5months pregnant. Denies any abdominal pain or vaginal bleeding. ABCs intact.      Triage Assessment     Row Name 05/12/22 0001       Triage Assessment (Adult)    Airway WDL WDL       Respiratory WDL    Respiratory WDL cough    Cough Type dry       Cardiac WDL    Cardiac WDL WDL

## 2022-05-13 ENCOUNTER — VIRTUAL VISIT (OUTPATIENT)
Dept: OBGYN | Facility: CLINIC | Age: 42
End: 2022-05-13
Payer: COMMERCIAL

## 2022-05-13 DIAGNOSIS — Z53.9 NO SHOW: Primary | ICD-10-CM

## 2022-05-13 LAB
ATRIAL RATE - MUSE: 88 BPM
DIASTOLIC BLOOD PRESSURE - MUSE: NORMAL MMHG
INTERPRETATION ECG - MUSE: NORMAL
P AXIS - MUSE: 49 DEGREES
PR INTERVAL - MUSE: 146 MS
QRS DURATION - MUSE: 82 MS
QT - MUSE: 364 MS
QTC - MUSE: 440 MS
R AXIS - MUSE: 45 DEGREES
SYSTOLIC BLOOD PRESSURE - MUSE: NORMAL MMHG
T AXIS - MUSE: 12 DEGREES
VENTRICULAR RATE- MUSE: 88 BPM

## 2022-05-13 NOTE — PROGRESS NOTES
Manuel Phillip is a 42 year old female who is being evaluated via a billable telephone visit.      What phone number would you like to be contacted at? [phone]  How would you like to obtain your AVS? Clif Phillip is a 42 year old female who presents for virtual visit today for the following health issue(s):  Patient presents with:  Prenatal Care: 21 5/7 weeks      Additional information:     Attempted to call x2, did not reach.  LMOR.  Can reschedule for repeat telephone visit later this week if desired.      42 year old  at 21w5d with covid infection diagnosed 22 (yesterday).  Not vaccinated.  Would encourage paxlovid/monoclonal AB for symptomatic covid if she calls back to inquire about this.  Has MFM US scheduled , called them so they are aware of the possible need to push back her anatomy US.     Constanza House MD, MPH  [facility] OB/Gyn

## 2022-05-13 NOTE — PROGRESS NOTES
Reviewed Covid + result on 5/12, symptom onset of 5/11 with Dr. López. Patient scheduled on 5/17/22 for Comprehensive US at 22w2d. Patient unvaccinated against COVID. Seen in ED on 5/11 for shortness of breath, had fevers as well that day. After reviewing with MD, due to current gestational age, plan to see patient at scheduled time on 5/17 at 11AM as long as symptoms are improving. Patient will be 6 days from symptom onset at time of appointment.

## 2022-05-13 NOTE — ED NOTES
"Pt returns from MRI. Writer ask pt what happened with the MRI that caused the pt to not want to complete the scan. Pt states, \"I started to feel dizzy and not feeling too good. I feel better now so can I go home?\".     Dr. Capone made aware of findings.  "

## 2022-05-13 NOTE — ED NOTES
MRI staff calls to report that the during the MRI scan pt stated that she did not want to continue with the scan anymore. Staff is stopping the scan and will send the pt back.

## 2022-05-17 ENCOUNTER — DOCUMENTATION ONLY (OUTPATIENT)
Dept: MATERNAL FETAL MEDICINE | Facility: CLINIC | Age: 42
End: 2022-05-17
Payer: COMMERCIAL

## 2022-05-17 NOTE — PROGRESS NOTES
Reviewed with Dr. Adair that patient is scheduled today for ultrasound but was Covid positive on 5/12 with symptom onset 5/11. Notes from [facility] also reviewed from 5/13. Dr. Adair requests to have patient rescheduled to next week out of period of transmissibility. Spoke with Manuel at 0915 who states she is feeling much better but was agreeable to reschedule appointment. Patient transferred to [facility].

## 2022-05-27 ENCOUNTER — OFFICE VISIT (OUTPATIENT)
Dept: MATERNAL FETAL MEDICINE | Facility: CLINIC | Age: 42
End: 2022-05-27
Attending: OBSTETRICS & GYNECOLOGY
Payer: COMMERCIAL

## 2022-05-27 ENCOUNTER — HOSPITAL ENCOUNTER (OUTPATIENT)
Dept: ULTRASOUND IMAGING | Facility: CLINIC | Age: 42
Discharge: HOME | End: 2022-05-27
Attending: OBSTETRICS & GYNECOLOGY
Payer: COMMERCIAL

## 2022-05-27 DIAGNOSIS — O26.90 PREGNANCY RELATED CONDITION: ICD-10-CM

## 2022-05-27 DIAGNOSIS — Z36.2 ENCOUNTER FOR FOLLOW-UP ULTRASOUND OF FETAL ANATOMY: ICD-10-CM

## 2022-05-27 DIAGNOSIS — O09.812 PREGNANCY RESULTING FROM IN VITRO FERTILIZATION IN SECOND TRIMESTER: Primary | ICD-10-CM

## 2022-05-27 PROCEDURE — 76811 OB US DETAILED SNGL FETUS: CPT | Mod: 26 | Performed by: OBSTETRICS & GYNECOLOGY

## 2022-05-27 PROCEDURE — 76811 OB US DETAILED SNGL FETUS: CPT

## 2022-05-27 NOTE — PROGRESS NOTES
"Please see \"Imaging\" tab under Chart Review for full details.    Sarah Thapa MD  Maternal Fetal Medicine    "

## 2022-06-09 ENCOUNTER — PRENATAL OFFICE VISIT (OUTPATIENT)
Dept: MIDWIFE SERVICES | Facility: CLINIC | Age: 42
End: 2022-06-09
Payer: COMMERCIAL

## 2022-06-09 VITALS — DIASTOLIC BLOOD PRESSURE: 68 MMHG | SYSTOLIC BLOOD PRESSURE: 110 MMHG | BODY MASS INDEX: 38.49 KG/M2 | WEIGHT: 276 LBS

## 2022-06-09 DIAGNOSIS — Z34.80 PRENATAL CARE, SUBSEQUENT PREGNANCY, UNSPECIFIED TRIMESTER: ICD-10-CM

## 2022-06-09 DIAGNOSIS — O09.812 PREGNANCY RESULTING FROM IN VITRO FERTILIZATION, SECOND TRIMESTER: Primary | ICD-10-CM

## 2022-06-09 PROBLEM — J45.909 ASTHMA: Status: ACTIVE | Noted: 2022-06-09

## 2022-06-09 PROCEDURE — 99207 PR PRENATAL VISIT: CPT

## 2022-06-09 NOTE — NURSING NOTE
"Chief Complaint   Patient presents with     Prenatal Care   25w4d  No concerns    initial /68   Wt 125.2 kg (276 lb)   LMP 12/15/2021   BMI 38.49 kg/m   Estimated body mass index is 38.49 kg/m  as calculated from the following:    Height as of 5/12/22: 1.803 m (5' 11\").    Weight as of this encounter: 125.2 kg (276 lb).  BP completed using cuff size large.  Kailyn Akins CMA    "

## 2022-06-09 NOTE — PROGRESS NOTES
S: Feels great, has been feeling nauseous  and vomits 1-2 a day after breakfast.  Has started feeling fetal movement.  Denies uterine cramping, vaginal bleeding or leaking of fluid  O: Vitals: /68   Wt 125.2 kg (276 lb)   LMP 12/15/2021   BMI 38.49 kg/m    BMI= Body mass index is 38.49 kg/m .  Exam:  Constitutional: healthy, alert and no distress  Respiratory: respirations even and unlabored  Gastrointestinal: Abdomen soft, non-tender. Fundus measures appropriate for gestational age. Fetal heart tones hear without difficulty and within normal limits  : Deferred  Psychiatric: mentation appears normal and affect normal/bright  A: (O09.812) Pregnancy resulting from in vitro fertilization, second trimester  (primary encounter diagnosis)  Comment: nauseous and vomiting  Plan: doxylamine (UNISOM) 25 MG TABS tablet  Discussed GCT and the importance of early screening due to her BG (168 mg/dL) at ED on 05/12.       PAGE HUMPHREY CNM

## 2022-06-23 ENCOUNTER — PRENATAL OFFICE VISIT (OUTPATIENT)
Dept: OBGYN | Facility: CLINIC | Age: 42
End: 2022-06-23
Payer: COMMERCIAL

## 2022-06-23 VITALS — WEIGHT: 277 LBS | BODY MASS INDEX: 38.63 KG/M2 | SYSTOLIC BLOOD PRESSURE: 130 MMHG | DIASTOLIC BLOOD PRESSURE: 72 MMHG

## 2022-06-23 DIAGNOSIS — O24.419 GESTATIONAL DIABETES: Primary | ICD-10-CM

## 2022-06-23 DIAGNOSIS — O09.529 HIGH-RISK PREGNANCY, ELDERLY MULTIGRAVIDA, UNSPECIFIED TRIMESTER: Primary | ICD-10-CM

## 2022-06-23 LAB
ERYTHROCYTE [DISTWIDTH] IN BLOOD BY AUTOMATED COUNT: 13 % (ref 10–15)
GLUCOSE 1H P 50 G GLC PO SERPL-MCNC: 203 MG/DL (ref 70–129)
HCT VFR BLD AUTO: 33.8 % (ref 35–47)
HGB BLD-MCNC: 11.3 G/DL (ref 11.7–15.7)
MCH RBC QN AUTO: 30.5 PG (ref 26.5–33)
MCHC RBC AUTO-ENTMCNC: 33.4 G/DL (ref 31.5–36.5)
MCV RBC AUTO: 91 FL (ref 78–100)
PLATELET # BLD AUTO: 290 10E3/UL (ref 150–450)
RBC # BLD AUTO: 3.71 10E6/UL (ref 3.8–5.2)
T PALLIDUM AB SER QL: NONREACTIVE
WBC # BLD AUTO: 8.1 10E3/UL (ref 4–11)

## 2022-06-23 PROCEDURE — 85027 COMPLETE CBC AUTOMATED: CPT | Performed by: OBSTETRICS & GYNECOLOGY

## 2022-06-23 PROCEDURE — 99207 PR PRENATAL VISIT: CPT | Performed by: OBSTETRICS & GYNECOLOGY

## 2022-06-23 PROCEDURE — 82950 GLUCOSE TEST: CPT | Performed by: OBSTETRICS & GYNECOLOGY

## 2022-06-23 PROCEDURE — 36415 COLL VENOUS BLD VENIPUNCTURE: CPT | Performed by: OBSTETRICS & GYNECOLOGY

## 2022-06-23 PROCEDURE — 86780 TREPONEMA PALLIDUM: CPT | Performed by: OBSTETRICS & GYNECOLOGY

## 2022-06-23 NOTE — PROGRESS NOTES
Manuel Phillip is a 42 year old female, 27w4d, Estimated Date of Delivery: Sep 18, 2022 who presents for prenatal care.    Good fetal movement.  Patient is doing well without concerns.  She has a follow-up [facility] ultrasound tomorrow    A/P:, Intrauterine pregnancy 27-4/7 weeks gestation, AMA, IVF pregnancy, declines  testing.  Mild polyhydramnios noted on level 2 ultrasound.  Patient to have a blood sugar screen done today for gestational diabetes.  Patient's recovered from COVID 2022.  No record of COVID vaccination.  Signs and symptoms of concern discussed the patient will call

## 2022-06-23 NOTE — PATIENT INSTRUCTIONS
You can reach your [facility] Care Team any time of the day by calling [phone]. This number will put you in touch with the 24 hour nurse line if the clinic is closed.    To contact your OB/GYN Station Coordinator/Surgery Scheduler please call [phone]. This is a direct number for your care team between 8 a.m. and 4 p.m. Monday through Friday.    [facility] is open for your convenience:  Monday through Friday 8 a.m. to 6 p.m.  Closed weekends and all major holidays.

## 2022-06-23 NOTE — NURSING NOTE
"Chief Complaint   Patient presents with     Prenatal Care       Initial /72   Wt 125.6 kg (277 lb)   LMP 12/15/2021   BMI 38.63 kg/m   Estimated body mass index is 38.63 kg/m  as calculated from the following:    Height as of 22: 1.803 m (5' 11\").    Weight as of this encounter: 125.6 kg (277 lb).  BP completed using cuff size: regular    Questioned patient about current smoking habits.  Pt. has never smoked.          27w4d          Rosibel Nick, [facility]              "

## 2022-06-24 ENCOUNTER — OFFICE VISIT (OUTPATIENT)
Dept: MATERNAL FETAL MEDICINE | Facility: CLINIC | Age: 42
End: 2022-06-24
Attending: OBSTETRICS & GYNECOLOGY
Payer: COMMERCIAL

## 2022-06-24 ENCOUNTER — HOSPITAL ENCOUNTER (OUTPATIENT)
Dept: ULTRASOUND IMAGING | Facility: CLINIC | Age: 42
Discharge: HOME | End: 2022-06-24
Attending: OBSTETRICS & GYNECOLOGY
Payer: COMMERCIAL

## 2022-06-24 ENCOUNTER — TELEPHONE (OUTPATIENT)
Dept: OBGYN | Facility: CLINIC | Age: 42
End: 2022-06-24

## 2022-06-24 DIAGNOSIS — Z36.2 ENCOUNTER FOR FOLLOW-UP ULTRASOUND OF FETAL ANATOMY: ICD-10-CM

## 2022-06-24 DIAGNOSIS — O09.523 MULTIGRAVIDA OF ADVANCED MATERNAL AGE IN THIRD TRIMESTER: Primary | ICD-10-CM

## 2022-06-24 PROCEDURE — 76816 OB US FOLLOW-UP PER FETUS: CPT

## 2022-06-24 PROCEDURE — 76816 OB US FOLLOW-UP PER FETUS: CPT | Mod: 26 | Performed by: OBSTETRICS & GYNECOLOGY

## 2022-06-24 NOTE — PROGRESS NOTES
"Please see \"Imaging\" tab under \"Chart Review\" for details of today's US at the [facility].    Thang Reyes MD  Maternal-Fetal Medicine    "

## 2022-06-24 NOTE — TELEPHONE ENCOUNTER
Diabetes Education Scheduling Outreach #1:    Call to patient to schedule. Left message with phone number to call to schedule.    Plan for 2nd outreach attempt within 1 business day.    Alexsandra Goldsmith OnCall  Diabetes and Nutrition Scheduling

## 2022-07-01 DIAGNOSIS — B19.20 HEPATITIS C TEST POSITIVE: Primary | ICD-10-CM

## 2022-07-06 ENCOUNTER — PRENATAL OFFICE VISIT (OUTPATIENT)
Dept: OBGYN | Facility: CLINIC | Age: 42
End: 2022-07-06
Payer: COMMERCIAL

## 2022-07-06 VITALS — WEIGHT: 275 LBS | BODY MASS INDEX: 38.35 KG/M2 | SYSTOLIC BLOOD PRESSURE: 128 MMHG | DIASTOLIC BLOOD PRESSURE: 68 MMHG

## 2022-07-06 DIAGNOSIS — Z23 NEED FOR TDAP VACCINATION: ICD-10-CM

## 2022-07-06 DIAGNOSIS — O99.213 OBESITY AFFECTING PREGNANCY IN THIRD TRIMESTER: ICD-10-CM

## 2022-07-06 DIAGNOSIS — O09.813 PREGNANCY RESULTING FROM IN VITRO FERTILIZATION IN THIRD TRIMESTER: ICD-10-CM

## 2022-07-06 DIAGNOSIS — O98.512 COVID-19 AFFECTING PREGNANCY IN SECOND TRIMESTER: ICD-10-CM

## 2022-07-06 DIAGNOSIS — B19.20 HEPATITIS C TEST POSITIVE: ICD-10-CM

## 2022-07-06 DIAGNOSIS — O24.410 WHITE CLASSIFICATION A1 GESTATIONAL DIABETES MELLITUS (GDM), DIET CONTROLLED: Primary | ICD-10-CM

## 2022-07-06 DIAGNOSIS — U07.1 COVID-19 AFFECTING PREGNANCY IN SECOND TRIMESTER: ICD-10-CM

## 2022-07-06 DIAGNOSIS — O09.523 MULTIGRAVIDA OF ADVANCED MATERNAL AGE IN THIRD TRIMESTER: ICD-10-CM

## 2022-07-06 PROCEDURE — 90715 TDAP VACCINE 7 YRS/> IM: CPT | Performed by: OBSTETRICS & GYNECOLOGY

## 2022-07-06 PROCEDURE — 90471 IMMUNIZATION ADMIN: CPT | Performed by: OBSTETRICS & GYNECOLOGY

## 2022-07-06 PROCEDURE — 36415 COLL VENOUS BLD VENIPUNCTURE: CPT | Performed by: OBSTETRICS & GYNECOLOGY

## 2022-07-06 PROCEDURE — 99207 PR COMPLICATED OB VISIT: CPT | Performed by: OBSTETRICS & GYNECOLOGY

## 2022-07-06 PROCEDURE — 87522 HEPATITIS C REVRS TRNSCRPJ: CPT | Performed by: OBSTETRICS & GYNECOLOGY

## 2022-07-06 NOTE — NURSING NOTE
"Chief Complaint   Patient presents with     Prenatal Care     29 weeks 3 days- no concerns        Initial /68 (BP Location: Right arm, Patient Position: Sitting, Cuff Size: Adult Large)   Wt 124.7 kg (275 lb)   LMP 12/15/2021   BMI 38.35 kg/m   Estimated body mass index is 38.35 kg/m  as calculated from the following:    Height as of 22: 1.803 m (5' 11\").    Weight as of this encounter: 124.7 kg (275 lb).  BP completed using cuff size: large    Questioned patient about current smoking habits.  Pt. has never smoked.          The following HM Due: NONE    29w3d  Lacho Luo CMA                "

## 2022-07-06 NOTE — PROGRESS NOTES
No c/o's.  Pt newly Dx'd GDM but has first DM Ed visit on Friday. Reinforced need for compliance. TDaP today. Pt had Pos HCAb at [facility], needs HC RNA drawn today.  Has MFM U/S in 2 weeks for EFW.   labor/Premature rupture of membranes precautions reviewed.  RTC 2 weeks.    Encounter Diagnoses   Name Primary?     White classification A1 gestational diabetes mellitus (GDM), diet controlled Yes     Obesity affecting pregnancy in third trimester      Multigravida of advanced maternal age in third trimester      Pregnancy resulting from in vitro fertilization in third trimester      COVID-19 affecting pregnancy in second trimester        Risk factors listed above are stable and being addressed as noted.    Shan Yepez MD  [facility] [place]

## 2022-07-07 LAB — HCV RNA SERPL NAA+PROBE-ACNC: NOT DETECTED IU/ML

## 2022-07-08 ENCOUNTER — VIRTUAL VISIT (OUTPATIENT)
Dept: EDUCATION SERVICES | Facility: CLINIC | Age: 42
End: 2022-07-08
Payer: COMMERCIAL

## 2022-07-08 DIAGNOSIS — O24.410 DIET CONTROLLED GESTATIONAL DIABETES MELLITUS (GDM), ANTEPARTUM: Primary | ICD-10-CM

## 2022-07-08 PROCEDURE — G0108 DIAB MANAGE TRN  PER INDIV: HCPCS | Mod: AE | Performed by: DIETITIAN, REGISTERED

## 2022-07-08 RX ORDER — BLOOD-GLUCOSE METER
1 EACH MISCELLANEOUS ONCE
Qty: 1 KIT | Refills: 0 | Status: SHIPPED | OUTPATIENT
Start: 2022-07-08 | End: 2022-07-08

## 2022-07-08 RX ORDER — LANCETS
100 EACH MISCELLANEOUS 4 TIMES DAILY
Qty: 200 EACH | Refills: 4 | Status: SHIPPED | OUTPATIENT
Start: 2022-07-08 | End: 2024-07-16

## 2022-07-08 NOTE — LETTER
2022         RE: Manuel Phillip  [address] [address]        Dear Colleague,    Thank you for referring your patient, Manuel Phillip, to the [facility]. Please see a copy of my visit note below.    Diabetes Self-Management Education & Support      SUBJECTIVE/OBJECTIVE:  Presents for education related to gestational diabetes.    Accompanied by: Self  Gestational weeks: 29w5d  Number of previous pregnancies: 2  Had any babies over 9 lbs: Yes  Previously had Gestational Diabetes: No  Have you ever had thyroid problems or taken thyroid medication?: No  Heart disease, mitral valve prolapse or rheumatic fever?: No  Hypertension : No  High Cholesterol: No  High Triglycerides: No  Do you use tobacco products?: No  Do you drink beer, wine or hard liquor?: No    Wt Readings from Last 3 Encounters:   22 124.7 kg (275 lb)   22 125.6 kg (277 lb)   22 125.2 kg (276 lb)       Cultural Influences/Ethnic Background:  Not  or     Estimated Date of Delivery: Sep 18, 2022    1 hour OGTT  Lab Results   Component Value Date    GLU1 203 (H) 2022         3 hour OGTT    Fasting  No results found for: GTTGF    1 hour  No results found for: GTTG1    2 hour  No results found for: GTTG2    3 hour  No results found for: GTTG3       Lifestyle and Health Behaviors:  Pre-pregnancy weight (lbs): 230  Exercise:: Currently not exercising  Barrier to exercise: Safety  Meal planning/habits: Avoiding sweets, Frequent snacking  Meals include: Breakfast  Breakfast: 10:30-11:00 am: Special K cereal  Lunch: rice, meat, sometimes carrots or broccoli  Dinner: bread and cheese OR leftovers  Snacks: sometimes cereal at bedtime  Beverages: Water  How many servings of fruits/vegetables per day: 1  Biggest challenges to healthy eating: Other  Pre- vitamin?: Yes  Experiencing nausea?: Yes  Experiencing heartburn?: Yes    Healthy Coping:  Emotional response to diabetes:  "Concern for health and well-being  Informal Support system:: Spouse  Stage of change: PREPARATION (Decided to change - considering how)    Current Management:  Taking medications for gestational diabetes?: No    ASSESSMENT:  Noted significantly elevated lab glucose value. Pt reports no hx of GDM, though previous babies >9#, no fam hx T2DM.  does have T2DM. Pt reports challenges \"no time\" to make meals, often has leftovers or sandwiches, does avoid sweets and sugary beverages. Discussed current food practices. Noted hx of obesity. Pt receptive to meal plan recommendations.     INTERVENTION:  Ordered Contour Next One meter and supplies. Pt's  uses this meter or monitoring his blood sugars (hx of T2DM) and will be able to assist patient.     Educational topics covered today:  GDM diagnosis, pathophysiology, Risks and Complications of GDM, Means of controlling GDM, Using a Blood Glucose Monitor, Blood Glucose Goals, Logging and Interpreting Glucose Results, Ketone Testing, When to Call a Diabetes Educator or OB Provider, Healthy Eating During Pregnancy, Counting Carbohydrates, Meal Planning for GDM, and Physical Activity    Educational materials provided today:   [employer] Understanding Gestational Diabetes  GDM Log Book  Sharps Disposal  Care After Delivery    Pt verbalized understanding of concepts discussed and recommendations provided today.     PLAN:  Check glucose 4 times daily, before breakfast and 1 hour after each meal.     Check Ketones daily for one week, if negative, reduce testing to once a week.     Physical activity recommended: add some walking each day, after meals. .    Meal plan: 2-3 carbs at breakfast, 3-4 carbs at lunch, 3-4 carbs at supper, 1-2 carbs at 3 snacks a day.  Follow consistent CHO meal plan, eat CHO and protein/fat at all meals/snacks.    Call/e-mail/[employer] message diabetes educator if 3 or more blood sugars are above the goal in 1 week, if ketones are positive, or with " questions/concerns.    Anastasia Chavira RD, [facility]  Diabetes     Time Spent: 45 minutes  Encounter Type: Individual    Any diabetes medication dose changes were made via the CDE Protocol and Collaborative Practice Agreement with the patient's OB/GYN provider. A copy of this encounter was shared with the provider.

## 2022-07-08 NOTE — LETTER
2022         RE: Manuel Phillip  [address] [address]        Dear Colleague,    Thank you for referring your patient, Manuel Phillip, to the [facility]. Please see a copy of my visit note below.    Diabetes Self-Management Education & Support      SUBJECTIVE/OBJECTIVE:  Presents for education related to gestational diabetes.    Accompanied by: Self  Gestational weeks: 29w5d  Number of previous pregnancies: 2  Had any babies over 9 lbs: Yes  Previously had Gestational Diabetes: No  Have you ever had thyroid problems or taken thyroid medication?: No  Heart disease, mitral valve prolapse or rheumatic fever?: No  Hypertension : No  High Cholesterol: No  High Triglycerides: No  Do you use tobacco products?: No  Do you drink beer, wine or hard liquor?: No    Wt Readings from Last 3 Encounters:   22 124.7 kg (275 lb)   22 125.6 kg (277 lb)   22 125.2 kg (276 lb)       Cultural Influences/Ethnic Background:  Not  or     Estimated Date of Delivery: Sep 18, 2022    1 hour OGTT  Lab Results   Component Value Date    GLU1 203 (H) 2022         3 hour OGTT    Fasting  No results found for: GTTGF    1 hour  No results found for: GTTG1    2 hour  No results found for: GTTG2    3 hour  No results found for: GTTG3       Lifestyle and Health Behaviors:  Pre-pregnancy weight (lbs): 230  Exercise:: Currently not exercising  Barrier to exercise: Safety  Meal planning/habits: Avoiding sweets, Frequent snacking  Meals include: Breakfast  Breakfast: 10:30-11:00 am: Special K cereal  Lunch: rice, meat, sometimes carrots or broccoli  Dinner: bread and cheese OR leftovers  Snacks: sometimes cereal at bedtime  Beverages: Water  How many servings of fruits/vegetables per day: 1  Biggest challenges to healthy eating: Other  Pre- vitamin?: Yes  Experiencing nausea?: Yes  Experiencing heartburn?: Yes    Healthy Coping:  Emotional response to diabetes:  "Concern for health and well-being  Informal Support system:: Spouse  Stage of change: PREPARATION (Decided to change - considering how)    Current Management:  Taking medications for gestational diabetes?: No    ASSESSMENT:  Noted significantly elevated lab glucose value. Pt reports no hx of GDM, though previous babies >9#, no fam hx T2DM.  does have T2DM. Pt reports challenges \"no time\" to make meals, often has leftovers or sandwiches, does avoid sweets and sugary beverages. Discussed current food practices. Noted hx of obesity. Pt receptive to meal plan recommendations.     INTERVENTION:  Ordered Contour Next One meter and supplies. Pt's  uses this meter or monitoring his blood sugars (hx of T2DM) and will be able to assist patient.     Educational topics covered today:  GDM diagnosis, pathophysiology, Risks and Complications of GDM, Means of controlling GDM, Using a Blood Glucose Monitor, Blood Glucose Goals, Logging and Interpreting Glucose Results, Ketone Testing, When to Call a Diabetes Educator or OB Provider, Healthy Eating During Pregnancy, Counting Carbohydrates, Meal Planning for GDM, and Physical Activity    Educational materials provided today:   [employer] Understanding Gestational Diabetes  GDM Log Book  Sharps Disposal  Care After Delivery    Pt verbalized understanding of concepts discussed and recommendations provided today.     PLAN:  Check glucose 4 times daily, before breakfast and 1 hour after each meal.     Check Ketones daily for one week, if negative, reduce testing to once a week.     Physical activity recommended: add some walking each day, after meals. .    Meal plan: 2-3 carbs at breakfast, 3-4 carbs at lunch, 3-4 carbs at supper, 1-2 carbs at 3 snacks a day.  Follow consistent CHO meal plan, eat CHO and protein/fat at all meals/snacks.    Call/e-mail/[employer] message diabetes educator if 3 or more blood sugars are above the goal in 1 week, if ketones are positive, or with " questions/concerns.    Anastasia Chavira RD, [facility]  Diabetes     Time Spent: 45 minutes  Encounter Type: Individual    Any diabetes medication dose changes were made via the CDE Protocol and Collaborative Practice Agreement with the patient's OB/GYN provider. A copy of this encounter was shared with the provider.

## 2022-07-08 NOTE — PATIENT INSTRUCTIONS
"Your telephone follow-up for Diabetes Education is scheduled for Tues, July 19th at 9:45.Please contact us sooner if you have 3 or more high blood sugar readings.     1. Check blood sugar 4 times a day: when you wake up in the morning (before breakfast) and then 1 hour after the start of every meal.     2. Check urine ketones when you wake up every morning for 7 days. If negative everyday, reduce testing to once a week.    3. Follow the recommended meal plan: eat something every 2-3 hours, include protein/fat and carbohydrate at every meal and snack, have 30-45 gms of carbs at breakfast, 45-60 gms of carbs at lunch, 30-45 gms of carbs at supper, 45-60 gms carbs at 3 snacks per day. If you do not have a food label, refer to the carb \"choices/servings) in your book. One carb serving/ choice = 15 gms of carbohydrate.     4. Add activity to every day, try walking or being active after each meal to help control blood sugar levels.    5.Call or send a [employer] message to your educator if 3 or more blood sugars are above goal in 1 week, you have an elevated ketone result (trace or higher), or with questions or concerns.      Anastasia Chavira RD, LD, [facility]  Diabetes       [place] [facility] Education and Nutrition Services for the [facility]:  For Your Diabetes Education or Nutrition Appointments Call:  [phone]   For Diabetes Education and Nutrition Related Questions:   Phone: [phone]  Send [employer] Message   If you need a medication refill please contact your pharmacy. Please allow 3 business days for your refills to be completed.     "

## 2022-07-08 NOTE — PROGRESS NOTES
Diabetes Self-Management Education & Support      SUBJECTIVE/OBJECTIVE:  Presents for education related to gestational diabetes.    Accompanied by: Self  Gestational weeks: 29w5d  Number of previous pregnancies: 2  Had any babies over 9 lbs: Yes  Previously had Gestational Diabetes: No  Have you ever had thyroid problems or taken thyroid medication?: No  Heart disease, mitral valve prolapse or rheumatic fever?: No  Hypertension : No  High Cholesterol: No  High Triglycerides: No  Do you use tobacco products?: No  Do you drink beer, wine or hard liquor?: No    Wt Readings from Last 3 Encounters:   22 124.7 kg (275 lb)   22 125.6 kg (277 lb)   22 125.2 kg (276 lb)       Cultural Influences/Ethnic Background:  Not  or     Estimated Date of Delivery: Sep 18, 2022    1 hour OGTT  Lab Results   Component Value Date    GLU1 203 (H) 2022         3 hour OGTT    Fasting  No results found for: GTTGF    1 hour  No results found for: GTTG1    2 hour  No results found for: GTTG2    3 hour  No results found for: GTTG3       Lifestyle and Health Behaviors:  Pre-pregnancy weight (lbs): 230  Exercise:: Currently not exercising  Barrier to exercise: Safety  Meal planning/habits: Avoiding sweets, Frequent snacking  Meals include: Breakfast  Breakfast: 10:30-11:00 am: Special K cereal  Lunch: rice, meat, sometimes carrots or broccoli  Dinner: bread and cheese OR leftovers  Snacks: sometimes cereal at bedtime  Beverages: Water  How many servings of fruits/vegetables per day: 1  Biggest challenges to healthy eating: Other  Pre-mak vitamin?: Yes  Experiencing nausea?: Yes  Experiencing heartburn?: Yes    Healthy Coping:  Emotional response to diabetes: Concern for health and well-being  Informal Support system:: Spouse  Stage of change: PREPARATION (Decided to change - considering how)    Current Management:  Taking medications for gestational diabetes?: No    ASSESSMENT:  Noted significantly elevated  "lab glucose value. Pt reports no hx of GDM, though previous babies >9#, no fam hx T2DM.  does have T2DM. Pt reports challenges \"no time\" to make meals, often has leftovers or sandwiches, does avoid sweets and sugary beverages. Discussed current food practices. Noted hx of obesity. Pt receptive to meal plan recommendations.     INTERVENTION:  Ordered Contour Next One meter and supplies. Pt's  uses this meter or monitoring his blood sugars (hx of T2DM) and will be able to assist patient.     Educational topics covered today:  GDM diagnosis, pathophysiology, Risks and Complications of GDM, Means of controlling GDM, Using a Blood Glucose Monitor, Blood Glucose Goals, Logging and Interpreting Glucose Results, Ketone Testing, When to Call a Diabetes Educator or OB Provider, Healthy Eating During Pregnancy, Counting Carbohydrates, Meal Planning for GDM, and Physical Activity    Educational materials provided today:   [employer] Understanding Gestational Diabetes  GDM Log Book  Sharps Disposal  Care After Delivery    Pt verbalized understanding of concepts discussed and recommendations provided today.     PLAN:  Check glucose 4 times daily, before breakfast and 1 hour after each meal.     Check Ketones daily for one week, if negative, reduce testing to once a week.     Physical activity recommended: add some walking each day, after meals. .    Meal plan: 2-3 carbs at breakfast, 3-4 carbs at lunch, 3-4 carbs at supper, 1-2 carbs at 3 snacks a day.  Follow consistent CHO meal plan, eat CHO and protein/fat at all meals/snacks.    Call/e-mail/MyChart message diabetes educator if 3 or more blood sugars are above the goal in 1 week, if ketones are positive, or with questions/concerns.    Anastasia Chavira RD, [facility]  Diabetes     Time Spent: 45 minutes  Encounter Type: Individual    Any diabetes medication dose changes were made via the CDE Protocol and Collaborative Practice Agreement with the patient's " OB/GYN provider. A copy of this encounter was shared with the provider.

## 2022-07-19 ENCOUNTER — VIRTUAL VISIT (OUTPATIENT)
Dept: EDUCATION SERVICES | Facility: CLINIC | Age: 42
End: 2022-07-19
Payer: COMMERCIAL

## 2022-07-19 DIAGNOSIS — O24.419 GESTATIONAL DIABETES: Primary | ICD-10-CM

## 2022-07-19 PROCEDURE — 99207 PR NO CHARGE LOS: CPT | Performed by: DIETITIAN, REGISTERED

## 2022-07-19 NOTE — LETTER
"    7/19/2022         RE: Manuel Phillip  [address] [address] [facility] [address]        Dear Colleague,    Thank you for referring your patient, Manuel Phillip, to the [facility]. Please see a copy of my visit note below.    Called patient for GDM follow up and BG review. Pt reports does not have testing supplies as of yet,  has been sick and unable to  at the pharmacy. Pt states \"will be getting everything today and will start testing\".   Appt rescheduled for 7/29. Pt urged to contact us sooner if 3 or more high glucose readings.     Anastasia Chavira RD, [facility]  Diabetes   Time spent: 5 min  No charge visit        "

## 2022-07-19 NOTE — LETTER
"    7/19/2022         RE: Manuel Phillip  [address] [address] [facility] [address]        Dear Colleague,    Thank you for referring your patient, Manuel Phillip, to the [facility]. Please see a copy of my visit note below.    Called patient for GDM follow up and BG review. Pt reports does not have testing supplies as of yet,  has been sick and unable to  at the pharmacy. Pt states \"will be getting everything today and will start testing\".   Appt rescheduled for 7/29. Pt urged to contact us sooner if 3 or more high glucose readings.     Anastasia Chavira RD, [facility]  Diabetes   Time spent: 5 min  No charge visit          "

## 2022-07-21 NOTE — PROGRESS NOTES
"Called patient for GDM follow up and BG review. Pt reports does not have testing supplies as of yet,  has been sick and unable to  at the pharmacy. Pt states \"will be getting everything today and will start testing\".   Appt rescheduled for 7/29. Pt urged to contact us sooner if 3 or more high glucose readings.     Anastasia Chavira RD, [facility]  Diabetes   Time spent: 5 min  No charge visit    "

## 2022-07-27 ENCOUNTER — OFFICE VISIT (OUTPATIENT)
Dept: MATERNAL FETAL MEDICINE | Facility: CLINIC | Age: 42
End: 2022-07-27
Attending: OBSTETRICS & GYNECOLOGY
Payer: COMMERCIAL

## 2022-07-27 ENCOUNTER — HOSPITAL ENCOUNTER (OUTPATIENT)
Dept: ULTRASOUND IMAGING | Facility: CLINIC | Age: 42
Discharge: HOME | End: 2022-07-27
Attending: OBSTETRICS & GYNECOLOGY
Payer: COMMERCIAL

## 2022-07-27 DIAGNOSIS — O09.813 PREGNANCY RESULTING FROM IN VITRO FERTILIZATION IN THIRD TRIMESTER: Primary | ICD-10-CM

## 2022-07-27 DIAGNOSIS — O09.523 MULTIGRAVIDA OF ADVANCED MATERNAL AGE IN THIRD TRIMESTER: ICD-10-CM

## 2022-07-27 PROCEDURE — 76816 OB US FOLLOW-UP PER FETUS: CPT | Mod: 26 | Performed by: OBSTETRICS & GYNECOLOGY

## 2022-07-27 PROCEDURE — 76816 OB US FOLLOW-UP PER FETUS: CPT

## 2022-07-27 NOTE — PROGRESS NOTES
Please see full imaging report from [employer] program under imaging tab.    Viktor Adair MD  Maternal Fetal Medicine

## 2022-07-29 ENCOUNTER — VIRTUAL VISIT (OUTPATIENT)
Dept: EDUCATION SERVICES | Facility: CLINIC | Age: 42
End: 2022-07-29
Payer: COMMERCIAL

## 2022-07-29 DIAGNOSIS — O24.419 GESTATIONAL DIABETES: Primary | ICD-10-CM

## 2022-07-29 NOTE — Clinical Note
Poncho Ward, Not sure exactly how your GDM follow up process works, but I'm really am concerned about this patient. Please let me know what else we can do.   Thanks! Anastasia

## 2022-07-29 NOTE — LETTER
7/29/2022         RE: Manuel Phillip  [address] [address] [address]        Dear Colleague,    Thank you for referring your patient, Manuel Phillip, to the [facility]. Please see a copy of my visit note below.    Called patient for scheduled GDM follow up. Pt reported she forgot about her appt, and states she would call back later today. Pt has not been able to report home glucose readings as of yet (1 hr gluc screening test was 203 mg/dl on 6/23, initial GDM Ed appt was 7/19). Reminded pt it was extremely important that we discuss progress asap.     Anastasia Chavira RD, [facility]  Diabetes

## 2022-07-30 NOTE — PROGRESS NOTES
Called patient for scheduled GDM follow up. Pt reported she forgot about her appt, and states she would call back later today. Pt has not been able to report home glucose readings as of yet (1 hr gluc screening test was 203 mg/dl on 6/23, initial GDM Ed appt was 7/19). Reminded pt it was extremely important that we discuss progress asap.     Anastasia Chavira RD, [facility]  Diabetes   No charge encounter

## 2022-08-02 ENCOUNTER — PRENATAL OFFICE VISIT (OUTPATIENT)
Dept: OBGYN | Facility: CLINIC | Age: 42
End: 2022-08-02
Payer: COMMERCIAL

## 2022-08-02 VITALS — WEIGHT: 278.3 LBS | BODY MASS INDEX: 38.81 KG/M2 | SYSTOLIC BLOOD PRESSURE: 122 MMHG | DIASTOLIC BLOOD PRESSURE: 74 MMHG

## 2022-08-02 DIAGNOSIS — O24.410 WHITE CLASSIFICATION A1 GESTATIONAL DIABETES MELLITUS (GDM), DIET CONTROLLED: Primary | ICD-10-CM

## 2022-08-02 DIAGNOSIS — O99.213 OBESITY AFFECTING PREGNANCY IN THIRD TRIMESTER: ICD-10-CM

## 2022-08-02 DIAGNOSIS — O09.523 MULTIGRAVIDA OF ADVANCED MATERNAL AGE IN THIRD TRIMESTER: ICD-10-CM

## 2022-08-02 PROCEDURE — 99207 PR PRENATAL VISIT: CPT | Performed by: OBSTETRICS & GYNECOLOGY

## 2022-08-02 NOTE — PROGRESS NOTES
Manuel Phillip is a 42 year old female, 33w2d, Estimated Date of Delivery: Sep 18, 2022 who presents for prenatal care.    Good fetal movement.  Patient is doing well without concerns.      1) GDMA: not regularly testing BG as she does not have adequate supplies. Will obtain today. Expressed importance of 4 times per day testing. She has been doing fasting BGs at home with 's machine and reports 70s-80s.   - failed several echo appointments.     2) AMA:  testing weekly at 36w.     A/P:, Intrauterine pregnancy 27-4/7 weeks gestation, AMA, IVF pregnancy, declines  testing.  Mild polyhydramnios noted on level 2 ultrasound.  Patient to have a blood sugar screen done today for gestational diabetes.  Patient's recovered from COVID 2022.  No record of COVID vaccination.     Return to clinic 1w with glucose record.   Meera Chan MD

## 2022-08-02 NOTE — NURSING NOTE
"Chief Complaint   Patient presents with     Prenatal Care     33 weeks 2 days, no c/o VB, LoF, cramping/contractions. Feeling FM daily. No questions or concerns.   Had appt with Diabetes Ed - has not been able to get test strips from [facility] for various reasons. Is going to go there again today. Has used her husbands meter to check and numbers seem to be ok.        Initial /74   Wt 126.2 kg (278 lb 4.8 oz)   LMP 12/15/2021   BMI 38.81 kg/m   Estimated body mass index is 38.81 kg/m  as calculated from the following:    Height as of 22: 1.803 m (5' 11\").    Weight as of this encounter: 126.2 kg (278 lb 4.8 oz).  BP completed using cuff size: large    Questioned patient about current smoking habits.  Pt. has never smoked.          The following HM Due: NONE      Sonja Morris CMA               "

## 2022-08-11 ENCOUNTER — VIRTUAL VISIT (OUTPATIENT)
Dept: EDUCATION SERVICES | Facility: CLINIC | Age: 42
End: 2022-08-11
Payer: COMMERCIAL

## 2022-08-11 DIAGNOSIS — O24.419 GESTATIONAL DIABETES: Primary | ICD-10-CM

## 2022-08-11 PROCEDURE — G0108 DIAB MANAGE TRN  PER INDIV: HCPCS | Mod: AE | Performed by: DIETITIAN, REGISTERED

## 2022-08-11 NOTE — PATIENT INSTRUCTIONS
"Plan to share your glucose and ketone information with diabetes education once a week, unless otherwise directed.  Follow up review-call triage on Monday [phone]    1. Check glucose 4 times daily, before breakfast daily and 1 hour after each meal, or as recommended.  Blood glucose goal before breakfast: <95 mg/dL  1 hour after start of meals:  <140 mg/dL OR  2 hours after start of meal: <120mg/dL (can do this if you forget 1 hour check)     -Lancets should be placed in a sharps container or you can use a laundry container, do not throw lancets in the trash.  If using laundry container, once mostly full, can duct-tape the lid closed, label \"Sharps-do not recycle\" and then place in trash. You can call your [employer] service to find appropriate drop off sites for lancets.    2. Check ketones daily or once a week after they have been negative for 7 days in a row. If ketones are elevated, let your diabetes educator know and continue to check daily until they are negative for 7 days in a row.    3. Continue with recommended physical activity.    4. Continue to follow recommended meal plan: 30-45g carbs at breakfast, 45-60g carbs at lunch, 45-60g carbs at supper, 15-30g carbs at snacks.  Follow consistent CHO meal plan, eat CHO and protein/fat at all meals/snacks.    5. Follow-up with OB doctor as recommended.    6. Call or MyChart message your diabetes educator if 3 or more blood sugars are above the goal in 1 week or if ketones are elevated (trace or above).     After Delivery:    Check blood sugar 4 - 6 times per week to be sure that the numbers have gone back to normal after baby is born. Check blood sugar before breakfast or 2 hours after the start of a meal.     Blood sugar goals are different when you are not pregnant:    Before breakfast: Less than 100  2 hours after a meal: Less than 140    If you have elevated numbers, contact your OB/GYN or primary care provider.    2. Have a 2-hour glucose tolerance test " done at your post-partum check-up.    3. Continue with healthy eating and physical activity to get back to your pre-pregnancy weight.     4.  Have your fasting blood sugar checked once a year.    5. Plan ahead for future pregnancies - eat healthy, keep active, work with your doctor to check for gestational diabetes early on in the pregnancy and check blood sugars as recommended by your doctor.     [place] [facility] for the [facility]:  For Your Diabetes Education or Nutrition Appointments Call:  [phone]   For Diabetes Education and Nutrition Related Questions:   Phone: [phone]  Send "[employer] Message   If you need a medication refill please contact your pharmacy. Please allow 3 business days for your refills to be completed.

## 2022-08-11 NOTE — PROGRESS NOTES
Type of Service: Telephone Visit    Originating Location (Patient Location): Home  Distant Location (Provider Location): [facility]  Mode of Communication:  Telephone    Telephone Visit Start Time: 10:00  Telephone Visit End Time (telephone visit stop time): 10:50    How would patient like to obtain AVS? Clif     Diabetes Self-Management Education & Support    SUBJECTIVE/OBJECTIVE:  Presents for education related to gestational diabetes visit #2    Cultural Influences/Ethnic Background:  Not  or       LMP 12/15/2021     Wt Readings from Last 5 Encounters:   08/02/22 126.2 kg (278 lb 4.8 oz)   07/06/22 124.7 kg (275 lb)   06/23/22 125.6 kg (277 lb)   06/09/22 125.2 kg (276 lb)   05/12/22 113.4 kg (250 lb)   ]    Estimated Date of Delivery: Sep 18, 2022    Blood Glucose/Ketone Log:    Date Ketones Fasting 1st meal 2nd meal 3rd meal   8/5  127 142 148 157   8/6  133 missed 208 154   8/7  120 187 156    8/8  112  209 162   8/9  132 228 149 178   8/10  122  156 185   8/11  128      -Has not been able to  ketone strips from pharmacy yet.     *Only eats when feels hungry  -diet, drinking orange juice      Lifestyle and Health Behaviors:  Exercise: Currently not exercising  Meals include: Breakfast, Lunch, Dinner-reports she has not been following the diet at all, has not been feeling well.   Breakfast: 11-1: eggs + cheese + orange juice (3 cups)  Lunch: Pasta or rice-1 plate + meat/vegetables + 1-2 cups orange juice  Dinner: Pasta or rice-1 plate + meat/vegetables + 1-2 cups orange juice  Snacks: cereal snack (honey nut cheerios OR Special K)-with milk special K 45g cho per serving.  Other: Eating a lot of watermelon. Drinking a lot of juice d/t being sick but also really likes it.  Beverages: Water, Juice    Healthy Coping:  Emotional response to diabetes: Concern for health and well-being  Informal Support system:: Spouse  Stage of change: PREPARATION (Decided to change -  considering how)    Current Management: Diet       ASSESSMENT:  Ketones: has not started checking  Fasting blood glucoses: 0% in target.  After breakfast: 0% in target.  After lunch: 0% in target.  After dinner: 0% in target.    Manuel reports she has been sick and not following GDM diet. She has been drinking large amounts of orange juice, reports she really likes it but understands she needs to stop. We discussed alternatives. Discussed that current BG are too high and she will likely need insulin. She wants to try and adjust her diet and see if it will improve numbers before starting insulin. We reviewed risks for her and baby with ongoing hyperglycemia. Reviewed diet in great detail. She plans to call triage line on Monday with BG readings. I also sent her a link for how to use insulin with both vial and pen. Reviewed after delivery topics below. Also scheduled a f/u with her in a few weeks but stressed importance of calling triage line Monday.    INTERVENTION:  Educational topics covered today:  What to expect after delivery, Future testing for Type 2 diabetes (2 hour OGTT at 6 week post-partum check-up and annual fasting blood glucose level), Risk of GDM and planning ahead for future pregnancies, Recommended lifestyle interventions for reducing the risk of Type 2 Diabetes, When to Call a Diabetes Educator or OB Provider    Educational Materials provided today (emailed):  Understanding Gestational Diabetes Book  Food log book   BG log book    PLAN:  Plan to share your glucose and ketone information with diabetes education once a week, unless otherwise directed.  Follow up review-call triage on Monday [phone]    1. Check glucose 4 times daily, before breakfast daily and 1 hour after each meal, or as recommended.  Blood glucose goal before breakfast: <95 mg/dL  1 hour after start of meals:  <140 mg/dL OR  2 hours after start of meal: <120mg/dL (can do this if you forget 1 hour check)     -Lancets should be  "placed in a sharps container or you can use a laundry container, do not throw lancets in the trash.  If using laundry container, once mostly full, can duct-tape the lid closed, label \"Sharps-do not recycle\" and then place in trash. You can call your [employer] service to find appropriate drop off sites for lancets.    2. Check ketones daily or once a week after they have been negative for 7 days in a row. If ketones are elevated, let your diabetes educator know and continue to check daily until they are negative for 7 days in a row.    3. Continue with recommended physical activity.    4. Continue to follow recommended meal plan: 30-45g carbs at breakfast, 45-60g carbs at lunch, 45-60g carbs at supper, 15-30g carbs at snacks.  Follow consistent CHO meal plan, eat CHO and protein/fat at all meals/snacks.    5. Follow-up with OB doctor as recommended.    6. Call or MyChart message your diabetes educator if 3 or more blood sugars are above the goal in 1 week or if ketones are elevated (trace or above).     Alda Ann RD, VANE, [facility]      Time Spent: 45+ minutes  Encounter Type: Individual    Any diabetes medication dose changes were made via the CDE Protocol and Collaborative Practice Agreement with the patient's OB/GYN provider. A copy of this encounter was shared with the provider.              "

## 2022-08-11 NOTE — LETTER
8/11/2022         RE: Manuel Phillip  [address]  [place] [address]        Dear Colleague,    Thank you for referring your patient, Manuel Phillip, to the [facility]. Please see a copy of my visit note below.    Type of Service: Telephone Visit    Originating Location (Patient Location): Home  Distant Location (Provider Location): [facility]  Mode of Communication:  Telephone    Telephone Visit Start Time: 10:00  Telephone Visit End Time (telephone visit stop time): 10:50    How would patient like to obtain AVS? MyChart     Diabetes Self-Management Education & Support    SUBJECTIVE/OBJECTIVE:  Presents for education related to gestational diabetes visit #2    Cultural Influences/Ethnic Background:  Not  or       LMP 12/15/2021     Wt Readings from Last 5 Encounters:   08/02/22 126.2 kg (278 lb 4.8 oz)   07/06/22 124.7 kg (275 lb)   06/23/22 125.6 kg (277 lb)   06/09/22 125.2 kg (276 lb)   05/12/22 113.4 kg (250 lb)   ]    Estimated Date of Delivery: Sep 18, 2022    Blood Glucose/Ketone Log:    Date Ketones Fasting 1st meal 2nd meal 3rd meal   8/5  127 142 148 157   8/6  133 missed 208 154   8/7  120 187 156    8/8  112  209 162   8/9  132 228 149 178   8/10  122  156 185   8/11  128      -Has not been able to  ketone strips from pharmacy yet.     *Only eats when feels hungry  -diet, drinking orange juice      Lifestyle and Health Behaviors:  Exercise: Currently not exercising  Meals include: Breakfast, Lunch, Dinner-reports she has not been following the diet at all, has not been feeling well.   Breakfast: 11-1: eggs + cheese + orange juice (3 cups)  Lunch: Pasta or rice-1 plate + meat/vegetables + 1-2 cups orange juice  Dinner: Pasta or rice-1 plate + meat/vegetables + 1-2 cups orange juice  Snacks: cereal snack (honey nut cheerios OR Special K)-with milk special K 45g cho per serving.  Other: Eating a lot of watermelon. Drinking a  lot of juice d/t being sick but also really likes it.  Beverages: Water, Juice    Healthy Coping:  Emotional response to diabetes: Concern for health and well-being  Informal Support system:: Spouse  Stage of change: PREPARATION (Decided to change - considering how)    Current Management: Diet       ASSESSMENT:  Ketones: has not started checking  Fasting blood glucoses: 0% in target.  After breakfast: 0% in target.  After lunch: 0% in target.  After dinner: 0% in target.    Manuel reports she has been sick and not following GDM diet. She has been drinking large amounts of orange juice, reports she really likes it but understands she needs to stop. We discussed alternatives. Discussed that current BG are too high and she will likely need insulin. She wants to try and adjust her diet and see if it will improve numbers before starting insulin. We reviewed risks for her and baby with ongoing hyperglycemia. Reviewed diet in great detail. She plans to call triage line on Monday with BG readings. I also sent her a link for how to use insulin with both vial and pen. Reviewed after delivery topics below. Also scheduled a f/u with her in a few weeks but stressed importance of calling triage line Monday.    INTERVENTION:  Educational topics covered today:  What to expect after delivery, Future testing for Type 2 diabetes (2 hour OGTT at 6 week post-partum check-up and annual fasting blood glucose level), Risk of GDM and planning ahead for future pregnancies, Recommended lifestyle interventions for reducing the risk of Type 2 Diabetes, When to Call a Diabetes Educator or OB Provider    Educational Materials provided today (emailed):  Understanding Gestational Diabetes Book  Food log book   BG log book    PLAN:  Plan to share your glucose and ketone information with diabetes education once a week, unless otherwise directed.  Follow up review-call triage on Monday [phone]    1. Check glucose 4 times daily, before breakfast  "daily and 1 hour after each meal, or as recommended.  Blood glucose goal before breakfast: <95 mg/dL  1 hour after start of meals:  <140 mg/dL OR  2 hours after start of meal: <120mg/dL (can do this if you forget 1 hour check)     -Lancets should be placed in a sharps container or you can use a laundry container, do not throw lancets in the trash.  If using laundry container, once mostly full, can duct-tape the lid closed, label \"Sharps-do not recycle\" and then place in trash. You can call your [employer] service to find appropriate drop off sites for lancets.    2. Check ketones daily or once a week after they have been negative for 7 days in a row. If ketones are elevated, let your diabetes educator know and continue to check daily until they are negative for 7 days in a row.    3. Continue with recommended physical activity.    4. Continue to follow recommended meal plan: 30-45g carbs at breakfast, 45-60g carbs at lunch, 45-60g carbs at supper, 15-30g carbs at snacks.  Follow consistent CHO meal plan, eat CHO and protein/fat at all meals/snacks.    5. Follow-up with OB doctor as recommended.    6. Call or MyChart message your diabetes educator if 3 or more blood sugars are above the goal in 1 week or if ketones are elevated (trace or above).     Alda Ann RD, LD, [facility]      Time Spent: 45+ minutes  Encounter Type: Individual    Any diabetes medication dose changes were made via the CDE Protocol and Collaborative Practice Agreement with the patient's OB/GYN provider. A copy of this encounter was shared with the provider.                    "

## 2022-08-15 ENCOUNTER — TELEPHONE (OUTPATIENT)
Dept: EDUCATION SERVICES | Facility: CLINIC | Age: 42
End: 2022-08-15

## 2022-08-15 NOTE — TELEPHONE ENCOUNTER
Called Amaal to follow-up on BG. No answer. Left voicemail. Will send MyChart message as well.     KAILASH Hernandez [facility]

## 2022-08-15 NOTE — TELEPHONE ENCOUNTER
Manuel called diabetes triage line stating that Ansatasia told her to call today, but she cannot remember why. Requesting a call back.     Saadia Clinton RD [facility]

## 2022-08-19 ENCOUNTER — PRENATAL OFFICE VISIT (OUTPATIENT)
Dept: OBGYN | Facility: CLINIC | Age: 42
End: 2022-08-19
Payer: COMMERCIAL

## 2022-08-19 VITALS — BODY MASS INDEX: 40.59 KG/M2 | DIASTOLIC BLOOD PRESSURE: 72 MMHG | SYSTOLIC BLOOD PRESSURE: 122 MMHG | WEIGHT: 291 LBS

## 2022-08-19 DIAGNOSIS — O09.523 MULTIGRAVIDA OF ADVANCED MATERNAL AGE IN THIRD TRIMESTER: ICD-10-CM

## 2022-08-19 DIAGNOSIS — O24.410 WHITE CLASSIFICATION A1 GESTATIONAL DIABETES MELLITUS (GDM), DIET CONTROLLED: Primary | ICD-10-CM

## 2022-08-19 DIAGNOSIS — O09.899 SUPERVISION OF OTHER HIGH RISK PREGNANCY, ANTEPARTUM: ICD-10-CM

## 2022-08-19 PROCEDURE — 99207 PR PRENATAL VISIT: CPT | Performed by: OBSTETRICS & GYNECOLOGY

## 2022-08-19 NOTE — NURSING NOTE
"Chief Complaint   Patient presents with     Prenatal Care     35 weeks 5 days- edema in extremities        Initial /78 (BP Location: Right arm, Patient Position: Sitting, Cuff Size: Adult Large)   Wt 132 kg (291 lb)   LMP 12/15/2021   BMI 40.59 kg/m   Estimated body mass index is 40.59 kg/m  as calculated from the following:    Height as of 22: 1.803 m (5' 11\").    Weight as of this encounter: 132 kg (291 lb).  BP completed using cuff size: large    Questioned patient about current smoking habits.  Pt. has never smoked.          The following HM Due: NONE    35w5d  Lacho Luo CMA                "

## 2022-08-19 NOTE — PROGRESS NOTES
Manuel Phillip is a 42 year old female, 35w5d, Estimated Date of Delivery: Sep 18, 2022 who presents for prenatal care.    Good fetal movement.  The fasting blood sugars have ranged to 102.  Had 2 elevated 1 hour postprandials 1 of 179 and 1 of 157 the rest of all been less than 140.  The patient does have a follow-up MFM ultrasound scheduled for 2022.  We reviewed with her advanced maternal age and  testing recommendations.  A/P:    Intrauterine pregnancy 35-5/7 weeks gestation A1 gestational diabetes, IVF pregnancy, AMA, patient declined  testing.  Patient did not show for fetal echocardiography appointments.  Mild polyhydramnios noted on earlier level 2 ultrasound with mildly elevated fasting blood sugars, COVID-19 infection 2022.  Patient is recovered.  No record of COVID vaccination    Patient will have a biophysical profile and follow-up MFM ultrasound next week.  I stressed the importance of meticulous blood control.  Signs and symptoms of concern were discussed the patient will call

## 2022-08-19 NOTE — PATIENT INSTRUCTIONS
You can reach your [facility] any time of the day by calling [phone]. This number will put you in touch with the 24 hour nurse line if the clinic is closed.    To contact your OB/GYN Station Coordinator/Surgery Scheduler please call [phone]. This is a direct number for your care team between 8 a.m. and 4 p.m. Monday through Friday.    [facility] is open for your convenience:  Monday through Friday 8 a.m. to 6 p.m.  Closed weekends and all major holidays.

## 2022-08-21 ENCOUNTER — HEALTH MAINTENANCE LETTER (OUTPATIENT)
Age: 42
End: 2022-08-21

## 2022-08-24 ENCOUNTER — OFFICE VISIT (OUTPATIENT)
Dept: MATERNAL FETAL MEDICINE | Facility: CLINIC | Age: 42
End: 2022-08-24
Attending: OBSTETRICS & GYNECOLOGY
Payer: COMMERCIAL

## 2022-08-24 ENCOUNTER — HOSPITAL ENCOUNTER (OUTPATIENT)
Dept: ULTRASOUND IMAGING | Facility: CLINIC | Age: 42
Discharge: HOME | End: 2022-08-24
Attending: OBSTETRICS & GYNECOLOGY
Payer: COMMERCIAL

## 2022-08-24 DIAGNOSIS — O24.419 GESTATIONAL DIABETES MELLITUS (GDM) IN THIRD TRIMESTER, GESTATIONAL DIABETES METHOD OF CONTROL UNSPECIFIED: ICD-10-CM

## 2022-08-24 DIAGNOSIS — O24.410 DIET CONTROLLED GESTATIONAL DIABETES MELLITUS (GDM) IN THIRD TRIMESTER: ICD-10-CM

## 2022-08-24 DIAGNOSIS — O09.813 PREGNANCY RESULTING FROM IN VITRO FERTILIZATION IN THIRD TRIMESTER: ICD-10-CM

## 2022-08-24 DIAGNOSIS — O09.523 MULTIGRAVIDA OF ADVANCED MATERNAL AGE IN THIRD TRIMESTER: Primary | ICD-10-CM

## 2022-08-24 PROCEDURE — 76816 OB US FOLLOW-UP PER FETUS: CPT

## 2022-08-24 PROCEDURE — 76819 FETAL BIOPHYS PROFIL W/O NST: CPT | Mod: 26 | Performed by: OBSTETRICS & GYNECOLOGY

## 2022-08-24 PROCEDURE — 76816 OB US FOLLOW-UP PER FETUS: CPT | Mod: 26 | Performed by: OBSTETRICS & GYNECOLOGY

## 2022-08-24 NOTE — PROGRESS NOTES
"Please see \"Imaging\" tab under \"Chart Review\" for details of today's US at the [facility].    Thang Reyes MD  Maternal-Fetal Medicine    "

## 2022-08-25 ENCOUNTER — PRENATAL OFFICE VISIT (OUTPATIENT)
Dept: OBGYN | Facility: CLINIC | Age: 42
End: 2022-08-25
Payer: COMMERCIAL

## 2022-08-25 VITALS — WEIGHT: 290.2 LBS | BODY MASS INDEX: 40.47 KG/M2 | DIASTOLIC BLOOD PRESSURE: 74 MMHG | SYSTOLIC BLOOD PRESSURE: 120 MMHG

## 2022-08-25 DIAGNOSIS — O09.899 SUPERVISION OF OTHER HIGH RISK PREGNANCY, ANTEPARTUM: Primary | ICD-10-CM

## 2022-08-25 PROCEDURE — 87653 STREP B DNA AMP PROBE: CPT | Performed by: OBSTETRICS & GYNECOLOGY

## 2022-08-25 PROCEDURE — 99207 PR PRENATAL VISIT: CPT | Performed by: OBSTETRICS & GYNECOLOGY

## 2022-08-25 RX ORDER — BLOOD-GLUCOSE METER
EACH MISCELLANEOUS
COMMUNITY
Start: 2022-08-02 | End: 2024-07-16

## 2022-08-25 NOTE — NURSING NOTE
"Chief Complaint   Patient presents with     Prenatal Care     36 weeks 4 days   GBS Due   GDM   MFM 2022          Initial /74 (BP Location: Right arm, Cuff Size: Adult Large)   Wt 131.6 kg (290 lb 3.2 oz)   LMP 12/15/2021   Breastfeeding No   BMI 40.47 kg/m   Estimated body mass index is 40.47 kg/m  as calculated from the following:    Height as of 22: 1.803 m (5' 11\").    Weight as of this encounter: 131.6 kg (290 lb 3.2 oz).  BP completed using cuff size: X-large    Questioned patient about current smoking habits.  Pt. has never smoked.    36w4d      The following HM Due: NONE      +FM daily   +MFM 2022  +GBS due today         Missy Frey, CMA on 2022 at 3:24 PM             "

## 2022-08-25 NOTE — PROGRESS NOTES
Manuel Phillip is a 42 year old female, 36w4d, Estimated Date of Delivery: Sep 18, 2022 who presents for prenatal care.    Good fetal movement.  Patient is doing well without concerns.      1) GDMA: now testing 3 times daily. Elevated fasting levels in the  range, PP values all normal. Frequently eating just 2 meals daily, walking twice daily. Recommended 3 meals daily. Meets with diabetes Ed tomorrow, will discuss management.    - failed several echo appointments.    - twice weekly BPPs with MFM   - plan delivery at 39w. Deferred cervical exam until 38w    2) AMA:  testing      GBS collected today and reviewed.   Return to clinic 1w with glucose record.   Meera Chan MD

## 2022-08-27 LAB — GP B STREP DNA SPEC QL NAA+PROBE: NEGATIVE

## 2022-09-01 ENCOUNTER — PRENATAL OFFICE VISIT (OUTPATIENT)
Dept: OBGYN | Facility: CLINIC | Age: 42
End: 2022-09-01
Payer: COMMERCIAL

## 2022-09-01 VITALS — BODY MASS INDEX: 40.08 KG/M2 | SYSTOLIC BLOOD PRESSURE: 124 MMHG | WEIGHT: 287.4 LBS | DIASTOLIC BLOOD PRESSURE: 70 MMHG

## 2022-09-01 DIAGNOSIS — O09.529 HIGH-RISK PREGNANCY, ELDERLY MULTIGRAVIDA, UNSPECIFIED TRIMESTER: ICD-10-CM

## 2022-09-01 DIAGNOSIS — O24.410 WHITE CLASSIFICATION A1 GESTATIONAL DIABETES MELLITUS (GDM), DIET CONTROLLED: Primary | ICD-10-CM

## 2022-09-01 PROCEDURE — 99207 PR PRENATAL VISIT: CPT | Performed by: OBSTETRICS & GYNECOLOGY

## 2022-09-01 NOTE — NURSING NOTE
"Chief Complaint   Patient presents with     Prenatal Care     37 weeks 4 days, no c/o VB, LoF, cramping/contractions. Feeling FM daily. Declines cervix check today  Patient reports blood sugars have been stable.        Initial /70   Wt 130.4 kg (287 lb 6.4 oz)   LMP 12/15/2021   BMI 40.08 kg/m   Estimated body mass index is 40.08 kg/m  as calculated from the following:    Height as of 22: 1.803 m (5' 11\").    Weight as of this encounter: 130.4 kg (287 lb 6.4 oz).  BP completed using cuff size: large    Questioned patient about current smoking habits.  Pt. has never smoked.          The following HM Due: NONE      Sonja Morris CMA               "

## 2022-09-01 NOTE — PROGRESS NOTES
Manuel Phillip is a 42 year old female, 37w4d, Estimated Date of Delivery: Sep 18, 2022 who presents for prenatal care.    Good fetal movement.  Patient is doing well without concerns.      1) GDMA: now testing 3 times daily. Elevated fasting levels in the 100-108 range, PP values all normal. still eating just 2 meals daily, walking twice daily. Recommended 3 meals daily. Has failed all meetings with diabetes ed. I suspect DM2 based on consistently elevated fasting values. Plan delivery 39w. Encouraged DME follow up.   - failed several echo appointments.    - twice weekly BPPs with MFM, agrees to go this week.   - plan delivery at 39w. Deferred cervical exam until 38w per patient choice.    2) AMA:  testing. Delivery 39w.      Return to clinic 1w with glucose record.   Meera Chan MD

## 2022-09-02 ENCOUNTER — OFFICE VISIT (OUTPATIENT)
Dept: MATERNAL FETAL MEDICINE | Facility: CLINIC | Age: 42
End: 2022-09-02
Attending: OBSTETRICS & GYNECOLOGY
Payer: COMMERCIAL

## 2022-09-02 ENCOUNTER — HOSPITAL ENCOUNTER (OUTPATIENT)
Dept: ULTRASOUND IMAGING | Facility: CLINIC | Age: 42
Discharge: HOME | End: 2022-09-02
Attending: OBSTETRICS & GYNECOLOGY
Payer: COMMERCIAL

## 2022-09-02 DIAGNOSIS — O09.523 MULTIGRAVIDA OF ADVANCED MATERNAL AGE IN THIRD TRIMESTER: ICD-10-CM

## 2022-09-02 DIAGNOSIS — O24.419 GESTATIONAL DIABETES MELLITUS (GDM) IN THIRD TRIMESTER, GESTATIONAL DIABETES METHOD OF CONTROL UNSPECIFIED: ICD-10-CM

## 2022-09-02 DIAGNOSIS — O09.523 MULTIGRAVIDA OF ADVANCED MATERNAL AGE IN THIRD TRIMESTER: Primary | ICD-10-CM

## 2022-09-02 PROCEDURE — 76819 FETAL BIOPHYS PROFIL W/O NST: CPT

## 2022-09-02 PROCEDURE — 76819 FETAL BIOPHYS PROFIL W/O NST: CPT | Mod: 26 | Performed by: OBSTETRICS & GYNECOLOGY

## 2022-09-06 ENCOUNTER — OFFICE VISIT (OUTPATIENT)
Dept: MATERNAL FETAL MEDICINE | Facility: CLINIC | Age: 42
End: 2022-09-06
Attending: OBSTETRICS & GYNECOLOGY
Payer: COMMERCIAL

## 2022-09-06 ENCOUNTER — HOSPITAL ENCOUNTER (OUTPATIENT)
Dept: ULTRASOUND IMAGING | Facility: CLINIC | Age: 42
Discharge: HOME | End: 2022-09-06
Attending: OBSTETRICS & GYNECOLOGY
Payer: COMMERCIAL

## 2022-09-06 DIAGNOSIS — O09.523 MULTIGRAVIDA OF ADVANCED MATERNAL AGE IN THIRD TRIMESTER: ICD-10-CM

## 2022-09-06 DIAGNOSIS — O09.523 MULTIGRAVIDA OF ADVANCED MATERNAL AGE IN THIRD TRIMESTER: Primary | ICD-10-CM

## 2022-09-06 DIAGNOSIS — O24.419 GESTATIONAL DIABETES MELLITUS (GDM) IN THIRD TRIMESTER, GESTATIONAL DIABETES METHOD OF CONTROL UNSPECIFIED: ICD-10-CM

## 2022-09-06 DIAGNOSIS — O09.813 PREGNANCY RESULTING FROM IN VITRO FERTILIZATION IN THIRD TRIMESTER: ICD-10-CM

## 2022-09-06 PROCEDURE — 76819 FETAL BIOPHYS PROFIL W/O NST: CPT | Mod: 26 | Performed by: OBSTETRICS & GYNECOLOGY

## 2022-09-06 PROCEDURE — 76819 FETAL BIOPHYS PROFIL W/O NST: CPT

## 2022-09-06 NOTE — PROGRESS NOTES
The patient was seen for an ultrasound in the Maternal-Fetal Medicine Center at the [facility] today.  For a detailed report of the ultrasound examination, please see the ultrasound report which can be found under the imaging tab.    Yulia Adams MD  , OB/GYN  Maternal-Fetal Medicine  [phone] (Pager)

## 2022-09-08 ENCOUNTER — HOSPITAL ENCOUNTER (OUTPATIENT)
Dept: ULTRASOUND IMAGING | Facility: CLINIC | Age: 42
Discharge: HOME | End: 2022-09-08
Attending: OBSTETRICS & GYNECOLOGY
Payer: COMMERCIAL

## 2022-09-08 ENCOUNTER — OFFICE VISIT (OUTPATIENT)
Dept: MATERNAL FETAL MEDICINE | Facility: CLINIC | Age: 42
End: 2022-09-08
Attending: OBSTETRICS & GYNECOLOGY
Payer: COMMERCIAL

## 2022-09-08 ENCOUNTER — PRENATAL OFFICE VISIT (OUTPATIENT)
Dept: OBGYN | Facility: CLINIC | Age: 42
End: 2022-09-08
Payer: COMMERCIAL

## 2022-09-08 VITALS — BODY MASS INDEX: 40.03 KG/M2 | WEIGHT: 287 LBS | SYSTOLIC BLOOD PRESSURE: 126 MMHG | DIASTOLIC BLOOD PRESSURE: 80 MMHG

## 2022-09-08 DIAGNOSIS — O99.213 OBESITY AFFECTING PREGNANCY IN THIRD TRIMESTER: ICD-10-CM

## 2022-09-08 DIAGNOSIS — O09.529 HIGH-RISK PREGNANCY, ELDERLY MULTIGRAVIDA, UNSPECIFIED TRIMESTER: ICD-10-CM

## 2022-09-08 DIAGNOSIS — O24.419 GESTATIONAL DIABETES MELLITUS (GDM) IN THIRD TRIMESTER, GESTATIONAL DIABETES METHOD OF CONTROL UNSPECIFIED: ICD-10-CM

## 2022-09-08 DIAGNOSIS — O09.523 MULTIGRAVIDA OF ADVANCED MATERNAL AGE IN THIRD TRIMESTER: Primary | ICD-10-CM

## 2022-09-08 DIAGNOSIS — O09.523 MULTIGRAVIDA OF ADVANCED MATERNAL AGE IN THIRD TRIMESTER: ICD-10-CM

## 2022-09-08 DIAGNOSIS — O24.410 WHITE CLASSIFICATION A1 GESTATIONAL DIABETES MELLITUS (GDM), DIET CONTROLLED: Primary | ICD-10-CM

## 2022-09-08 DIAGNOSIS — O09.813 PREGNANCY RESULTING FROM IN VITRO FERTILIZATION IN THIRD TRIMESTER: ICD-10-CM

## 2022-09-08 LAB — SARS-COV-2 RNA RESP QL NAA+PROBE: NEGATIVE

## 2022-09-08 PROCEDURE — U0005 INFEC AGEN DETEC AMPLI PROBE: HCPCS | Performed by: OBSTETRICS & GYNECOLOGY

## 2022-09-08 PROCEDURE — 76819 FETAL BIOPHYS PROFIL W/O NST: CPT | Mod: 26 | Performed by: OBSTETRICS & GYNECOLOGY

## 2022-09-08 PROCEDURE — 99207 PR PRENATAL VISIT: CPT | Performed by: OBSTETRICS & GYNECOLOGY

## 2022-09-08 PROCEDURE — 76819 FETAL BIOPHYS PROFIL W/O NST: CPT

## 2022-09-08 PROCEDURE — U0003 INFECTIOUS AGENT DETECTION BY NUCLEIC ACID (DNA OR RNA); SEVERE ACUTE RESPIRATORY SYNDROME CORONAVIRUS 2 (SARS-COV-2) (CORONAVIRUS DISEASE [COVID-19]), AMPLIFIED PROBE TECHNIQUE, MAKING USE OF HIGH THROUGHPUT TECHNOLOGIES AS DESCRIBED BY CMS-2020-01-R: HCPCS | Performed by: OBSTETRICS & GYNECOLOGY

## 2022-09-08 NOTE — PROGRESS NOTES
Induction smart phrase   Procedure: Induction  Date: 9/11/22  Time: 730am  Hospital: [facility]  Orders faxed and the patient was advised to call [facility] [phone] labor and delivery department one hour prior to arrival.  Manasa Woods LPN

## 2022-09-08 NOTE — PROGRESS NOTES
Manuel Phillip is a 42 year old female, 38w4d, Estimated Date of Delivery: Sep 18, 2022 who presents for prenatal care.    Good fetal movement.  Patient is doing well without concerns.      1) GDMA: now testing 3 times daily. Elevated fasting levels in the 100-135 range, PP values intermittently elevated. She has had just 1 meeting with diabetes ed and has failed all other follow up. Inconsistent compliance with diabetic diet. Recommend delivery at 39w.    - failed several echo appointments.    - twice weekly BPPs with MFM.   - cervix is favorable.    2) AMA:  testing. Delivery 39w.      IOL scheduled early next week.    Meera Chan MD

## 2022-09-08 NOTE — PROGRESS NOTES
"Please see \"Imaging\" tab under \"Chart Review\" for details of today's US at the [facility].    Thang Reyes MD  Maternal-Fetal Medicine    "

## 2022-09-08 NOTE — NURSING NOTE
"Chief Complaint   Patient presents with     Prenatal Care       Initial /80   Wt 130.2 kg (287 lb)   LMP 12/15/2021   Breastfeeding No   BMI 40.03 kg/m   Estimated body mass index is 40.03 kg/m  as calculated from the following:    Height as of 22: 1.803 m (5' 11\").    Weight as of this encounter: 130.2 kg (287 lb).  BP completed using cuff size: large    Questioned patient about current smoking habits.  Pt. has never smoked.          38w4d  + Fetal movement daily  - bleeding or leaking of fluid  - headache   + edema   + heartburn at HS  + rochelle moore contractions  Manasa Woods LPN               "

## 2022-09-11 ENCOUNTER — ANESTHESIA EVENT (OUTPATIENT)
Dept: OBGYN | Facility: CLINIC | Age: 42
End: 2022-09-11
Payer: COMMERCIAL

## 2022-09-11 ENCOUNTER — HOSPITAL ENCOUNTER (INPATIENT)
Facility: CLINIC | Age: 42
LOS: 3 days | Discharge: HOME HEALTH | End: 2022-09-14
Attending: OBSTETRICS & GYNECOLOGY | Admitting: OBSTETRICS & GYNECOLOGY
Payer: COMMERCIAL

## 2022-09-11 ENCOUNTER — ANESTHESIA (OUTPATIENT)
Dept: OBGYN | Facility: CLINIC | Age: 42
End: 2022-09-11
Payer: COMMERCIAL

## 2022-09-11 DIAGNOSIS — O09.899 SUPERVISION OF OTHER HIGH RISK PREGNANCY, ANTEPARTUM: ICD-10-CM

## 2022-09-11 LAB
ABO/RH(D): NORMAL
ANTIBODY SCREEN: NEGATIVE
GLUCOSE BLDC GLUCOMTR-MCNC: 112 MG/DL (ref 70–99)
GLUCOSE BLDC GLUCOMTR-MCNC: 77 MG/DL (ref 70–99)
GLUCOSE BLDC GLUCOMTR-MCNC: 86 MG/DL (ref 70–99)
GLUCOSE BLDC GLUCOMTR-MCNC: 90 MG/DL (ref 70–99)
HGB BLD-MCNC: 10.4 G/DL (ref 11.7–15.7)
SPECIMEN EXPIRATION DATE: NORMAL

## 2022-09-11 PROCEDURE — 86850 RBC ANTIBODY SCREEN: CPT | Performed by: OBSTETRICS & GYNECOLOGY

## 2022-09-11 PROCEDURE — 250N000009 HC RX 250: Performed by: OBSTETRICS & GYNECOLOGY

## 2022-09-11 PROCEDURE — 85018 HEMOGLOBIN: CPT | Performed by: OBSTETRICS & GYNECOLOGY

## 2022-09-11 PROCEDURE — 99231 SBSQ HOSP IP/OBS SF/LOW 25: CPT | Performed by: OBSTETRICS & GYNECOLOGY

## 2022-09-11 PROCEDURE — 86780 TREPONEMA PALLIDUM: CPT | Performed by: OBSTETRICS & GYNECOLOGY

## 2022-09-11 PROCEDURE — 370N000003 HC ANESTHESIA WARD SERVICE

## 2022-09-11 PROCEDURE — 120N000001 HC R&B MED SURG/OB

## 2022-09-11 PROCEDURE — 258N000003 HC RX IP 258 OP 636: Performed by: OBSTETRICS & GYNECOLOGY

## 2022-09-11 PROCEDURE — 86901 BLOOD TYPING SEROLOGIC RH(D): CPT | Performed by: OBSTETRICS & GYNECOLOGY

## 2022-09-11 RX ORDER — METOCLOPRAMIDE 10 MG/1
10 TABLET ORAL EVERY 6 HOURS PRN
Status: DISCONTINUED | OUTPATIENT
Start: 2022-09-11 | End: 2022-09-12 | Stop reason: HOSPADM

## 2022-09-11 RX ORDER — TRANEXAMIC ACID 10 MG/ML
1 INJECTION, SOLUTION INTRAVENOUS EVERY 30 MIN PRN
Status: DISCONTINUED | OUTPATIENT
Start: 2022-09-11 | End: 2022-09-12 | Stop reason: HOSPADM

## 2022-09-11 RX ORDER — NALBUPHINE HYDROCHLORIDE 10 MG/ML
2.5-5 INJECTION, SOLUTION INTRAMUSCULAR; INTRAVENOUS; SUBCUTANEOUS EVERY 6 HOURS PRN
Status: DISCONTINUED | OUTPATIENT
Start: 2022-09-11 | End: 2022-09-14 | Stop reason: HOSPADM

## 2022-09-11 RX ORDER — LIDOCAINE 40 MG/G
CREAM TOPICAL
Status: DISCONTINUED | OUTPATIENT
Start: 2022-09-11 | End: 2022-09-12 | Stop reason: HOSPADM

## 2022-09-11 RX ORDER — NALOXONE HYDROCHLORIDE 0.4 MG/ML
0.4 INJECTION, SOLUTION INTRAMUSCULAR; INTRAVENOUS; SUBCUTANEOUS
Status: DISCONTINUED | OUTPATIENT
Start: 2022-09-11 | End: 2022-09-12 | Stop reason: HOSPADM

## 2022-09-11 RX ORDER — SODIUM CHLORIDE 9 MG/ML
INJECTION, SOLUTION INTRAVENOUS CONTINUOUS
Status: DISCONTINUED | OUTPATIENT
Start: 2022-09-11 | End: 2022-09-12 | Stop reason: HOSPADM

## 2022-09-11 RX ORDER — IBUPROFEN 800 MG/1
800 TABLET, FILM COATED ORAL
Status: DISCONTINUED | OUTPATIENT
Start: 2022-09-11 | End: 2022-09-12

## 2022-09-11 RX ORDER — KETOROLAC TROMETHAMINE 30 MG/ML
30 INJECTION, SOLUTION INTRAMUSCULAR; INTRAVENOUS
Status: DISCONTINUED | OUTPATIENT
Start: 2022-09-11 | End: 2022-09-12

## 2022-09-11 RX ORDER — CARBOPROST TROMETHAMINE 250 UG/ML
250 INJECTION, SOLUTION INTRAMUSCULAR
Status: DISCONTINUED | OUTPATIENT
Start: 2022-09-11 | End: 2022-09-12 | Stop reason: HOSPADM

## 2022-09-11 RX ORDER — OXYTOCIN/0.9 % SODIUM CHLORIDE 30/500 ML
1-24 PLASTIC BAG, INJECTION (ML) INTRAVENOUS CONTINUOUS
Status: DISCONTINUED | OUTPATIENT
Start: 2022-09-11 | End: 2022-09-12 | Stop reason: HOSPADM

## 2022-09-11 RX ORDER — OXYTOCIN 10 [USP'U]/ML
10 INJECTION, SOLUTION INTRAMUSCULAR; INTRAVENOUS
Status: DISCONTINUED | OUTPATIENT
Start: 2022-09-11 | End: 2022-09-14 | Stop reason: HOSPADM

## 2022-09-11 RX ORDER — CITRIC ACID/SODIUM CITRATE 334-500MG
30 SOLUTION, ORAL ORAL
Status: DISCONTINUED | OUTPATIENT
Start: 2022-09-11 | End: 2022-09-12 | Stop reason: HOSPADM

## 2022-09-11 RX ORDER — OXYTOCIN 10 [USP'U]/ML
10 INJECTION, SOLUTION INTRAMUSCULAR; INTRAVENOUS
Status: DISCONTINUED | OUTPATIENT
Start: 2022-09-11 | End: 2022-09-12 | Stop reason: HOSPADM

## 2022-09-11 RX ORDER — NALOXONE HYDROCHLORIDE 0.4 MG/ML
0.2 INJECTION, SOLUTION INTRAMUSCULAR; INTRAVENOUS; SUBCUTANEOUS
Status: DISCONTINUED | OUTPATIENT
Start: 2022-09-11 | End: 2022-09-12 | Stop reason: HOSPADM

## 2022-09-11 RX ORDER — SODIUM CHLORIDE, SODIUM LACTATE, POTASSIUM CHLORIDE, CALCIUM CHLORIDE 600; 310; 30; 20 MG/100ML; MG/100ML; MG/100ML; MG/100ML
INJECTION, SOLUTION INTRAVENOUS CONTINUOUS
Status: DISCONTINUED | OUTPATIENT
Start: 2022-09-11 | End: 2022-09-12 | Stop reason: HOSPADM

## 2022-09-11 RX ORDER — OXYTOCIN/0.9 % SODIUM CHLORIDE 30/500 ML
340 PLASTIC BAG, INJECTION (ML) INTRAVENOUS CONTINUOUS PRN
Status: DISCONTINUED | OUTPATIENT
Start: 2022-09-11 | End: 2022-09-12 | Stop reason: HOSPADM

## 2022-09-11 RX ORDER — MISOPROSTOL 200 UG/1
800 TABLET ORAL
Status: DISCONTINUED | OUTPATIENT
Start: 2022-09-11 | End: 2022-09-12 | Stop reason: HOSPADM

## 2022-09-11 RX ORDER — NICOTINE POLACRILEX 4 MG
15-30 LOZENGE BUCCAL
Status: DISCONTINUED | OUTPATIENT
Start: 2022-09-11 | End: 2022-09-12 | Stop reason: HOSPADM

## 2022-09-11 RX ORDER — PROCHLORPERAZINE 25 MG
25 SUPPOSITORY, RECTAL RECTAL EVERY 12 HOURS PRN
Status: DISCONTINUED | OUTPATIENT
Start: 2022-09-11 | End: 2022-09-12 | Stop reason: HOSPADM

## 2022-09-11 RX ORDER — HYDROXYZINE HYDROCHLORIDE 50 MG/1
100 TABLET, FILM COATED ORAL
Status: DISCONTINUED | OUTPATIENT
Start: 2022-09-11 | End: 2022-09-12 | Stop reason: HOSPADM

## 2022-09-11 RX ORDER — EPHEDRINE SULFATE 50 MG/ML
5 INJECTION, SOLUTION INTRAMUSCULAR; INTRAVENOUS; SUBCUTANEOUS
Status: DISCONTINUED | OUTPATIENT
Start: 2022-09-11 | End: 2022-09-12 | Stop reason: HOSPADM

## 2022-09-11 RX ORDER — ONDANSETRON 4 MG/1
4 TABLET, ORALLY DISINTEGRATING ORAL EVERY 6 HOURS PRN
Status: DISCONTINUED | OUTPATIENT
Start: 2022-09-11 | End: 2022-09-12 | Stop reason: HOSPADM

## 2022-09-11 RX ORDER — SODIUM CHLORIDE, SODIUM LACTATE, POTASSIUM CHLORIDE, CALCIUM CHLORIDE 600; 310; 30; 20 MG/100ML; MG/100ML; MG/100ML; MG/100ML
INJECTION, SOLUTION INTRAVENOUS CONTINUOUS PRN
Status: DISCONTINUED | OUTPATIENT
Start: 2022-09-11 | End: 2022-09-12 | Stop reason: HOSPADM

## 2022-09-11 RX ORDER — PROCHLORPERAZINE MALEATE 10 MG
10 TABLET ORAL EVERY 6 HOURS PRN
Status: DISCONTINUED | OUTPATIENT
Start: 2022-09-11 | End: 2022-09-12 | Stop reason: HOSPADM

## 2022-09-11 RX ORDER — MISOPROSTOL 200 UG/1
400 TABLET ORAL
Status: DISCONTINUED | OUTPATIENT
Start: 2022-09-11 | End: 2022-09-12 | Stop reason: HOSPADM

## 2022-09-11 RX ORDER — OXYTOCIN/0.9 % SODIUM CHLORIDE 30/500 ML
100-340 PLASTIC BAG, INJECTION (ML) INTRAVENOUS CONTINUOUS PRN
Status: DISCONTINUED | OUTPATIENT
Start: 2022-09-11 | End: 2022-09-14 | Stop reason: HOSPADM

## 2022-09-11 RX ORDER — DEXTROSE, SODIUM CHLORIDE, SODIUM LACTATE, POTASSIUM CHLORIDE, AND CALCIUM CHLORIDE 5; .6; .31; .03; .02 G/100ML; G/100ML; G/100ML; G/100ML; G/100ML
INJECTION, SOLUTION INTRAVENOUS CONTINUOUS
Status: DISCONTINUED | OUTPATIENT
Start: 2022-09-11 | End: 2022-09-12 | Stop reason: HOSPADM

## 2022-09-11 RX ORDER — METHYLERGONOVINE MALEATE 0.2 MG/ML
200 INJECTION INTRAVENOUS
Status: DISCONTINUED | OUTPATIENT
Start: 2022-09-11 | End: 2022-09-12 | Stop reason: HOSPADM

## 2022-09-11 RX ORDER — FENTANYL CITRATE 50 UG/ML
100 INJECTION, SOLUTION INTRAMUSCULAR; INTRAVENOUS
Status: DISCONTINUED | OUTPATIENT
Start: 2022-09-11 | End: 2022-09-12 | Stop reason: HOSPADM

## 2022-09-11 RX ORDER — METOCLOPRAMIDE HYDROCHLORIDE 5 MG/ML
10 INJECTION INTRAMUSCULAR; INTRAVENOUS EVERY 6 HOURS PRN
Status: DISCONTINUED | OUTPATIENT
Start: 2022-09-11 | End: 2022-09-12 | Stop reason: HOSPADM

## 2022-09-11 RX ORDER — MISOPROSTOL 100 UG/1
25 TABLET ORAL
Status: DISCONTINUED | OUTPATIENT
Start: 2022-09-11 | End: 2022-09-12 | Stop reason: HOSPADM

## 2022-09-11 RX ORDER — ONDANSETRON 2 MG/ML
4 INJECTION INTRAMUSCULAR; INTRAVENOUS EVERY 6 HOURS PRN
Status: DISCONTINUED | OUTPATIENT
Start: 2022-09-11 | End: 2022-09-12 | Stop reason: HOSPADM

## 2022-09-11 RX ORDER — DEXTROSE MONOHYDRATE 25 G/50ML
25-50 INJECTION, SOLUTION INTRAVENOUS
Status: DISCONTINUED | OUTPATIENT
Start: 2022-09-11 | End: 2022-09-12 | Stop reason: HOSPADM

## 2022-09-11 RX ADMIN — Medication 2 MILLI-UNITS/MIN: at 10:04

## 2022-09-11 RX ADMIN — SODIUM CHLORIDE, POTASSIUM CHLORIDE, SODIUM LACTATE AND CALCIUM CHLORIDE: 600; 310; 30; 20 INJECTION, SOLUTION INTRAVENOUS at 22:48

## 2022-09-11 RX ADMIN — SODIUM CHLORIDE, POTASSIUM CHLORIDE, SODIUM LACTATE AND CALCIUM CHLORIDE 125 ML/HR: 600; 310; 30; 20 INJECTION, SOLUTION INTRAVENOUS at 10:03

## 2022-09-11 ASSESSMENT — ACTIVITIES OF DAILY LIVING (ADL)
WALKING_OR_CLIMBING_STAIRS_DIFFICULTY: NO
FALL_HISTORY_WITHIN_LAST_SIX_MONTHS: NO
CHANGE_IN_FUNCTIONAL_STATUS_SINCE_ONSET_OF_CURRENT_ILLNESS/INJURY: NO
TOILETING_ISSUES: NO
ADLS_ACUITY_SCORE: 18
CONCENTRATING,_REMEMBERING_OR_MAKING_DECISIONS_DIFFICULTY: NO
WEAR_GLASSES_OR_BLIND: NO
ADLS_ACUITY_SCORE: 18
DIFFICULTY_COMMUNICATING: NO
HEARING_DIFFICULTY_OR_DEAF: NO
ADLS_ACUITY_SCORE: 18
ADLS_ACUITY_SCORE: 18
DOING_ERRANDS_INDEPENDENTLY_DIFFICULTY: NO
ADLS_ACUITY_SCORE: 18
DIFFICULTY_EATING/SWALLOWING: NO
ADLS_ACUITY_SCORE: 18
DRESSING/BATHING_DIFFICULTY: NO

## 2022-09-11 NOTE — H&P
[facility] Labor and Delivery History and Physical    Manuel Phillip MRN# [id number]   Age: 42 year old YOB: 1980     Date of Admission:  2022    Primary care provider: No Ref-Primary, Physician           Chief Complaint:   Manuel Phillip is a 42 year old [place]  female  Estimated Date of Delivery: Sep 18, 2022 IVF pregnancy done in [place] who is 39w0d pregnant and being admitted for induction of labor, indication maternal AMA and A1 GDM and maternal BMI 40.  Her pregnancy has been complicated also with Inconsistent compliance with diabetic diet,  failed several echo appointments with a  Recommendation for  delivery at 39w. Risks, benefits, and alternative modes of therapy discussed at length. Pathophysiology of the disease process reviewed, all of the patients questions answered and informed consent obtained.                -          Pregnancy history:     OBSTETRIC HISTORY:    OB History    Para Term  AB Living   3 2 2 0 0 2   SAB IAB Ectopic Multiple Live Births   0 0 0 0 2      # Outcome Date GA Lbr Zack/2nd Weight Sex Delivery Anes PTL Lv   3 Current            2 Term 05 40w0d  3.317 kg (7 lb 5 oz) F    ANNETTA   1 Term 03 41w0d 24:00 4.082 kg (9 lb) M    ANNETTA      Birth Comments: no comp       EDC: Estimated Date of Delivery: 22    Prenatal Labs:   Lab Results   Component Value Date    AS Negative 2022    HEPBANG Nonreactive 2022    CHPCRT Negative 2022    GCPCRT Negative 2022    RUBELLAABIGG 60 2004    HGB 11.3 (L) 2022    HIV Negative 2004       GBS Status:   No results found for: GBS    Active Problem List  Patient Active Problem List   Diagnosis     Infertility, female, secondary     CARDIOVASCULAR SCREENING; LDL GOAL LESS THAN 160     Vitamin D deficiency disease     Asthma     Pregnancy resulting from in vitro fertilization in third trimester     Obesity affecting pregnancy in third  trimester     Multigravida of advanced maternal age in third trimester     White classification A1 gestational diabetes mellitus (GDM), diet controlled     COVID-19 affecting pregnancy in second trimester     Supervision of other high risk pregnancy, antepartum     Indication for care in labor or delivery       Medication Prior to Admission  Medications Prior to Admission   Medication Sig Dispense Refill Last Dose     albuterol (PROAIR HFA/PROVENTIL HFA/VENTOLIN HFA) 108 (90 Base) MCG/ACT inhaler Inhale 2 puffs into the lungs every 6 hours as needed for shortness of breath / dyspnea or wheezing 18 g 0 More than a month at Unknown time     aspirin (ASA) 81 MG chewable tablet Take 81 mg by mouth daily   9/10/2022 at Unknown time     doxylamine (UNISOM) 25 MG TABS tablet Take 1 tablet (25 mg) by mouth At Bedtime 30 tablet 1 9/10/2022 at Unknown time     acetone urine (KETOSTIX) test strip 1 strip daily Test first urine of every morning for 1 week. If consistently negative, test once weekly. (Patient not taking: Reported on 9/1/2022) 50 strip 1      Blood Glucose Monitoring Suppl (CONTOUR NEXT ONE) KIT USE TO TEST BLOOD SUGAR AS DIRECTED        CONTOUR NEXT TEST test strip Use to test blood sugar 4 times daily. 150 strip 4      Microlet Lancets MISC 100 each 4 times daily Use to test blood sugar 4 daily. 200 each 4      Prenatal Vit-Fe Fumarate-FA (PNV PRENATAL PLUS MULTIVITAMIN) 27-1 MG TABS per tablet Take 1 tablet by mouth daily 100 tablet 3      pyridOXINE (VITAMIN B6) 25 MG tablet Take 1 tablet (25 mg) by mouth 3 times daily 100 tablet 1    .        Maternal Past Medical History:     Past Medical History:   Diagnosis Date     No pertinent past medical history 02/09/2022     White classification A1 gestational diabetes mellitus (GDM), diet controlled 7/6/2022                       Family History:   I have reviewed this patient's family history            Social History:   I have reviewed this patient's social  history         Review of Systems:   CONSTITUTIONAL: NEGATIVE for fever, chills, change in weight  ENT/MOUTH: NEGATIVE for ear, mouth and throat problems  RESP: NEGATIVE for significant cough or SOB  CV: NEGATIVE for chest pain, palpitations or peripheral edema          Physical Exam:   Vitals were reviewed  All vitals stable  Temp: 97.4  F (36.3  C) Temp src: Oral BP: (!) 135/6 Pulse: 81   Resp: 18        Constitutional:   awake, alert, cooperative, no apparent distress, and appears stated age     Eyes:   Lids and lashes normal, pupils equal, round and reactive to light, extra ocular muscles intact, sclera clear, conjunctiva normal     ENT:   Normocephalic, without obvious abnormality, atraumatic, sinuses nontender on palpation, external ears without lesions, oral pharynx with moist mucous membranes, tonsils without erythema or exudates, gums normal and good dentition.     Neck:   Supple, symmetrical, trachea midline, no adenopathy, thyroid symmetric, not enlarged and no tenderness, skin normal     Hematologic / Lymphatic:   no cervical lymphadenopathy and no supraclavicular lymphadenopathy     Back:   Symmetric, no curvature, spinous processes are non-tender on palpation, paraspinous muscles are non-tender on palpation, no costal vertebral tenderness     Lungs:   No increased work of breathing, good air exchange, clear to auscultation bilaterally, no crackles or wheezing     Cardiovascular:   Normal apical impulse, regular rate and rhythm, normal S1 and S2, no S3 or S4, and no murmur noted     Abdomen:   No scars, normal bowel sounds, soft, gravid uterus munguia infant vtx EFW 8#, no masses palpated, no hepatosplenomegally     Genitounirinary:   External Genitalia:  General appearance; normal      Cervix:   Membranes: intact   Dilation: 3   Effacement: 50%   Station:-1   Consistency: average   Position: Mid  Presentation:Cephalic  Fetal Heart Rate Tracing: Tier 1 (normal)  Tocometer: external monitor                        Assessment:   Manuel Phillip is a 39w0d pregnant female admitted with induction of labor, indication IVF pregnancy, maternal AMA and A1 GDM.          Plan:   Admit - see IP orders  Observation  Labor induction with Pitocin and AROM  Anticipate vag deepika Medina MD

## 2022-09-11 NOTE — PLAN OF CARE
here @ 39 0/7 weeks for induction of labor due to gestational diabetes diet controlled. This is a IVF pregnancy and pt states this is a baby girl. Pt denies any UC, LOF or VB. Pt also denies any S&S of preE. Pt states good fetal movement. Pt was under the impression that her primary female doctor would be available today to manage her care. Physician call process was explained in length and pt discussed situation of male provider with her  and both were accepting of situation and consented verbally to male provider for SVE's and delivery. POC reviewed with pt and  and they deny any questions at this time.

## 2022-09-11 NOTE — PROVIDER NOTIFICATION
09/11/22 1004   Provider Notification   Provider Name/Title mira   Method of Notification At Bedside   Request Evaluate in Person   Notification Reason Status Update;SVE   Strip reviewed. POC reviewed with pt and . Pt consents to SVE will AROM after epidural per pt request

## 2022-09-11 NOTE — PROVIDER NOTIFICATION
09/11/22 1459   Provider Notification   Provider Name/Title mira   Method of Notification In Department   Notification Reason Status Update   Updated on pitocin level and comfort level

## 2022-09-12 LAB
ALBUMIN MFR UR ELPH: 8.1 MG/DL
ALBUMIN SERPL BCG-MCNC: 2.7 G/DL (ref 3.5–5.2)
ALP SERPL-CCNC: 114 U/L (ref 35–104)
ALT SERPL W P-5'-P-CCNC: 16 U/L (ref 10–35)
ANION GAP SERPL CALCULATED.3IONS-SCNC: 10 MMOL/L (ref 7–15)
AST SERPL W P-5'-P-CCNC: 26 U/L (ref 10–35)
BILIRUB SERPL-MCNC: 0.5 MG/DL
BUN SERPL-MCNC: 5 MG/DL (ref 6–20)
CALCIUM SERPL-MCNC: 8.7 MG/DL (ref 8.6–10)
CHLORIDE SERPL-SCNC: 102 MMOL/L (ref 98–107)
CREAT SERPL-MCNC: 0.5 MG/DL (ref 0.51–0.95)
CREAT SERPL-MCNC: 0.65 MG/DL (ref 0.51–0.95)
CREAT UR-MCNC: 46.7 MG/DL
DEPRECATED HCO3 PLAS-SCNC: 22 MMOL/L (ref 22–29)
ERYTHROCYTE [DISTWIDTH] IN BLOOD BY AUTOMATED COUNT: 13.3 % (ref 10–15)
GFR SERPL CREATININE-BSD FRML MDRD: >90 ML/MIN/1.73M2
GFR SERPL CREATININE-BSD FRML MDRD: >90 ML/MIN/1.73M2
GLUCOSE BLDC GLUCOMTR-MCNC: 78 MG/DL (ref 70–99)
GLUCOSE BLDC GLUCOMTR-MCNC: 84 MG/DL (ref 70–99)
GLUCOSE BLDC GLUCOMTR-MCNC: 85 MG/DL (ref 70–99)
GLUCOSE BLDC GLUCOMTR-MCNC: 95 MG/DL (ref 70–99)
GLUCOSE SERPL-MCNC: 117 MG/DL (ref 70–99)
GLUCOSE SERPL-MCNC: 150 MG/DL (ref 70–99)
HCT VFR BLD AUTO: 30.6 % (ref 35–47)
HGB BLD-MCNC: 10.1 G/DL (ref 11.7–15.7)
MCH RBC QN AUTO: 28.6 PG (ref 26.5–33)
MCHC RBC AUTO-ENTMCNC: 33 G/DL (ref 31.5–36.5)
MCV RBC AUTO: 87 FL (ref 78–100)
PLATELET # BLD AUTO: 253 10E3/UL (ref 150–450)
PLATELET # BLD AUTO: 263 10E3/UL (ref 150–450)
POTASSIUM SERPL-SCNC: 3.5 MMOL/L (ref 3.4–5.3)
PROT SERPL-MCNC: 5.5 G/DL (ref 6.4–8.3)
PROT/CREAT 24H UR: 0.17 MG/MG CR (ref 0–0.2)
RBC # BLD AUTO: 3.53 10E6/UL (ref 3.8–5.2)
SODIUM SERPL-SCNC: 134 MMOL/L (ref 136–145)
T PALLIDUM AB SER QL: NONREACTIVE
WBC # BLD AUTO: 7.6 10E3/UL (ref 4–11)

## 2022-09-12 PROCEDURE — 82947 ASSAY GLUCOSE BLOOD QUANT: CPT | Performed by: OBSTETRICS & GYNECOLOGY

## 2022-09-12 PROCEDURE — 84156 ASSAY OF PROTEIN URINE: CPT | Performed by: OBSTETRICS & GYNECOLOGY

## 2022-09-12 PROCEDURE — 36415 COLL VENOUS BLD VENIPUNCTURE: CPT | Performed by: OBSTETRICS & GYNECOLOGY

## 2022-09-12 PROCEDURE — 0KQM0ZZ REPAIR PERINEUM MUSCLE, OPEN APPROACH: ICD-10-PCS | Performed by: OBSTETRICS & GYNECOLOGY

## 2022-09-12 PROCEDURE — 85027 COMPLETE CBC AUTOMATED: CPT | Performed by: OBSTETRICS & GYNECOLOGY

## 2022-09-12 PROCEDURE — 00HU33Z INSERTION OF INFUSION DEVICE INTO SPINAL CANAL, PERCUTANEOUS APPROACH: ICD-10-PCS | Performed by: ANESTHESIOLOGY

## 2022-09-12 PROCEDURE — 250N000013 HC RX MED GY IP 250 OP 250 PS 637: Performed by: OBSTETRICS & GYNECOLOGY

## 2022-09-12 PROCEDURE — 80053 COMPREHEN METABOLIC PANEL: CPT | Performed by: OBSTETRICS & GYNECOLOGY

## 2022-09-12 PROCEDURE — 3E0R3BZ INTRODUCTION OF ANESTHETIC AGENT INTO SPINAL CANAL, PERCUTANEOUS APPROACH: ICD-10-PCS | Performed by: ANESTHESIOLOGY

## 2022-09-12 PROCEDURE — 82565 ASSAY OF CREATININE: CPT | Performed by: OBSTETRICS & GYNECOLOGY

## 2022-09-12 PROCEDURE — 250N000011 HC RX IP 250 OP 636: Performed by: ANESTHESIOLOGY

## 2022-09-12 PROCEDURE — 258N000003 HC RX IP 258 OP 636: Performed by: OBSTETRICS & GYNECOLOGY

## 2022-09-12 PROCEDURE — 250N000009 HC RX 250: Performed by: OBSTETRICS & GYNECOLOGY

## 2022-09-12 PROCEDURE — 250N000011 HC RX IP 250 OP 636: Performed by: OBSTETRICS & GYNECOLOGY

## 2022-09-12 PROCEDURE — 120N000001 HC R&B MED SURG/OB

## 2022-09-12 PROCEDURE — 10907ZC DRAINAGE OF AMNIOTIC FLUID, THERAPEUTIC FROM PRODUCTS OF CONCEPTION, VIA NATURAL OR ARTIFICIAL OPENING: ICD-10-PCS | Performed by: OBSTETRICS & GYNECOLOGY

## 2022-09-12 PROCEDURE — 59400 OBSTETRICAL CARE: CPT | Performed by: OBSTETRICS & GYNECOLOGY

## 2022-09-12 PROCEDURE — 258N000003 HC RX IP 258 OP 636: Performed by: ANESTHESIOLOGY

## 2022-09-12 PROCEDURE — 722N000001 HC LABOR CARE VAGINAL DELIVERY SINGLE

## 2022-09-12 PROCEDURE — 85049 AUTOMATED PLATELET COUNT: CPT | Performed by: OBSTETRICS & GYNECOLOGY

## 2022-09-12 RX ORDER — DOCUSATE SODIUM 100 MG/1
100 CAPSULE, LIQUID FILLED ORAL DAILY
Status: DISCONTINUED | OUTPATIENT
Start: 2022-09-12 | End: 2022-09-14 | Stop reason: HOSPADM

## 2022-09-12 RX ORDER — ENOXAPARIN SODIUM 100 MG/ML
40 INJECTION SUBCUTANEOUS EVERY 12 HOURS
Status: DISCONTINUED | OUTPATIENT
Start: 2022-09-12 | End: 2022-09-12

## 2022-09-12 RX ORDER — BISACODYL 10 MG
10 SUPPOSITORY, RECTAL RECTAL DAILY PRN
Status: DISCONTINUED | OUTPATIENT
Start: 2022-09-12 | End: 2022-09-14 | Stop reason: HOSPADM

## 2022-09-12 RX ORDER — METHYLERGONOVINE MALEATE 0.2 MG/ML
200 INJECTION INTRAVENOUS
Status: DISCONTINUED | OUTPATIENT
Start: 2022-09-12 | End: 2022-09-14 | Stop reason: HOSPADM

## 2022-09-12 RX ORDER — HYDROCORTISONE 25 MG/G
CREAM TOPICAL 3 TIMES DAILY PRN
Status: DISCONTINUED | OUTPATIENT
Start: 2022-09-12 | End: 2022-09-14 | Stop reason: HOSPADM

## 2022-09-12 RX ORDER — MISOPROSTOL 200 UG/1
800 TABLET ORAL
Status: DISCONTINUED | OUTPATIENT
Start: 2022-09-12 | End: 2022-09-14 | Stop reason: HOSPADM

## 2022-09-12 RX ORDER — MODIFIED LANOLIN
OINTMENT (GRAM) TOPICAL
Status: DISCONTINUED | OUTPATIENT
Start: 2022-09-12 | End: 2022-09-14 | Stop reason: HOSPADM

## 2022-09-12 RX ORDER — CARBOPROST TROMETHAMINE 250 UG/ML
250 INJECTION, SOLUTION INTRAMUSCULAR
Status: DISCONTINUED | OUTPATIENT
Start: 2022-09-12 | End: 2022-09-14 | Stop reason: HOSPADM

## 2022-09-12 RX ORDER — MISOPROSTOL 200 UG/1
400 TABLET ORAL
Status: DISCONTINUED | OUTPATIENT
Start: 2022-09-12 | End: 2022-09-14 | Stop reason: HOSPADM

## 2022-09-12 RX ORDER — ACETAMINOPHEN 325 MG/1
650 TABLET ORAL EVERY 4 HOURS PRN
Status: DISCONTINUED | OUTPATIENT
Start: 2022-09-12 | End: 2022-09-14 | Stop reason: HOSPADM

## 2022-09-12 RX ORDER — IBUPROFEN 800 MG/1
800 TABLET, FILM COATED ORAL EVERY 6 HOURS PRN
Status: DISCONTINUED | OUTPATIENT
Start: 2022-09-12 | End: 2022-09-14 | Stop reason: HOSPADM

## 2022-09-12 RX ORDER — OXYTOCIN 10 [USP'U]/ML
10 INJECTION, SOLUTION INTRAMUSCULAR; INTRAVENOUS
Status: DISCONTINUED | OUTPATIENT
Start: 2022-09-12 | End: 2022-09-14 | Stop reason: HOSPADM

## 2022-09-12 RX ORDER — CALCIUM CARBONATE 500 MG/1
1000 TABLET, CHEWABLE ORAL 2 TIMES DAILY PRN
Status: DISCONTINUED | OUTPATIENT
Start: 2022-09-12 | End: 2022-09-14 | Stop reason: HOSPADM

## 2022-09-12 RX ORDER — TRANEXAMIC ACID 10 MG/ML
1 INJECTION, SOLUTION INTRAVENOUS EVERY 30 MIN PRN
Status: DISCONTINUED | OUTPATIENT
Start: 2022-09-12 | End: 2022-09-14 | Stop reason: HOSPADM

## 2022-09-12 RX ORDER — OXYTOCIN/0.9 % SODIUM CHLORIDE 30/500 ML
340 PLASTIC BAG, INJECTION (ML) INTRAVENOUS CONTINUOUS PRN
Status: DISCONTINUED | OUTPATIENT
Start: 2022-09-12 | End: 2022-09-14 | Stop reason: HOSPADM

## 2022-09-12 RX ORDER — BUPIVACAINE HYDROCHLORIDE 2.5 MG/ML
INJECTION, SOLUTION EPIDURAL; INFILTRATION; INTRACAUDAL
Status: COMPLETED | OUTPATIENT
Start: 2022-09-12 | End: 2022-09-12

## 2022-09-12 RX ADMIN — IBUPROFEN 800 MG: 800 TABLET, FILM COATED ORAL at 21:33

## 2022-09-12 RX ADMIN — BUPIVACAINE HYDROCHLORIDE 10 ML: 2.5 INJECTION, SOLUTION EPIDURAL; INFILTRATION; INTRACAUDAL at 11:33

## 2022-09-12 RX ADMIN — MISOPROSTOL 25 MCG: 100 TABLET ORAL at 07:11

## 2022-09-12 RX ADMIN — HYDROXYZINE HYDROCHLORIDE 100 MG: 50 TABLET, FILM COATED ORAL at 02:51

## 2022-09-12 RX ADMIN — DOCUSATE SODIUM 100 MG: 100 CAPSULE, LIQUID FILLED ORAL at 21:00

## 2022-09-12 RX ADMIN — SODIUM CHLORIDE, POTASSIUM CHLORIDE, SODIUM LACTATE AND CALCIUM CHLORIDE 500 ML: 600; 310; 30; 20 INJECTION, SOLUTION INTRAVENOUS at 11:39

## 2022-09-12 RX ADMIN — FENTANYL CITRATE: 0.05 INJECTION, SOLUTION INTRAMUSCULAR; INTRAVENOUS at 11:39

## 2022-09-12 RX ADMIN — CALCIUM CARBONATE (ANTACID) CHEW TAB 500 MG 1000 MG: 500 CHEW TAB at 09:09

## 2022-09-12 RX ADMIN — KETOROLAC TROMETHAMINE 30 MG: 30 INJECTION, SOLUTION INTRAMUSCULAR at 15:37

## 2022-09-12 RX ADMIN — ACETAMINOPHEN 650 MG: 325 TABLET, FILM COATED ORAL at 22:00

## 2022-09-12 RX ADMIN — MISOPROSTOL 25 MCG: 100 TABLET ORAL at 00:35

## 2022-09-12 RX ADMIN — MISOPROSTOL 25 MCG: 100 TABLET ORAL at 02:49

## 2022-09-12 RX ADMIN — CALCIUM CARBONATE (ANTACID) CHEW TAB 500 MG 1000 MG: 500 CHEW TAB at 00:56

## 2022-09-12 RX ADMIN — Medication 2 MILLI-UNITS/MIN: at 09:53

## 2022-09-12 RX ADMIN — METHYLERGONOVINE MALEATE 200 MCG: 0.2 INJECTION, SOLUTION INTRAMUSCULAR; INTRAVENOUS at 15:14

## 2022-09-12 RX ADMIN — MISOPROSTOL 25 MCG: 100 TABLET ORAL at 04:57

## 2022-09-12 ASSESSMENT — ACTIVITIES OF DAILY LIVING (ADL)
ADLS_ACUITY_SCORE: 18

## 2022-09-12 NOTE — PROVIDER NOTIFICATION
09/12/22 0854   Provider Notification   Provider Name/Title Dr Chan   Method of Notification At Bedside   Dr Chan rounding/at bedside. Normal progression of IOL, risks/benefits of AROM, and timing of desired epidural discussed with patient. Patient verbalizes understanding and  agrees to AROM at this time. Tolerated procedure well. SVE per MD 3cm/60%. Moderate amount clear fluid visualized. Plan to start Pitocin

## 2022-09-12 NOTE — PROVIDER NOTIFICATION
09/12/22 1448   Provider Notification   Provider Name/Title Dr Medina   Method of Notification At Bedside   Request Attend Delivery   Dr Medina called to bedside after pt report of pressure at 1445 and found to be complete with baby at +1 station at 1446

## 2022-09-12 NOTE — PROGRESS NOTES
Intrapartum progress note:    S: Patient resting comfortably, rarely feeling contractions.     O:   Vitals:    22 0014 22 0022 22 0037 22 0422   BP:  132/67 (!) 153/91 (!) 145/75   BP Location:  Right arm Right arm Right arm   Patient Position:  Right side Right side Sitting   Cuff Size:  Adult Regular Adult Regular Adult Regular   Pulse:       Resp:    28   Temp:    98.2  F (36.8  C)   TempSrc:    Oral   SpO2: 98%         Gen: resting, in NAD  Cx: 3/50/-2 anterior, moderate  FHT: baseline 150, moderate variability, + accels, no decels  [place]: difficult to detect on toco, likely every 5 min    AROM clear fluid    Recent Labs   Lab 22  0715 22  0054 22  2127 22  1839 22  1403 22  1048   GLC 78 117* 90 77 86 112*        A/P: Manuel Phillip is a 42 year old female  at 39w1d here for IOL for AMA, IVF, and poorly controlled gestational diabetes.   - FWB: Cat I tracing, reactive  - IOL: s/p 13 hours of pitocin yesterday with no cervical change. She was unwilling to attempt AROM, therefore she was switched to cytotec overnight. She had 4 doses. Cervix unchanged this morning. She agreed to AROM, clear fluid. Restart pitocon per 2mu protocol.  - GESTATIONAL DIABETES: improved glucose control inpatient. Continue to monitor 4 TIMES PER DAY while eating. Transition to insulin drip in labor if indicated.   - ghtn: HELLP labs within normal limits. Mild range. Continue to monitor closely. Magnesium not indicated at this time.   - Analgesia: epidural when indicated  - Anticipate     Meera Chan MD 2022 9:06 AM

## 2022-09-12 NOTE — ANESTHESIA PREPROCEDURE EVALUATION
Anesthesia Pre-Procedure Evaluation    Patient: Manuel Phillip   MRN: [id number] : 1980        Procedure : * No procedures listed *          Past Medical History:   Diagnosis Date     No pertinent past medical history 2022     White classification A1 gestational diabetes mellitus (GDM), diet controlled 2022      Past Surgical History:   Procedure Laterality Date     CHOLECYSTECTOMY, LAPOROSCOPIC  3/09     ZZC GENITAL SURG PROC, FEMALE UNLISTED      female circumcision      Allergies   Allergen Reactions     No Known Drug Allergies       Social History     Tobacco Use     Smoking status: Never Smoker     Smokeless tobacco: Never Used   Substance Use Topics     Alcohol use: No      Wt Readings from Last 1 Encounters:   22 130.2 kg (287 lb)        Anesthesia Evaluation        No history of anesthetic complications       ROS/MED HX  ENT/Pulmonary:     (+) Intermittent, asthma     Neurologic:  - neg neurologic ROS     Cardiovascular:  - neg cardiovascular ROS     METS/Exercise Tolerance:     Hematologic:  - neg hematologic  ROS     Musculoskeletal:       GI/Hepatic:  - neg GI/hepatic ROS     Renal/Genitourinary:       Endo:     (+) type I DM, Obesity,     Psychiatric/Substance Use:  - neg psychiatric ROS     Infectious Disease:       Malignancy:       Other:     (-) previous  and TOLAC candidate       Physical Exam    Airway        Mallampati: II   TM distance: > 3 FB   Neck ROM: full   Mouth opening: > 3 cm    Respiratory Devices and Support         Dental  no notable dental history         Cardiovascular   cardiovascular exam normal          Pulmonary   pulmonary exam normal                OUTSIDE LABS:  CBC:   Lab Results   Component Value Date    WBC 7.6 2022    WBC 8.1 2022    HGB 10.1 (L) 2022    HGB 10.4 (L) 2022    HCT 30.6 (L) 2022    HCT 33.8 (L) 2022     2022     2022     BMP:   Lab Results   Component Value Date    NA  134 (L) 09/12/2022     05/12/2022    POTASSIUM 3.5 09/12/2022    POTASSIUM 4.3 05/12/2022    CHLORIDE 102 09/12/2022    CHLORIDE 105 05/12/2022    CO2 22 09/12/2022    CO2 21 05/12/2022    BUN 5.0 (L) 09/12/2022    BUN 7 05/12/2022    CR 0.50 (L) 09/12/2022    CR 0.41 (L) 05/12/2022    GLC 85 09/12/2022    GLC 78 09/12/2022     COAGS: No results found for: PTT, INR, FIBR  POC:   Lab Results   Component Value Date    HCG Negative 12/06/2011     HEPATIC:   Lab Results   Component Value Date    ALBUMIN 2.7 (L) 09/12/2022    PROTTOTAL 5.5 (L) 09/12/2022    ALT 16 09/12/2022    AST 26 09/12/2022    ALKPHOS 114 (H) 09/12/2022    BILITOTAL 0.5 09/12/2022     OTHER:   Lab Results   Component Value Date    BJ 8.7 09/12/2022    LIPASE 54 03/01/2010    AMYLASE 47 03/01/2010    TSH 1.43 02/26/2013       Anesthesia Plan    ASA Status:  3      Anesthesia Type: Epidural.              Consents    Anesthesia Plan(s) and associated risks, benefits, and realistic alternatives discussed. Questions answered and patient/representative(s) expressed understanding.    - Discussed:     - Discussed with:  Patient         Postoperative Care            Comments:           neg OB ROS.       Santi Mcdermott MD

## 2022-09-12 NOTE — PROVIDER NOTIFICATION
09/12/22 0023   Provider Notification   Provider Name/Title Dr. Medina   Method of Notification Phone   Request Evaluate - Remote   Notification Reason Maternal Vital Sign Change;Status Update     Physician informed of consecutive elevated blood pressures, one severe range. Pt also complains of a HA; however, was resolved with a cup of tea. Significant generalized swelling. No other signs/symptoms of pre eclampsia. Orders received:   - urine protein creatinine ratio   - cbc   - cmp  Telephone orders read back and verified. Will inform pt of plan of care.     Physician informed the ines monitor had a hard time monitoring while patient was up, in the bathroom, and walking around the room. Pt desired to walk the halls too, so first cytotec dose has been delayed until a reactive strip is captured. Ines removed. External monitors applied. Fetal heart rate is currently cat 1 with uterine irriability. Will continue to monitor and administer cytotec when appropriate. Ariella Elaine RN on 9/12/2022 at 1:35 AM

## 2022-09-12 NOTE — PROVIDER NOTIFICATION
09/11/22 2115   Provider Notification   Provider Name/Title Carol   Method of Notification In Department   Request Evaluate in Person   Notification Reason Labor Status;SVE   Updated Dr. Medina with SVE 2.5-3/50-60%/-2, pitocin now at 18 mu/hr, pt comfortable rating her pain at 3/10 with contractions and 0/10 in between. Plan is to stop pitocin, wait 1 hr and add PO Cytotec for a total of 6 doses overnight and reassess in the morning. FHT Category I with moderate variability.

## 2022-09-12 NOTE — PLAN OF CARE
Pt currently in bed, awake. Does not feel contractions. Tripling and coupling. FHR within normal limits, moderate variability, accelerations, and variable decels. 250 ml IVF bolus administered in attempt to regulate contractions. 4th dose of cytotec administered. Report given to RYANN Syed, who assumes cares. Ariella Elaine RN on 9/12/2022 at 7:48 AM

## 2022-09-12 NOTE — L&D DELIVERY NOTE
OB Vaginal Delivery Note    Manuel Phillip MRN# [id number]   Age: 42 year old YOB: 1980       GA: 39w1d  GP:   Labor Complications: None   EBL:   mL  Delivery QBL: 125 mL  Delivery Type: Vaginal, Spontaneous   ROM to Delivery Time: (Delivered) Hours: 6 Minutes: 3   Weight:     1 Minute 5 Minute 10 Minute   Apgar Totals: 8   9        PARAG MCKINLEY     Delivery Details:  Manuel Phillip, a 42 year old  female delivered a viable infant with apgars of 8  and 9 . Patient was fully dilated and pushing after   hours   minutes in active labor. Delivery was via vaginal, spontaneous  to a sterile field under epidural  anesthesia. Infant delivered in vertex    occiput  anterior  position. Anterior and posterior shoulders delivered without difficulty. The cord was clamped, cut twice and   were noted. Cord blood was obtained in routine fashion with the following disposition:  .      Cord complications: knot   Placenta delivered at 2022  3:09 PM . Placental disposition was Hospital disposal . Fundal massage performed and fundus found to be firm.     Episiotomy: none    Perineum, vagina, cervix were inspected, and the following lacerations were noted:   Perineal lacerations: 2nd                Any lacerations were repaired in the usual fashion using 3-0 chromic in layers.    Excellent hemostasis was noted. Needle count correct. Infant and patient in delivery room in good and stable condition.        Kenji Female-Manuel [[id number]]    Labor Event Times    Labor onset date: 22 Onset time: 11:00 AM   Dilation complete date: 22 Complete time:  2:46 PM   Start pushing date/time: 2022 1448      Labor Events     labor?: No   steroids: None  Labor Type: Induction/Cervical ripening  Predominate monitoring during 1st stage: continuous electronic fetal monitoring     Rupture identifier: Sac 1  Rupture date/time: 22 0902   Rupture type: Artificial Rupture of Membranes  Fluid  color: Clear  Fluid odor: Normal     Induction: Misoprostol, Oxytocin, AROM  Induction date/time:     Cervical ripening date/time: 22 0035      1:1 continuous labor support provided by?: RN       Delivery/Placenta Date and Time    Delivery Date: 22 Delivery Time:  3:05 PM   Placenta Date/Time: 2022  3:09 PM  Oxytocin given at the time of delivery: after delivery of placenta  Delivering clinician: Spencer Medina MD   Other personnel present at delivery:  Provider Role   Yahaira Rogers RN Delivery Nurse         Vaginal Counts     Initial count performed by 2 team members:  Two Team Members   MD Ken Medina RNC       Needles Suture Needles Sponges (RETIRED) Instruments   Initial counts 2  5    Added to count  1     Relief counts       Final counts 2 1 5          Placed during labor Accounted for at the end of labor   FSE No    IUPC No    Cervidil No               Final count performed by 2 team members:  Two Team Members   Carol Hubbard RN      Final count correct?: Yes     Apgars    Living status: Living   1 Minute 5 Minute 10 Minute 15 Minute 20 Minute   Skin color: 1  1       Heart rate: 2  2       Reflex irritability: 2  2       Muscle tone: 2  2       Respiratory effort: 1  2       Total: 8  9          Cord    Cord Complications: Knot               Stem cell collection?: No       [facility] Res[facility]    Methods: None   Care at Delivery:   NICU Team:  Called to attend the delivery due to shoulder dystocia. Infant delivered with spontaneous cry and respirations. NICU team not needed and dismissed.     Lorri Rosado APRN CNP 2022 3:13 PM          Skin to Skin and Feeding Plan    Skin to skin initiation date/time: 1841    Skin to skin with: Mother  Skin to skin end date/time:        Labor Events and Shoulder Dystocia    Fetal Tracing Prior to Delivery: Category 1  Shoulder dystocia present?: Pos  Anterior shoulder: right    Gentle attempt at traction, assisted by maternal  expulsive forces?: Yes       First Maneuver: Andres maneuver, Suprapubic pressure       Delivery (Maternal) (Provider to Complete) ([id number])    Episiotomy: None  Perineal lacerations: 2nd Repaired?: Yes   Repair suture: 3-0 Chromic  Genital tract inspection done: Pos     Blood Loss  Mother: Manuel Phillip N #[id number]   Start of Mother's Information    Delivery Blood Loss  09/12/22 1100 - 09/12/22 1539    Delivery QBL (mL) Hospital Encounter 125 mL    Total  125 mL         End of Mother's Information  Mother: Manuel Phillip N #[id number]          Delivery - Provider to Complete ([id number])    Delivering clinician: Spencer Medina MD  Attempted Delivery Types (Choose all that apply): Spontaneous Vaginal Delivery  Delivery Type (Choose the 1 that will go to the Birth History): Vaginal, Spontaneous                   Other personnel:  Provider Role   Yahaira Rogers, RN Delivery Nurse                Placenta    Date/Time: 9/12/2022  3:09 PM  Removal: Spontaneous  Disposition: Hospital disposal           Anesthesia    Method: Epidural                Presentation and Position    Presentation: Vertex     Occiput Anterior                 Spencer Medina MD   Yes - the patient is able to be screened

## 2022-09-12 NOTE — PROVIDER NOTIFICATION
09/12/22 1836   Provider Notification   Provider Name/Title NoonanMD   Method of Notification In Department   Request Evaluate-Remote   Notification Reason Status Update;Vital Signs Change   Reviewed VS and elevated ones with positional changes and cuff placement.  Denies PreE sx's, edema present. BP cuff on lower forearm- when arm is still BP's WNL. Labs reviewed. Glucose drawn after dinner and juice intake. Pt unable to ambulate. FFU midline, light flow. Attempted to straight cath since pt unable to ambulate- unable due to pt discomfort with periurethral tear and pt wouldn't hold still. Will delay and attempt to ambulate/void. Bleeding and fundus stable.

## 2022-09-12 NOTE — PROGRESS NOTES
Pt noted to have several elevated systolic BP readings without headache visual changes or RUQ pain.  FHT's reactive reassuring.  PIH labs checked and other than a mildly elevated Alk P'tase, values are acceptable and not consistent with preeclampsia.  Urin protein creat ratio pending awaiting pt's ability to void.    Pt had been on pitocin up to 18 miu with contractions q2-3 min apart Pt rates pain at a 3/10 with ctx's and 0/10 between.  Her cervix is unchanged despite 13+ hours of pitocin stimulation    A/P:  IUP 39.1 weeks with IOL for maternal AMA and A1 GDM and maternal BMI 40.    Elevated BP readings in a pt with obesity who is otherwise assymptomatic.  Will monitor closely.  Will not start magnesium sulfate at this time.  Will stop pitocin and begin PO Cytotec x's 6 doses and re eval.  Pt not able to tolerate AROM at this time

## 2022-09-12 NOTE — ANESTHESIA PROCEDURE NOTES
Epidural catheter Procedure Note    Pre-Procedure   Staff -        Anesthesiologist:  Santi Mcdermott MD       Performed By: anesthesiologist       Referred By: Dustin       Location: OB       Pre-Anesthestic Checklist: patient identified, IV checked, risks and benefits discussed, informed consent, monitors and equipment checked, pre-op evaluation, at physician/surgeon's request and post-op pain management  Timeout:       Correct Patient: Yes        Correct Procedure: Yes        Correct Site: Yes        Correct Position: Yes   Procedure Documentation  Procedure: epidural catheter       Patient Position: sitting       Patient Prep/Sterile Barriers: sterile gloves, mask, patient draped       Skin prep: Betadine       Local skin infiltrated with mL of 1% lidocaine.        Insertion Site: L2-3. (midline approach).       Technique: LORT saline        UYEN at 7 cm.       Needle Type: [employer] needle       Needle Gauge: 17.        Needle Length (Inches): 3.5        Catheter: 19 G.          Catheter threaded easily.         6 cm epidural space.         Threaded 13 cm at skin.         # of attempts: 1 and  # of redirects:  1    Assessment/Narrative         Paresthesias: No.       Test dose of 3 mL lidocaine 1.5% w/ 1:200,000 epinephrine at.         Test dose negative, 3 minutes after injection, for signs of intravascular, subdural, or intrathecal injection.       Insertion/Infusion Method: LORT saline       Aspiration negative for Heme or CSF via Epidural Catheter.    Medication(s) Administered   0.25% Bupivacaine PF (Epidural) - EPIDURAL   10 mL - 9/12/2022 11:33:00 AM

## 2022-09-12 NOTE — PLAN OF CARE
Patient would like to walk in the halls for awhile. Pitocin stopped greater than 1 hr ago. Cat 1 fetal heart rate tracing. Monitor turned off. Will reapply at 2330. If reactive strip, will administer cytotec at 0000. Ariella Elaine RN on 9/12/2022 at 7:44 AM (late entry for 9/11/2022 [id number]).

## 2022-09-12 NOTE — PROVIDER NOTIFICATION
09/12/22 1410   Provider Notification   Provider Name/Title Dr Chan   Method of Notification Phone   Update given to Dr Chan via phone. Most recent SVE 9.5cm/0 station. Pt comfortable, slight rectal pressure at times. Blood sugars, VS WDL.

## 2022-09-13 LAB — HGB BLD-MCNC: 8.8 G/DL (ref 11.7–15.7)

## 2022-09-13 PROCEDURE — 85018 HEMOGLOBIN: CPT | Performed by: OBSTETRICS & GYNECOLOGY

## 2022-09-13 PROCEDURE — 36415 COLL VENOUS BLD VENIPUNCTURE: CPT | Performed by: OBSTETRICS & GYNECOLOGY

## 2022-09-13 PROCEDURE — 120N000001 HC R&B MED SURG/OB

## 2022-09-13 PROCEDURE — 250N000013 HC RX MED GY IP 250 OP 250 PS 637: Performed by: OBSTETRICS & GYNECOLOGY

## 2022-09-13 RX ORDER — NALOXONE HYDROCHLORIDE 0.4 MG/ML
0.4 INJECTION, SOLUTION INTRAMUSCULAR; INTRAVENOUS; SUBCUTANEOUS
Status: DISCONTINUED | OUTPATIENT
Start: 2022-09-13 | End: 2022-09-14 | Stop reason: HOSPADM

## 2022-09-13 RX ORDER — NALOXONE HYDROCHLORIDE 0.4 MG/ML
0.2 INJECTION, SOLUTION INTRAMUSCULAR; INTRAVENOUS; SUBCUTANEOUS
Status: DISCONTINUED | OUTPATIENT
Start: 2022-09-13 | End: 2022-09-14 | Stop reason: HOSPADM

## 2022-09-13 RX ADMIN — IBUPROFEN 800 MG: 800 TABLET, FILM COATED ORAL at 16:47

## 2022-09-13 RX ADMIN — DOCUSATE SODIUM 100 MG: 100 CAPSULE, LIQUID FILLED ORAL at 08:57

## 2022-09-13 RX ADMIN — ACETAMINOPHEN 650 MG: 325 TABLET, FILM COATED ORAL at 02:49

## 2022-09-13 ASSESSMENT — ACTIVITIES OF DAILY LIVING (ADL)
ADLS_ACUITY_SCORE: 20
ADLS_ACUITY_SCORE: 20
ADLS_ACUITY_SCORE: 18
ADLS_ACUITY_SCORE: 20
ADLS_ACUITY_SCORE: 18
ADLS_ACUITY_SCORE: 20

## 2022-09-13 NOTE — PLAN OF CARE
Hypertensive, MD aware. Labs were drawn previous shift. MD would like to notify if BP is in extreme ranges. All other vitals are WDL.  Pain at worst 10/10, tylenol and ibuprofen given with some relief. Breastfeeding but infant sleepy at breast. Donor breast milk given to infant. Started to pump at 0300. FOB attentive and at bedside. Fundus firm, at U and scant bleeding.

## 2022-09-13 NOTE — PLAN OF CARE
Data: Vital signs within normal limits. Postpartum checks within normal limits - see flow record. Patient eating and drinking normally. Patient able to empty bladder independently and is up ambulating in room and in bathroom. No apparent signs of infection. Second degree laceration healing well, patient is using TUCKS, and ice packs. Patient performing self cares and is able to care for infant more this afternoon. Encouraged patient to try to attempt to put baby to breast on her own today, rather than wait for nursing staff to come into room every time to put baby to breast for her, and remind her what time her baby was to eat. Every feeding, nursing staff has had to come into patient's room to help patient with feeding. Which is not the issue, patient will call out for help, but patient will be in the bathroom, and baby is sleeping in the bassinet, sound asleep, and has not even been woken up to try to eat. This nurse has not seen patient's  hold baby all day, change a diaper, or give the baby any donor milk. Patient has asked questions however, but has either slept, or was not in the room.   Action: Patient medicated during the shift for cramping. See MAR. Patient reassessed within 1 hour after each medication and pain was improved - patient stated she was comfortable. Patient education done about pain medication schedules, test schedules. See flow record.  Response: Positive attachment behaviors observed with infant.  at bedside  Plan: Anticipate discharge on 9/14/22

## 2022-09-13 NOTE — ANESTHESIA POSTPROCEDURE EVALUATION
Patient: Amaal N Ali    Procedure: * No procedures listed *       Anesthesia Type:  Epidural    Note:     Postop Pain Control:    PONV:    Neuro/Psych:    Airway/Respiratory:    CV/Hemodynamics:    Other NRE:    DID A NON-ROUTINE EVENT OCCUR?     Event details/Postop Comments:      S/P epidural for labor.   I or my partner was immediately available for management of this patient during epidural analgesia infusion.  VSS.  Doing well. Block resolved.  Neuro at baseline. Denies positional headache. Minimal side effects easily managed w/ PRN meds. No apparent anesthetic complications. No follow-up required.    Josie Valerio MD             Last vitals:  Vitals:    09/12/22 2050 09/13/22 0041 09/13/22 0513   BP: (!) 148/73 (!) 150/73 (!) 145/72   Pulse: 80  79   Resp: 18 16 18   Temp: 98.3  F (36.8  C) 98.3  F (36.8  C) 97.9  F (36.6  C)   SpO2:          Electronically Signed By: Josie Valerio MD  September 13, 2022  10:29 AM

## 2022-09-13 NOTE — PROGRESS NOTES
[facility] Obstetrics Post-Partum Progress Note          Assessment and Plan:    Assessment:   Post-partum day #1  Induced vaginal delivery secondary to OLGA/IVF/GDM  L&D complications: None      Doing well.  No excessive bleeding  Pain well-controlled.      Plan:   Ambulation encouraged  Pain control measures as needed  Anticipate discharge tomorrow           Interval History:   Doing well.  Pain is well-controlled.  No fevers.  No history of foul-smelling vaginal discharge.  Good appetite.  Denies chest pain, shortness of breath, nausea or vomiting.  Vaginal bleeding is similar to a heavy menstrual flow.  Ambulatory.  Breastfeeding well.          Significant Problems:      Past Medical History:   Diagnosis Date     No pertinent past medical history 02/09/2022     White classification A1 gestational diabetes mellitus (GDM), diet controlled 7/6/2022                   Physical Exam:     All vitals stable  Patient Vitals for the past 12 hrs:   BP Temp Temp src Pulse Resp   09/13/22 0513 (!) 145/72 97.9  F (36.6  C) Oral 79 18   09/13/22 0041 (!) 150/73 98.3  F (36.8  C) Oral -- 16     Uterine fundus is firm, non-tender and at the level of the umbilicus  3+ edema bilat     Des Rodrigues MD  9/13/2022 9:43 AM

## 2022-09-13 NOTE — CARE PLAN
Data: Manuel Phillip transferred to 425 via cart at 1850. Baby transferred via parent's arms.  Action: Receiving unit notified of transfer: Yes. Patient and family notified of room change. Report given to Alejandro RN at 1930. Belongings sent to receiving unit. Accompanied by Registered Nurse. Oriented patient to surroundings. Call light within reach. ID bands double-checked with receiving RN.  Response: Patient tolerated transfer and is stable.    Pt unable to stand and ambulate at transfer. FFU , light lochia, midline. Pt aware she will need to ask for assist to get up to void within the next 2 hours

## 2022-09-13 NOTE — PLAN OF CARE
"~ 0830 have attempted to obtain vital signs and assessment on patient. Patient up in chair, eating. Wishes to have these done a little later.  ~ 1100 Attempted to obtain vital signs and assessment again on patient. And was told she was taking a nap. She did not want to be \"bothered\". And to please come back a little later.   Explained to patient these things need to be done sooner than later this shift.   "

## 2022-09-14 VITALS
HEART RATE: 86 BPM | TEMPERATURE: 98 F | DIASTOLIC BLOOD PRESSURE: 80 MMHG | SYSTOLIC BLOOD PRESSURE: 148 MMHG | OXYGEN SATURATION: 100 % | RESPIRATION RATE: 18 BRPM

## 2022-09-14 PROBLEM — O99.213 OBESITY AFFECTING PREGNANCY IN THIRD TRIMESTER: Status: RESOLVED | Noted: 2022-07-06 | Resolved: 2022-09-14

## 2022-09-14 PROBLEM — O09.813 PREGNANCY RESULTING FROM IN VITRO FERTILIZATION IN THIRD TRIMESTER: Status: RESOLVED | Noted: 2022-06-09 | Resolved: 2022-09-14

## 2022-09-14 PROBLEM — O09.523 MULTIGRAVIDA OF ADVANCED MATERNAL AGE IN THIRD TRIMESTER: Status: RESOLVED | Noted: 2022-07-06 | Resolved: 2022-09-14

## 2022-09-14 PROBLEM — O09.899 SUPERVISION OF OTHER HIGH RISK PREGNANCY, ANTEPARTUM: Status: RESOLVED | Noted: 2022-08-19 | Resolved: 2022-09-14

## 2022-09-14 PROBLEM — O98.512 COVID-19 AFFECTING PREGNANCY IN SECOND TRIMESTER: Status: RESOLVED | Noted: 2022-07-06 | Resolved: 2022-09-14

## 2022-09-14 PROBLEM — U07.1 COVID-19 AFFECTING PREGNANCY IN SECOND TRIMESTER: Status: RESOLVED | Noted: 2022-07-06 | Resolved: 2022-09-14

## 2022-09-14 PROCEDURE — 250N000013 HC RX MED GY IP 250 OP 250 PS 637: Performed by: OBSTETRICS & GYNECOLOGY

## 2022-09-14 RX ORDER — IBUPROFEN 600 MG/1
600 TABLET, FILM COATED ORAL EVERY 6 HOURS PRN
Qty: 30 TABLET | Refills: 0 | Status: SHIPPED | OUTPATIENT
Start: 2022-09-14 | End: 2024-07-16

## 2022-09-14 RX ORDER — FERROUS SULFATE 325(65) MG
325 TABLET ORAL
Qty: 14 TABLET | Refills: 0 | Status: SHIPPED | OUTPATIENT
Start: 2022-09-14 | End: 2022-09-28

## 2022-09-14 RX ORDER — DOCUSATE SODIUM 100 MG/1
100 CAPSULE, LIQUID FILLED ORAL 2 TIMES DAILY
Qty: 30 CAPSULE | Refills: 0 | Status: SHIPPED | OUTPATIENT
Start: 2022-09-14 | End: 2024-07-16

## 2022-09-14 RX ADMIN — IBUPROFEN 800 MG: 800 TABLET, FILM COATED ORAL at 08:33

## 2022-09-14 RX ADMIN — DOCUSATE SODIUM 100 MG: 100 CAPSULE, LIQUID FILLED ORAL at 08:33

## 2022-09-14 RX ADMIN — IBUPROFEN 800 MG: 800 TABLET, FILM COATED ORAL at 00:06

## 2022-09-14 ASSESSMENT — ACTIVITIES OF DAILY LIVING (ADL)
ADLS_ACUITY_SCORE: 20
DEPENDENT_IADLS:: INDEPENDENT
ADLS_ACUITY_SCORE: 20

## 2022-09-14 NOTE — PLAN OF CARE
VSS. Up ad jimbo. Voiding without difficulty. Lochia scant, no clots. Fundus firm and midline 1 cm below umbilicus. Pain well managed with Ibuprofen. Breastfeeding every 2-3 hours, followed by donor breast milk, tolerating well. FOB supportive and at bedside. Bonding well with infant.

## 2022-09-14 NOTE — DISCHARGE INSTRUCTIONS

## 2022-09-14 NOTE — LACTATION NOTE
This note was copied from a baby's chart.  Primary nurse requested a lactation visit for mother. Mother's last child was born over 16 years ago and she felt as though she was starting over. Infant is currently on a Bili blanket due to elevated TSB. Infant is breastfeeding as well as taking 15-20ml of donor milk with a bottle.Mother is pumping post feedings, she feels concerned that she has not gotten any milk. Educated mother on the rational for pumping after feedings and that it is completley normal to not be seeing any colostrum in the pump. Nurse was able to assist with a feeding . Mother has a good amount of  Colostrum with gentle hand expression. Infant latched without the shield on the left side for Primary RN. Encouraged pt to call for help with feedings if needed  and to continue to pump.

## 2022-09-14 NOTE — PROGRESS NOTES
PHN in to see pt to discuss [facility] programs. Patient is not interested in referral for a nurse visit at this time but will reach out to [facility] if interested in scheduling a nurse visit. PHN discussed [facility] community resource guide and rack cards and left these resources with patient.

## 2022-09-14 NOTE — PLAN OF CARE
Patient's After Visit Summary was reviewed with patient and/or spouse.   Patient verbalized understanding of After Visit Summary, recommended follow up and was given an opportunity to ask questions.   Discharge medications sent home with patient/family: YES   Discharged with spouse    Data: Vital signs within normal limits. Postpartum checks within normal limits - see flow record. Patient eating and drinking normally. Patient able to empty bladder independently and is up ambulating. No apparent signs of infection. Second degree pepito urethral laceration healing well. Patient is using ICE packs, and TUCKS pads. Patient performing self cares and is able to care for infant. Patient and  are bonding well with infant.   Action: Patient medicated during the shift for pain and cramping. See MAR. Patient reassessed within 1 hour after each medication and pain was improved - patient stated she was comfortable. Patient education done about discharge instructions, medication schedules, breastfeeding schedules. See flow record.  Response: Positive attachment behaviors observed with infant.  at bedside and is supportive. Discharge medications given to patient.  Plan: Anticipate discharge on 9/14/22

## 2022-09-16 ENCOUNTER — HOSPITAL ENCOUNTER (EMERGENCY)
Facility: CLINIC | Age: 42
Discharge: LEFT BEFORE DISCHARGE | End: 2022-09-16
Attending: EMERGENCY MEDICINE | Admitting: EMERGENCY MEDICINE
Payer: COMMERCIAL

## 2022-09-16 ENCOUNTER — HOSPITAL ENCOUNTER (OUTPATIENT)
Dept: CT IMAGING | Facility: CLINIC | Age: 42
Discharge: HOME | End: 2022-11-02
Attending: EMERGENCY MEDICINE | Admitting: EMERGENCY MEDICINE
Payer: COMMERCIAL

## 2022-09-16 VITALS
TEMPERATURE: 97.9 F | HEART RATE: 68 BPM | OXYGEN SATURATION: 97 % | RESPIRATION RATE: 18 BRPM | DIASTOLIC BLOOD PRESSURE: 78 MMHG | SYSTOLIC BLOOD PRESSURE: 131 MMHG

## 2022-09-16 DIAGNOSIS — R06.09 DYSPNEA ON EXERTION: ICD-10-CM

## 2022-09-16 DIAGNOSIS — R06.02 SOB (SHORTNESS OF BREATH): ICD-10-CM

## 2022-09-16 DIAGNOSIS — R79.89 ABNORMAL LFTS: ICD-10-CM

## 2022-09-16 LAB
ALBUMIN SERPL BCG-MCNC: 3.3 G/DL (ref 3.5–5.2)
ALP SERPL-CCNC: 113 U/L (ref 35–104)
ALT SERPL W P-5'-P-CCNC: 65 U/L (ref 10–35)
ANION GAP SERPL CALCULATED.3IONS-SCNC: 11 MMOL/L (ref 7–15)
AST SERPL W P-5'-P-CCNC: 81 U/L (ref 10–35)
ATRIAL RATE - MUSE: 86 BPM
BASOPHILS # BLD AUTO: 0 10E3/UL (ref 0–0.2)
BASOPHILS NFR BLD AUTO: 1 %
BILIRUB SERPL-MCNC: 0.4 MG/DL
BUN SERPL-MCNC: 6.6 MG/DL (ref 6–20)
CALCIUM SERPL-MCNC: 8.8 MG/DL (ref 8.6–10)
CHLORIDE SERPL-SCNC: 103 MMOL/L (ref 98–107)
CREAT SERPL-MCNC: 0.58 MG/DL (ref 0.51–0.95)
D DIMER PPP FEU-MCNC: 2.8 UG/ML FEU (ref 0–0.5)
DEPRECATED HCO3 PLAS-SCNC: 23 MMOL/L (ref 22–29)
DIASTOLIC BLOOD PRESSURE - MUSE: NORMAL MMHG
EOSINOPHIL # BLD AUTO: 0.4 10E3/UL (ref 0–0.7)
EOSINOPHIL NFR BLD AUTO: 5 %
ERYTHROCYTE [DISTWIDTH] IN BLOOD BY AUTOMATED COUNT: 14.2 % (ref 10–15)
FLUAV RNA SPEC QL NAA+PROBE: NEGATIVE
FLUBV RNA RESP QL NAA+PROBE: NEGATIVE
GFR SERPL CREATININE-BSD FRML MDRD: >90 ML/MIN/1.73M2
GLUCOSE SERPL-MCNC: 98 MG/DL (ref 70–99)
HCT VFR BLD AUTO: 30.3 % (ref 35–47)
HGB BLD-MCNC: 9.4 G/DL (ref 11.7–15.7)
HOLD SPECIMEN: NORMAL
IMM GRANULOCYTES # BLD: 0 10E3/UL
IMM GRANULOCYTES NFR BLD: 0 %
INTERPRETATION ECG - MUSE: NORMAL
LYMPHOCYTES # BLD AUTO: 2.1 10E3/UL (ref 0.8–5.3)
LYMPHOCYTES NFR BLD AUTO: 27 %
MCH RBC QN AUTO: 28.4 PG (ref 26.5–33)
MCHC RBC AUTO-ENTMCNC: 31 G/DL (ref 31.5–36.5)
MCV RBC AUTO: 92 FL (ref 78–100)
MONOCYTES # BLD AUTO: 0.6 10E3/UL (ref 0–1.3)
MONOCYTES NFR BLD AUTO: 8 %
NEUTROPHILS # BLD AUTO: 4.7 10E3/UL (ref 1.6–8.3)
NEUTROPHILS NFR BLD AUTO: 59 %
NRBC # BLD AUTO: 0 10E3/UL
NRBC BLD AUTO-RTO: 0 /100
P AXIS - MUSE: 52 DEGREES
PLATELET # BLD AUTO: 346 10E3/UL (ref 150–450)
POTASSIUM SERPL-SCNC: 3.7 MMOL/L (ref 3.4–5.3)
PR INTERVAL - MUSE: 142 MS
PROT SERPL-MCNC: 6.3 G/DL (ref 6.4–8.3)
QRS DURATION - MUSE: 78 MS
QT - MUSE: 394 MS
QTC - MUSE: 471 MS
R AXIS - MUSE: 40 DEGREES
RBC # BLD AUTO: 3.31 10E6/UL (ref 3.8–5.2)
RSV RNA SPEC NAA+PROBE: NEGATIVE
SARS-COV-2 RNA RESP QL NAA+PROBE: NEGATIVE
SODIUM SERPL-SCNC: 137 MMOL/L (ref 136–145)
SYSTOLIC BLOOD PRESSURE - MUSE: NORMAL MMHG
T AXIS - MUSE: 10 DEGREES
TROPONIN T SERPL HS-MCNC: 14 NG/L
VENTRICULAR RATE- MUSE: 86 BPM
WBC # BLD AUTO: 7.8 10E3/UL (ref 4–11)

## 2022-09-16 PROCEDURE — 93005 ELECTROCARDIOGRAM TRACING: CPT

## 2022-09-16 PROCEDURE — 84484 ASSAY OF TROPONIN QUANT: CPT | Performed by: EMERGENCY MEDICINE

## 2022-09-16 PROCEDURE — 80053 COMPREHEN METABOLIC PANEL: CPT | Performed by: EMERGENCY MEDICINE

## 2022-09-16 PROCEDURE — 99284 EMERGENCY DEPT VISIT MOD MDM: CPT

## 2022-09-16 PROCEDURE — 85379 FIBRIN DEGRADATION QUANT: CPT | Performed by: EMERGENCY MEDICINE

## 2022-09-16 PROCEDURE — 250N000013 HC RX MED GY IP 250 OP 250 PS 637: Performed by: EMERGENCY MEDICINE

## 2022-09-16 PROCEDURE — C9803 HOPD COVID-19 SPEC COLLECT: HCPCS

## 2022-09-16 PROCEDURE — 250N000009 HC RX 250: Performed by: EMERGENCY MEDICINE

## 2022-09-16 PROCEDURE — 36415 COLL VENOUS BLD VENIPUNCTURE: CPT | Performed by: EMERGENCY MEDICINE

## 2022-09-16 PROCEDURE — 85025 COMPLETE CBC W/AUTO DIFF WBC: CPT | Performed by: EMERGENCY MEDICINE

## 2022-09-16 PROCEDURE — 250N000011 HC RX IP 250 OP 636: Performed by: EMERGENCY MEDICINE

## 2022-09-16 PROCEDURE — 87637 SARSCOV2&INF A&B&RSV AMP PRB: CPT | Performed by: EMERGENCY MEDICINE

## 2022-09-16 RX ORDER — IOPAMIDOL 755 MG/ML
500 INJECTION, SOLUTION INTRAVASCULAR ONCE
Status: DISCONTINUED | OUTPATIENT
Start: 2022-09-16 | End: 2022-09-16 | Stop reason: HOSPADM

## 2022-09-16 RX ORDER — LORAZEPAM 0.5 MG/1
1 TABLET ORAL ONCE
Status: DISCONTINUED | OUTPATIENT
Start: 2022-09-16 | End: 2022-09-16 | Stop reason: HOSPADM

## 2022-09-16 RX ORDER — ACETAMINOPHEN 650 MG/1
650 SUPPOSITORY RECTAL ONCE
Status: DISCONTINUED | OUTPATIENT
Start: 2022-09-16 | End: 2022-09-16

## 2022-09-16 RX ORDER — OXYMETAZOLINE HYDROCHLORIDE 0.05 G/100ML
2 SPRAY NASAL ONCE
Status: COMPLETED | OUTPATIENT
Start: 2022-09-16 | End: 2022-09-16

## 2022-09-16 RX ADMIN — OXYMETAZOLINE HYDROCHLORIDE 2 SPRAY: 0.05 SPRAY NASAL at 14:49

## 2022-09-16 ASSESSMENT — ACTIVITIES OF DAILY LIVING (ADL)
ADLS_ACUITY_SCORE: 33
ADLS_ACUITY_SCORE: 35
ADLS_ACUITY_SCORE: 35

## 2022-09-16 ASSESSMENT — ENCOUNTER SYMPTOMS
HEADACHES: 0
FREQUENCY: 0
SHORTNESS OF BREATH: 1
DYSURIA: 0
COUGH: 1
FEVER: 1

## 2022-09-16 NOTE — ED NOTES
Patient discharged home with son. Vital signs stable at discharge. Education provided regarding avs reviewed, AMA form signed. Pt verbalized understanding. IV removed. Catheter intact. All questions answered.

## 2022-09-16 NOTE — ED TRIAGE NOTES
A&O x4, ABCs intact. Pt presents with concern for SOB and chest pain that started today. Pt reports that she has some pain under right breast as well. She states she recently delivered a baby via vaginal delivery on the 11th of this month. Pt reports intermittent headache. Pt reports that it seems like she has some bubbles in front of her eyes intermittently.        Triage Assessment     Row Name 09/16/22 1054       Triage Assessment (Adult)    Airway WDL WDL       Respiratory WDL    Respiratory WDL WDL       Cardiac WDL    Cardiac WDL X;chest pain

## 2022-09-16 NOTE — ED PROVIDER NOTES
"  History     Chief Complaint:  Shortness of Breath and Chest Pain      The history is provided by the patient and medical records.      Manuel Phillip is a 42 year old female postpartum day 4 status post term vaginal delivery with pregnancy complicated by gestational diabetes presenting with shortness of breath and chest pain.  She has had dyspnea for the last 3 days which is constant but worse with exertion.  This morning she also developed left-sided chest pain which was heavy in nature and prompted her visit.  By the time of arrival this chest pain has resolved.  She does have a very mild cough and her older child is coughing as well.  She has not had fever.  She endorses subjective fever \"at night\".  She did try albuterol without improvement and remarks this feels different than a history of asthma.  She has lower extremity edema which she had during pregnancy but thinks it might be worsening since discharge without calf pain.  She tells me she has no persistent lochia.  She has no dysuria or frequency.  She did not call her OB prior to arrival.  She is breast-feeding.    Of note, she was told her blood pressure was high during her hospitalization but she did not take any antihypertensives during pregnancy.  Discharge blood pressure was documented at 146/84.  She denies headache or vision changes.    Review of Systems   Constitutional: Positive for fever (subjective at night).   Eyes: Negative for visual disturbance.   Respiratory: Positive for cough and shortness of breath.    Cardiovascular: Positive for chest pain and leg swelling.   Genitourinary: Negative for dysuria, frequency and vaginal bleeding.   Neurological: Negative for headaches.   All other systems reviewed and are negative.    Allergies:  No Known Drug Allergies    Medications:    albuterol (PROAIR HFA/PROVENTIL HFA/VENTOLIN HFA) 108 (90 Base) MCG/ACT inhaler  docusate sodium (COLACE) 100 MG capsule  doxylamine (UNISOM) 25 MG TABS tablet  ferrous " sulfate (FEROSUL) 325 (65 Fe) MG tablet  ibuprofen (ADVIL/MOTRIN) 600 MG tablet  Prenatal Vit-Fe Fumarate-FA (PNV PRENATAL PLUS MULTIVITAMIN) 27-1 MG TABS per tablet  pyridOXINE (VITAMIN B6) 25 MG tablet    Past Medical History:    GDM    Vaginal delivery 09/14/2022     Asthma 06/09/2022     Vitamin D deficiency disease 02/27/2013     Obesity during pregnancy, delivered 03/29/2010        Past Surgical History:    Past Surgical History:   Procedure Laterality Date     CHOLECYSTECTOMY, LAPOROSCOPIC  3/09     [facility] GENITAL SURG PROC, FEMALE UNLISTED      female circumcision     Social History:  Does not smoke.  The patient presents to the ED with her son.    Physical Exam     Patient Vitals for the past 24 hrs:   BP Temp Temp src Pulse Resp SpO2   09/16/22 1053 (!) 146/74 97.9  F (36.6  C) Oral -- 18 --   09/16/22 1050 -- -- -- 75 -- 99 %       Physical Exam  General: Well-developed and well-nourished. Well appearing middle aged [place] woman. Cooperative.  Head:  Atraumatic.  Eyes:  Conjunctivae, lids, and sclerae are normal.  Neck:  Supple. Normal range of motion.  CV:  Regular rate and rhythm. Normal heart sounds with no murmurs, rubs, or gallops detected.  Resp:  No respiratory distress. Clear to auscultation bilaterally without decreased breath sounds, wheezing, rales, or rhonchi.  GI:  Soft. Non-distended. Non-tender.    MS:  Normal ROM. 2-3+ bilateral lower extremity edema.  Skin:  Warm. Non-diaphoretic. No pallor.  Neuro: Awake. A&Ox3. Normal strength.  Psych:  Normal mood and affect. Normal speech.  Vitals reviewed.    Emergency Department Course   EKG  Indication: Dyspnea  Time: 1252    Rate 86 bpm. WI interval 142. QRS duration 78. QT/QTc 394/471.   Normal sinus rhythm  Possible left atrial enlargement  Borderline ECG  No acute ST changes.  No change as compared to prior, dated 5/12/22.    Imaging:  Refused CTA chest and left AMA.    Laboratory:  Labs Ordered and Resulted from Time of ED Arrival to Time of ED  Departure   COMPREHENSIVE METABOLIC PANEL - Abnormal       Result Value    Sodium 137      Potassium 3.7      Chloride 103      Carbon Dioxide (CO2) 23      Anion Gap 11      Urea Nitrogen 6.6      Creatinine 0.58      Calcium 8.8      Glucose 98      Alkaline Phosphatase 113 (*)     AST 81 (*)     ALT 65 (*)     Protein Total 6.3 (*)     Albumin 3.3 (*)     Bilirubin Total 0.4      GFR Estimate >90     D DIMER QUANTITATIVE - Abnormal    D-Dimer Quantitative 2.80 (*)    CBC WITH PLATELETS AND DIFFERENTIAL - Abnormal    WBC Count 7.8      RBC Count 3.31 (*)     Hemoglobin 9.4 (*)     Hematocrit 30.3 (*)     MCV 92      MCH 28.4      MCHC 31.0 (*)     RDW 14.2      Platelet Count 346      % Neutrophils 59      % Lymphocytes 27      % Monocytes 8      % Eosinophils 5      % Basophils 1      % Immature Granulocytes 0      NRBCs per 100 WBC 0      Absolute Neutrophils 4.7      Absolute Lymphocytes 2.1      Absolute Monocytes 0.6      Absolute Eosinophils 0.4      Absolute Basophils 0.0      Absolute Immature Granulocytes 0.0      Absolute NRBCs 0.0     TROPONIN T, HIGH SENSITIVITY - Normal    Troponin T, High Sensitivity 14     INFLUENZA A/B & SARS-COV2 PCR MULTIPLEX - Normal    Influenza A PCR Negative      Influenza B PCR Negative      RSV PCR Negative      SARS CoV2 PCR Negative       Emergency Department Course:  Reviewed:  I reviewed nursing notes, vitals, and past medical history.    Assessments:   I obtained history and examined the patient as noted above.  1442 I rechecked the patient.  She was considering leaving AGAINST MEDICAL ADVICE because she has anxiety.  She is unable to really explain what her anxiety is regarding.  She also has nasal congestion and is hungry and tired.  I offered her anxiolytics as long as she has a safe ride home and will pump and dump her breast milk.  I will also give her Afrin and something to eat.  She ultimately acquiesced and will undergo CT pulmonary embolism study and prefers  this to the risk of leaving AGAINST MEDICAL ADVICE.  1503 Manuel has decided to leave AGAINST MEDICAL ADVICE.  She understands the risks include, but are not limited to, missing emergent, life-threatening diagnoses including pulmonary embolism, pneumothorax, or pneumonia.  She understands this can lead to respiratory arrest causing permanent disability or death.  She accepts these risks and wants to leave AGAINST MEDICAL ADVICE. RYANN Horta witnessed this discussion.    Interventions:  1449 Afrin 2 sprays nasal    Disposition:  AMA.    Impression & Plan    Medical Decision Making:  Manuel is a 42 year old woman who is 4 days postpartum presenting with shortness of breath and chest pain with dyspnea worse with exertion.  She does have lower extremity edema.  She thinks it might be increased from that of discharge although it is documented she had relatively large volume lower extremity edema at discharge.  On arrival she appears well with 2-3+ symmetric lower extremity edema.  Her lungs are clear.  She is not hypoxic.  She does have mildly hypertensive blood pressure readings with systolic in the 140s.  Her blood pressure was documented similarly mildly elevated at her discharge from the hospital.  During her emergency department course she had improvement in these blood pressures and this does not appear to be consistent with preeclampsia.      EKG is reassuring without acute ST changes or arrhythmias.  Troponin is normal and ACS is considered unlikely.  She does not describe infectious symptoms and COVID-19, influenza, and RSV are negative.  She does not have leukocytosis with baseline normocytic anemia.  There is no kidney injury or electrolyte derangement with mildly elevated transaminases with AST of 81 and ALT of 65.  D-dimer is elevated and certainly in her postpartum state she is at increased risk for pulmonary embolism.  My plan was to obtain CT pulmonary embolism study and then contact her obstetrician to  discuss initial elevated blood pressure readings, minimally elevated transaminases, and results of the CT PE.  However, she adamantly refused CT scan because she was feeling anxious.  She was unable to clearly tell me what she was feeling anxious about and also mentioned that reason she could not complete this test were nasal congestion and being hungry.  I offered her Afrin for her congestion and gave her the opportunity to eat in the emergency department.  I also offered anxiolysis to calm her and help facilitate her work-up.  She considered this but ultimately would not complete the recommended work-up and requested to leave AGAINST MEDICAL ADVICE.  She understands this means that we have not completed our testing and have not ruled out emergencies including, but not limited to, pulmonary embolism.  She understands this could lead to respiratory distress, respiratory arrest, permanent disability, and death.  She accepts these and understood these risks in English and the Gambian speaking nurse confirmed she understood the risks in Gambian as well.  She is A&Ox3 and ambulatory. She understands she can return if she changes her mind or if she has worsening and otherwise she should follow-up with her obstetrician.  All her questions were answered and she verbalized understanding.  She left AGAINST MEDICAL ADVICE understanding the risks thereof.    Covid-19  Manuel Phillip was evaluated during a global COVID-19 pandemic, which necessitated consideration that the patient might be at risk for infection with the SARS-CoV-2 virus that causes COVID-19.   Applicable protocols for evaluation were followed during the patient's care.   COVID-19 was considered as part of the patient's evaluation.    Diagnosis:    ICD-10-CM    1. Dyspnea on exertion  R06.00    2. Abnormal LFTs  R94.5        Discharge Medications:  Discharge Medication List as of 9/16/2022  2:32 PM             Leyla Boyle MD  09/24/22 [id number]

## 2022-09-16 NOTE — DISCHARGE INSTRUCTIONS
You are choosing to leave AGAINST MEDICAL ADVICE.  This means we have not completed our testing and have not ruled out emergencies including a blood clot in your lung, pneumonia, fluid on your lungs, etc.  This can lead to respiratory distress, respiratory arrest, permanent disability, and death.    You are welcome to return if you change your mind regarding work-up or you can go to another hospital if you so choose.    Please follow-up closely with your obstetrician.

## 2022-10-12 ENCOUNTER — OFFICE VISIT (OUTPATIENT)
Dept: INTERNAL MEDICINE | Facility: CLINIC | Age: 42
End: 2022-10-12
Payer: COMMERCIAL

## 2022-10-12 VITALS
WEIGHT: 253.9 LBS | HEIGHT: 71 IN | RESPIRATION RATE: 16 BRPM | TEMPERATURE: 97.9 F | SYSTOLIC BLOOD PRESSURE: 110 MMHG | OXYGEN SATURATION: 99 % | DIASTOLIC BLOOD PRESSURE: 66 MMHG | HEART RATE: 105 BPM | BODY MASS INDEX: 35.55 KG/M2

## 2022-10-12 DIAGNOSIS — D64.9 ANEMIA, UNSPECIFIED TYPE: ICD-10-CM

## 2022-10-12 DIAGNOSIS — R79.89 ABNORMAL LFTS: Primary | ICD-10-CM

## 2022-10-12 LAB
ERYTHROCYTE [DISTWIDTH] IN BLOOD BY AUTOMATED COUNT: 14.6 % (ref 10–15)
HCT VFR BLD AUTO: 36 % (ref 35–47)
HGB BLD-MCNC: 11.9 G/DL (ref 11.7–15.7)
MCH RBC QN AUTO: 28.2 PG (ref 26.5–33)
MCHC RBC AUTO-ENTMCNC: 33.1 G/DL (ref 31.5–36.5)
MCV RBC AUTO: 85 FL (ref 78–100)
PLATELET # BLD AUTO: 362 10E3/UL (ref 150–450)
RBC # BLD AUTO: 4.22 10E6/UL (ref 3.8–5.2)
WBC # BLD AUTO: 6.3 10E3/UL (ref 4–11)

## 2022-10-12 PROCEDURE — 80053 COMPREHEN METABOLIC PANEL: CPT | Performed by: NURSE PRACTITIONER

## 2022-10-12 PROCEDURE — 99203 OFFICE O/P NEW LOW 30 MIN: CPT | Performed by: NURSE PRACTITIONER

## 2022-10-12 PROCEDURE — 85027 COMPLETE CBC AUTOMATED: CPT | Performed by: NURSE PRACTITIONER

## 2022-10-12 PROCEDURE — 36415 COLL VENOUS BLD VENIPUNCTURE: CPT | Performed by: NURSE PRACTITIONER

## 2022-10-12 ASSESSMENT — ASTHMA QUESTIONNAIRES: ACT_TOTALSCORE: 20

## 2022-10-12 ASSESSMENT — PAIN SCALES - GENERAL: PAINLEVEL: NO PAIN (0)

## 2022-10-12 NOTE — PROGRESS NOTES
"  Assessment & Plan     Anemia, unspecified type    - CBC with platelets; Future    Abnormal LFTs    - Comprehensive metabolic panel (BMP + Alb, Alk Phos, ALT, AST, Total. Bili, TP); Future    F/u pending labs           BMI:   Estimated body mass index is 35.41 kg/m  as calculated from the following:    Height as of this encounter: 1.803 m (5' 11\").    Weight as of this encounter: 115.2 kg (253 lb 14.4 oz).           Jackelin Schilling NP  [facility] STACEY Jackson is a 42 year old, presenting for the following health issues:  Follow Up ([facility] ER on 09/16/22 dyspnea and abnormal LFTS)      HPI     ED/UC Followup:    Facility:  [facility]   Date of visit: 09/16/22  Reason for visit: dyspnea and abnormal LFTS  Current Status: stable, left AMA, did not have chest CT.  Denies SOB/CERVANTES/CP/LE edema.        Review of Systems   CONSTITUTIONAL: NEGATIVE for fever, chills, change in weight  ENT/MOUTH: NEGATIVE for ear, mouth and throat problems  RESP: NEGATIVE for significant cough or SOB  CV: NEGATIVE for chest pain, palpitations or peripheral edema  ROS otherwise negative      Objective    LMP 12/15/2021   /66   Pulse 105   Temp 97.9  F (36.6  C) (Oral)   Resp 16   Ht 1.803 m (5' 11\")   Wt 115.2 kg (253 lb 14.4 oz)   LMP 12/15/2021   SpO2 99%   Breastfeeding Yes   BMI 35.41 kg/m      Physical Exam   GENERAL: healthy, alert and no distress  EYES: Eyes grossly normal to inspection, PERRL and conjunctivae and sclerae normal  RESP: lungs clear to auscultation - no rales, rhonchi or wheezes  CV: regular rate and rhythm, normal S1 S2, no S3 or S4, no murmur, click or rub, no peripheral edema and peripheral pulses strong  PSYCH: mentation appears normal, affect normal/bright                    "

## 2022-10-13 LAB
ALBUMIN SERPL-MCNC: 3.3 G/DL (ref 3.4–5)
ALP SERPL-CCNC: 95 U/L (ref 40–150)
ALT SERPL W P-5'-P-CCNC: 21 U/L (ref 0–50)
ANION GAP SERPL CALCULATED.3IONS-SCNC: 9 MMOL/L (ref 3–14)
AST SERPL W P-5'-P-CCNC: 16 U/L (ref 0–45)
BILIRUB SERPL-MCNC: 0.5 MG/DL (ref 0.2–1.3)
BUN SERPL-MCNC: 15 MG/DL (ref 7–30)
CALCIUM SERPL-MCNC: 8.6 MG/DL (ref 8.5–10.1)
CHLORIDE BLD-SCNC: 106 MMOL/L (ref 94–109)
CO2 SERPL-SCNC: 23 MMOL/L (ref 20–32)
CREAT SERPL-MCNC: 0.82 MG/DL (ref 0.52–1.04)
GFR SERPL CREATININE-BSD FRML MDRD: >90 ML/MIN/1.73M2
GLUCOSE BLD-MCNC: 99 MG/DL (ref 70–99)
POTASSIUM BLD-SCNC: 4.2 MMOL/L (ref 3.4–5.3)
PROT SERPL-MCNC: 6.9 G/DL (ref 6.8–8.8)
SODIUM SERPL-SCNC: 138 MMOL/L (ref 133–144)

## 2022-11-02 PROCEDURE — 71250 CT THORAX DX C-: CPT

## 2022-11-20 ENCOUNTER — HEALTH MAINTENANCE LETTER (OUTPATIENT)
Age: 42
End: 2022-11-20

## 2022-12-07 ENCOUNTER — TELEPHONE (OUTPATIENT)
Dept: OBGYN | Facility: CLINIC | Age: 42
End: 2022-12-07

## 2022-12-07 NOTE — TELEPHONE ENCOUNTER
Reason for Call:  Other appointment    Detailed comments: requesting an appointment for a 6 week post partum. She is past the 6 weeks and first available is in February.     Phone Number Patient can be reached at: Cell number on file:    Telephone Information:   Mobile [phone]       Best Time: any     Can we leave a detailed message on this number? YES    Call taken on 12/7/2022 at 9:14 AM by Brittanie Luo

## 2022-12-15 ENCOUNTER — MEDICAL CORRESPONDENCE (OUTPATIENT)
Dept: HEALTH INFORMATION MANAGEMENT | Facility: CLINIC | Age: 42
End: 2022-12-15

## 2023-01-18 ENCOUNTER — NURSE TRIAGE (OUTPATIENT)
Dept: INTERNAL MEDICINE | Facility: CLINIC | Age: 43
End: 2023-01-18
Payer: COMMERCIAL

## 2023-01-18 NOTE — TELEPHONE ENCOUNTER
S-(situation): headache    B-(background): history of frontal headaches on and off x2 years.  Becoming more frequent.  Has not been seen for them before.    A-(assessment): Frontal headache, sensitivity to lights, nausea.  Headaches becoming more frequent. Had occurred once a year or every other year, not more frequent.  Had headache 1/13 and again today 1/18.  She has nausea, has not yet taken Tylenol.  Headaches usually resolve after Tylenol.  Has a 4 month old baby who does not sleep at night, mother not getting much sleep.  She is scheduling apptointment to see  but will go to  if symptoms do not improve.  She denies fever, numbness, tingling or weakness, neck stiffness, balance problems.  She has not vomited today.  Headaches usually last one day and 1 night.    R-(recommendations): Be seen in .  Schedule appt in  next available. HAZEL Marie R.N.        Reason for Disposition    Headache is a chronic symptom (recurrent or ongoing AND present > 4 weeks)    Additional Information    Negative: Difficult to awaken or acting confused (e.g., disoriented, slurred speech)    Negative: [1] Weakness of the face, arm or leg on one side of the body AND [2] new-onset    Negative: [1] Numbness of the face, arm or leg on one side of the body AND [2] new-onset    Negative: [1] Loss of speech or garbled speech AND [2] new-onset    Negative: Passed out (i.e., lost consciousness, collapsed and was not responding)    Negative: Sounds like a life-threatening emergency to the triager    Negative: Followed a head injury    Negative: Postpartum (from 0 to 6 weeks after delivery)    Negative: Traumatic Brain Injury (TBI) is suspected    Negative: Unable to walk, or can only walk with assistance (e.g., requires support)    Negative: Stiff neck (can't touch chin to chest)    Negative: Severe pain in one eye    Negative: [1] Other family members (or roommates) with headaches AND [2] possibility of carbon monoxide exposure     "Negative: Patient sounds very sick or weak to the triager    Negative: [1] Fever > 100.0 F (37.8 C) AND [2] diabetes mellitus or weak immune system (e.g., HIV positive, cancer chemo, splenectomy, organ transplant, chronic steroids)    Negative: Loss of vision or double vision (Exception: same as prior migraines)    Negative: [1] SEVERE headache AND [2] fever    Negative: [1] SEVERE headache AND [2] sudden-onset (i.e., reaching maximum intensity within seconds to 1 hour)    Negative: [1] SEVERE headache (e.g., excruciating) AND [2] \"worst headache\" of life    Negative: [1] SEVERE headache (e.g., excruciating) AND [2] not improved after 2 hours of pain medicine    Negative: [1] Vomiting AND [2] 2 or more times (Exception: similar to previous migraines)    Negative: Fever > 104 F (40 C)    Negative: [1] MODERATE headache (e.g., interferes with normal activities) AND [2] present > 24 hours AND [3] unexplained  (Exceptions: analgesics not tried, typical migraine, or headache part of viral illness)    Negative: [1] New headache AND [2] weak immune system (e.g., HIV positive, cancer chemo, splenectomy, organ transplant, chronic steroids)    Negative: [1] New headache AND [2] age > 50    Negative: [1] Sinus pain of forehead AND [2] yellow or green nasal discharge    Negative: Fever present > 3 days (72 hours)    Negative: [1] MILD-MODERATE headache AND [2] present > 72 hours    Negative: Headache started during exertion (e.g., sex, strenuous exercise, heavy lifting)    Answer Assessment - Initial Assessment Questions  1. LOCATION: \"Where does it hurt?\"       Forehead, usually on left side and sometimes on both  2. ONSET: \"When did the headache start?\" (Minutes, hours or days)       Onset about 10:30 a.m. today  3. PATTERN: \"Does the pain come and go, or has it been constant since it started?\"      constant  4. SEVERITY: \"How bad is the pain?\" and \"What does it keep you from doing?\"  (e.g., Scale 1-10; mild, moderate, or " "severe)    - MILD (1-3): doesn't interfere with normal activities     - MODERATE (4-7): interferes with normal activities or awakens from sleep     - SEVERE (8-10): excruciating pain, unable to do any normal activities          Rates pain currently 9/10  5. RECURRENT SYMPTOM: \"Have you ever had headaches before?\" If Yes, ask: \"When was the last time?\" and \"What happened that time?\"       Yes on and off for a couple years.  Had been then getting once a month or every other month.  Had headache 1/13 and now again 1/18  6. CAUSE: \"What do you think is causing the headache?\"      Doesn't know, worse since having baby 4 months ago.   7. MIGRAINE: \"Have you been diagnosed with migraine headaches?\" If Yes, ask: \"Is this headache similar?\"       no  8. HEAD INJURY: \"Has there been any recent injury to the head?\"       2012 cleaning garage and putting something on a shelf when a bike fell on the left side of head.  9. OTHER SYMPTOMS: \"Do you have any other symptoms?\" (fever, stiff neck, eye pain, sore throat, cold symptoms)      Nausea, blurred vision, eyes sensitive to light.  Denies sore throat, cold symptoms or stiff neck  10. PREGNANCY: \"Is there any chance you are pregnant?\" \"When was your last menstrual period?\"        Doesn't know.  LMP 3-4 weeks ago    Protocols used: HEADACHE-A-AH      "

## 2023-02-08 DIAGNOSIS — J45.909 ASTHMA, UNSPECIFIED ASTHMA SEVERITY, UNSPECIFIED WHETHER COMPLICATED, UNSPECIFIED WHETHER PERSISTENT: Primary | ICD-10-CM

## 2023-02-08 RX ORDER — ALBUTEROL SULFATE 90 UG/1
2 AEROSOL, METERED RESPIRATORY (INHALATION) EVERY 6 HOURS PRN
Qty: 18 G | Refills: 0 | Status: SHIPPED | OUTPATIENT
Start: 2023-02-08

## 2023-02-08 NOTE — TELEPHONE ENCOUNTER
Albuterol Inhaler  Routing refill request to provider for review/approval because:  Medication is reported/historical- prescribed on hospital discharge    LOV 10/2022

## 2023-02-08 NOTE — TELEPHONE ENCOUNTER
Patient with history of asthma, only has problems when she gets a cold.  States she gets short of breath and wheezing that clears easily with albuterol inhaler.

## 2023-05-09 ENCOUNTER — APPOINTMENT (OUTPATIENT)
Dept: GENERAL RADIOLOGY | Facility: CLINIC | Age: 43
End: 2023-05-09
Attending: EMERGENCY MEDICINE
Payer: COMMERCIAL

## 2023-05-09 ENCOUNTER — HOSPITAL ENCOUNTER (EMERGENCY)
Facility: CLINIC | Age: 43
Discharge: HOME | End: 2023-05-09
Attending: EMERGENCY MEDICINE | Admitting: EMERGENCY MEDICINE
Payer: COMMERCIAL

## 2023-05-09 VITALS
SYSTOLIC BLOOD PRESSURE: 122 MMHG | RESPIRATION RATE: 20 BRPM | HEART RATE: 82 BPM | DIASTOLIC BLOOD PRESSURE: 64 MMHG | OXYGEN SATURATION: 95 % | TEMPERATURE: 98.1 F

## 2023-05-09 DIAGNOSIS — R07.9 CHEST PAIN, UNSPECIFIED TYPE: ICD-10-CM

## 2023-05-09 DIAGNOSIS — R10.9 ABDOMINAL PAIN, UNSPECIFIED ABDOMINAL LOCATION: ICD-10-CM

## 2023-05-09 LAB
ALBUMIN SERPL BCG-MCNC: 4.1 G/DL (ref 3.5–5.2)
ALP SERPL-CCNC: 73 U/L (ref 35–104)
ALT SERPL W P-5'-P-CCNC: 13 U/L (ref 10–35)
ANION GAP SERPL CALCULATED.3IONS-SCNC: 9 MMOL/L (ref 7–15)
AST SERPL W P-5'-P-CCNC: 25 U/L (ref 10–35)
BASOPHILS # BLD AUTO: 0 10E3/UL (ref 0–0.2)
BASOPHILS NFR BLD AUTO: 0 %
BILIRUB DIRECT SERPL-MCNC: <0.2 MG/DL (ref 0–0.3)
BILIRUB SERPL-MCNC: 0.3 MG/DL
BUN SERPL-MCNC: 14.5 MG/DL (ref 6–20)
CALCIUM SERPL-MCNC: 9 MG/DL (ref 8.6–10)
CHLORIDE SERPL-SCNC: 105 MMOL/L (ref 98–107)
CREAT SERPL-MCNC: 0.83 MG/DL (ref 0.51–0.95)
DEPRECATED HCO3 PLAS-SCNC: 26 MMOL/L (ref 22–29)
EOSINOPHIL # BLD AUTO: 0.6 10E3/UL (ref 0–0.7)
EOSINOPHIL NFR BLD AUTO: 7 %
ERYTHROCYTE [DISTWIDTH] IN BLOOD BY AUTOMATED COUNT: 13.3 % (ref 10–15)
GFR SERPL CREATININE-BSD FRML MDRD: 90 ML/MIN/1.73M2
GLUCOSE SERPL-MCNC: 114 MG/DL (ref 70–99)
HCT VFR BLD AUTO: 34.2 % (ref 35–47)
HGB BLD-MCNC: 11.5 G/DL (ref 11.7–15.7)
HOLD SPECIMEN: NORMAL
HOLD SPECIMEN: NORMAL
IMM GRANULOCYTES # BLD: 0 10E3/UL
IMM GRANULOCYTES NFR BLD: 0 %
LIPASE SERPL-CCNC: 27 U/L (ref 13–60)
LYMPHOCYTES # BLD AUTO: 3.5 10E3/UL (ref 0.8–5.3)
LYMPHOCYTES NFR BLD AUTO: 41 %
MCH RBC QN AUTO: 30.7 PG (ref 26.5–33)
MCHC RBC AUTO-ENTMCNC: 33.6 G/DL (ref 31.5–36.5)
MCV RBC AUTO: 91 FL (ref 78–100)
MONOCYTES # BLD AUTO: 0.6 10E3/UL (ref 0–1.3)
MONOCYTES NFR BLD AUTO: 6 %
NEUTROPHILS # BLD AUTO: 3.9 10E3/UL (ref 1.6–8.3)
NEUTROPHILS NFR BLD AUTO: 46 %
NRBC # BLD AUTO: 0 10E3/UL
NRBC BLD AUTO-RTO: 0 /100
PLATELET # BLD AUTO: 330 10E3/UL (ref 150–450)
POTASSIUM SERPL-SCNC: 3.9 MMOL/L (ref 3.4–5.3)
PROT SERPL-MCNC: 7.3 G/DL (ref 6.4–8.3)
RBC # BLD AUTO: 3.75 10E6/UL (ref 3.8–5.2)
SODIUM SERPL-SCNC: 140 MMOL/L (ref 136–145)
TROPONIN T SERPL HS-MCNC: <6 NG/L
WBC # BLD AUTO: 8.6 10E3/UL (ref 4–11)

## 2023-05-09 PROCEDURE — 84484 ASSAY OF TROPONIN QUANT: CPT | Performed by: EMERGENCY MEDICINE

## 2023-05-09 PROCEDURE — 82248 BILIRUBIN DIRECT: CPT | Performed by: EMERGENCY MEDICINE

## 2023-05-09 PROCEDURE — 85025 COMPLETE CBC W/AUTO DIFF WBC: CPT | Performed by: EMERGENCY MEDICINE

## 2023-05-09 PROCEDURE — 93005 ELECTROCARDIOGRAM TRACING: CPT

## 2023-05-09 PROCEDURE — 99285 EMERGENCY DEPT VISIT HI MDM: CPT | Mod: 25

## 2023-05-09 PROCEDURE — 82310 ASSAY OF CALCIUM: CPT | Performed by: EMERGENCY MEDICINE

## 2023-05-09 PROCEDURE — 36415 COLL VENOUS BLD VENIPUNCTURE: CPT | Performed by: EMERGENCY MEDICINE

## 2023-05-09 PROCEDURE — 71046 X-RAY EXAM CHEST 2 VIEWS: CPT

## 2023-05-09 PROCEDURE — 83690 ASSAY OF LIPASE: CPT | Performed by: EMERGENCY MEDICINE

## 2023-05-09 PROCEDURE — 80048 BASIC METABOLIC PNL TOTAL CA: CPT | Performed by: EMERGENCY MEDICINE

## 2023-05-09 PROCEDURE — 80053 COMPREHEN METABOLIC PANEL: CPT | Performed by: EMERGENCY MEDICINE

## 2023-05-09 ASSESSMENT — ENCOUNTER SYMPTOMS
CHILLS: 0
FEVER: 0
SORE THROAT: 0
VOMITING: 0
SHORTNESS OF BREATH: 0
HEMATURIA: 0
DIZZINESS: 0
NECK STIFFNESS: 0
ABDOMINAL PAIN: 0
DIARRHEA: 0
COUGH: 0
BACK PAIN: 1
ARTHRALGIAS: 0
MYALGIAS: 0
RHINORRHEA: 0
HEADACHES: 0
EYE REDNESS: 0
DYSURIA: 0
FATIGUE: 0
NAUSEA: 0

## 2023-05-09 ASSESSMENT — ACTIVITIES OF DAILY LIVING (ADL): ADLS_ACUITY_SCORE: 33

## 2023-05-09 NOTE — ED TRIAGE NOTES
"Feels a mid sternal and right side of \"lung\" pain when she takes a deep breath. \"I can feel something moving in my chest\"      "

## 2023-05-10 LAB
ATRIAL RATE - MUSE: 80 BPM
DIASTOLIC BLOOD PRESSURE - MUSE: NORMAL MMHG
INTERPRETATION ECG - MUSE: NORMAL
P AXIS - MUSE: 60 DEGREES
PR INTERVAL - MUSE: 150 MS
QRS DURATION - MUSE: 84 MS
QT - MUSE: 388 MS
QTC - MUSE: 447 MS
R AXIS - MUSE: 42 DEGREES
SYSTOLIC BLOOD PRESSURE - MUSE: NORMAL MMHG
T AXIS - MUSE: 18 DEGREES
VENTRICULAR RATE- MUSE: 80 BPM

## 2023-05-10 NOTE — ED PROVIDER NOTES
History     Chief Complaint:  Breathing Problem       The history is provided by the patient.      Manuel Phillip is a 43 year old female with a history of asthma who presents with mid to right-sided chest pain that radiates to the back since 3 days ago.      Independent Historian:   None - Patient Only    Review of External Notes:      ROS:  Review of Systems   Constitutional: Negative for chills, fatigue and fever.   HENT: Negative for congestion, ear pain, rhinorrhea and sore throat.    Eyes: Negative for redness.   Respiratory: Negative for cough and shortness of breath.    Cardiovascular: Positive for chest pain.   Gastrointestinal: Negative for abdominal pain, diarrhea, nausea and vomiting.   Genitourinary: Negative for dysuria and hematuria.   Musculoskeletal: Positive for back pain. Negative for arthralgias, myalgias and neck stiffness.   Skin: Negative for rash.   Neurological: Negative for dizziness and headaches.   All other systems reviewed and are negative.      Allergies:  No known allergies     Medications:    Albuterol     Past Medical History:    Asthma  Gestation diabetes    Past Surgical History:    Cholecystectomy  Female circumcision     Social History:  The patient presents with her     Physical Exam     Patient Vitals for the past 24 hrs:   BP Temp Temp src Pulse Resp SpO2   05/09/23 1933 -- -- -- -- -- 96 %   05/09/23 1924 -- -- -- -- -- 96 %   05/09/23 1918 -- -- -- -- -- 92 %   05/09/23 1917 122/64 -- -- 82 -- --   05/09/23 1626 124/77 98.1  F (36.7  C) Temporal 86 20 99 %        Physical Exam  Constitutional: Patient is well appearing. No distress.  Head: Atraumatic.  Mouth/Throat: Oropharynx is clear and moist. No oropharyngeal exudate.  Eyes: Conjunctivae and EOM are normal. No scleral icterus.  Neck: Normal range of motion. Neck supple.   Cardiovascular: Normal rate, regular rhythm, normal heart sounds and intact distal perfusion.   Pulmonary/Chest: Breath sounds normal. No  respiratory distress.  Abdominal: Soft. Bowel sounds are normal. No distension. No tenderness. No rebound or guarding.   Musculoskeletal: Normal range of motion. No edema or tenderness.   Neurological: Alert and orientated to person, place, and time. No observable focal neuro deficit  Skin: Warm and dry. No rash noted. Not diaphoretic.     Emergency Department Course   ECG  ECG taken at 1653, ECG read  Sinus rhythm   Normal ECG  Rate 80 bpm. ID interval 150 ms. QRS duration 84 ms. QT/QTc 388/447 ms. P-R-T axes 60 42 18.     Imaging:  XR Chest 2 Views   Final Result   IMPRESSION: Heart is normal in size. Lungs are clear.         Report per radiology    Laboratory:  Labs Ordered and Resulted from Time of ED Arrival to Time of ED Departure   BASIC METABOLIC PANEL - Abnormal       Result Value    Sodium 140      Potassium 3.9      Chloride 105      Carbon Dioxide (CO2) 26      Anion Gap 9      Urea Nitrogen 14.5      Creatinine 0.83      Calcium 9.0      Glucose 114 (*)     GFR Estimate 90     CBC WITH PLATELETS AND DIFFERENTIAL - Abnormal    WBC Count 8.6      RBC Count 3.75 (*)     Hemoglobin 11.5 (*)     Hematocrit 34.2 (*)     MCV 91      MCH 30.7      MCHC 33.6      RDW 13.3      Platelet Count 330      % Neutrophils 46      % Lymphocytes 41      % Monocytes 6      % Eosinophils 7      % Basophils 0      % Immature Granulocytes 0      NRBCs per 100 WBC 0      Absolute Neutrophils 3.9      Absolute Lymphocytes 3.5      Absolute Monocytes 0.6      Absolute Eosinophils 0.6      Absolute Basophils 0.0      Absolute Immature Granulocytes 0.0      Absolute NRBCs 0.0     TROPONIN T, HIGH SENSITIVITY - Normal    Troponin T, High Sensitivity <6     HEPATIC FUNCTION PANEL - Normal    Protein Total 7.3      Albumin 4.1      Bilirubin Total 0.3      Alkaline Phosphatase 73      AST 25      ALT 13      Bilirubin Direct <0.20     LIPASE - Normal    Lipase 27         Emergency Department Course &  Assessments:    Interventions:  Medications - No data to display     Assessments:  1920 Initial assessment. I gathered history and examined the patient as noted above.   2018 I rechecked the patient and explained findings.     Independent Interpretation (X-rays, CTs, rhythm strip):      Social Determinants of Health affecting care:   None    Disposition:  The patient was discharged to home.     Impression & Plan      Medical Decision Making:  This patient presents with chest pain.  The work up in the Emergency Department is negative.  The differential diagnosis of chest pain is broad and includes life threatening etiologies such as Acute coronary syndrome, Myocardial infarction, Pulmonary Embolism, Acute Aortic Dissection, amongst others.  Other causes may include pneumonia, pneumothorax, pericarditis, pleurisy, esophageal spasm.  No serious etiology for the chest pain were detected today during this visit.      Close follow up with primary care is indicated should the pain continue, as further work up may be performed; this was made clear to the patient, who understands.    Diagnosis:    ICD-10-CM    1. Chest pain, unspecified type  R07.9       2. Abdominal pain, unspecified abdominal location  R10.9            Discharge Medications:  New Prescriptions    No medications on file          Scribe Disclosure:  I, Marlakaylin Jewell, am serving as a scribe at 7:25 PM on 5/9/2023 to document services personally performed by Charlie De La Rosa MD based on my observations and the provider's statements to me.     5/9/2023   Charlie De La Rosa MD Stevens, Andrew C, MD  05/09/23 [id number]

## 2023-09-16 ENCOUNTER — HEALTH MAINTENANCE LETTER (OUTPATIENT)
Age: 43
End: 2023-09-16

## 2023-12-27 NOTE — LETTER
7/29/2022         RE: Manuel Phillip  [id number] [facility] [id number]        Dear Colleague,    Thank you for referring your patient, Manuel Phillip, to the [facility]. Please see a copy of my visit note below.    Called patient for scheduled GDM follow up. Pt reported she forgot about her appt, and states she would call back later today. Pt has not been able to report home glucose readings as of yet (1 hr gluc screening test was 203 mg/dl on 6/23, initial GDM Ed appt was 7/19). Reminded pt it was extremely important that we discuss progress asap.     Anastasia Chavira RD, [facility]  Diabetes   No charge encounter         Lab Results   Component Value Date    HGBA1C 10.4 (H) 12/20/2023     COMPLIANT WITH MEDICATION  DENIES ANY NUMBNESS, TINGLING IN FEET    - DISCUSSED DIETARY MODIFICATION OF CARBOHYDRATES  - HGB A1C AND URINE STUDIES DUE TODAY  - FOOT CARE  - RV 3 MONTHS

## 2024-02-03 ENCOUNTER — HEALTH MAINTENANCE LETTER (OUTPATIENT)
Age: 44
End: 2024-02-03

## 2024-07-08 ENCOUNTER — HOSPITAL ENCOUNTER (EMERGENCY)
Facility: CLINIC | Age: 44
Discharge: HOME | End: 2024-07-08
Admitting: EMERGENCY MEDICINE
Payer: COMMERCIAL

## 2024-07-08 VITALS
DIASTOLIC BLOOD PRESSURE: 69 MMHG | TEMPERATURE: 98.1 F | HEART RATE: 83 BPM | BODY MASS INDEX: 35.7 KG/M2 | SYSTOLIC BLOOD PRESSURE: 121 MMHG | RESPIRATION RATE: 18 BRPM | WEIGHT: 255.95 LBS | OXYGEN SATURATION: 99 %

## 2024-07-08 PROCEDURE — 99281 EMR DPT VST MAYX REQ PHY/QHP: CPT

## 2024-07-08 ASSESSMENT — COLUMBIA-SUICIDE SEVERITY RATING SCALE - C-SSRS
2. HAVE YOU ACTUALLY HAD ANY THOUGHTS OF KILLING YOURSELF IN THE PAST MONTH?: NO
6. HAVE YOU EVER DONE ANYTHING, STARTED TO DO ANYTHING, OR PREPARED TO DO ANYTHING TO END YOUR LIFE?: NO
1. IN THE PAST MONTH, HAVE YOU WISHED YOU WERE DEAD OR WISHED YOU COULD GO TO SLEEP AND NOT WAKE UP?: NO

## 2024-07-08 NOTE — ED TRIAGE NOTES
IVF transfer in [facility] on 6/7/2024. Pt has hydroxyprogesterone injection from [facility], requesting ER staff to give injection. Pt has not set up initial OB appt yet.

## 2024-07-16 ENCOUNTER — VIRTUAL VISIT (OUTPATIENT)
Dept: OBGYN | Facility: CLINIC | Age: 44
End: 2024-07-16
Payer: COMMERCIAL

## 2024-07-16 VITALS — BODY MASS INDEX: 33.6 KG/M2 | WEIGHT: 240 LBS | HEIGHT: 71 IN

## 2024-07-16 DIAGNOSIS — Z34.80 PRENATAL CARE, SUBSEQUENT PREGNANCY, UNSPECIFIED TRIMESTER: Primary | ICD-10-CM

## 2024-07-16 PROCEDURE — 99207 PR NO CHARGE NURSE ONLY: CPT | Mod: 93

## 2024-07-16 RX ORDER — ENOXAPARIN SODIUM 100 MG/ML
40 INJECTION SUBCUTANEOUS DAILY
COMMUNITY

## 2024-07-16 NOTE — Clinical Note
It was my understanding that these meds are from [facility] and she is taking them under the direction form those docs. She was not requesting any Rx from us at the time of the intake call.   Lorri DE LA CRUZ RN OB/GYN [place]

## 2024-07-16 NOTE — PATIENT INSTRUCTIONS
Learning About Pregnancy  Your Care Instructions     Your health in the early weeks of your pregnancy is particularly important for your baby's health. Take good care of yourself. Anything you do that harms your body can also harm your baby.  Make sure to go to all of your doctor appointments. Regular checkups will help keep you and your baby healthy.  How can you care for yourself at home?  Diet    Choose healthy foods like fruits, vegetables, whole grains, lean proteins, and healthy fats.     Choose foods that are good sources of calcium, iron, and folate. You can try dairy products, dark leafy greens, fortified orange juice and cereals, almonds, broccoli, dried fruit, and beans.     Do not skip meals or go for many hours without eating. If you are nauseated, try to eat a small, healthy snack every 2 to 3 hours.     Avoid fish that are high in mercury. These include shark, swordfish, lillie mackerel, marlin, orange roughy, and bigeye tuna, as well as tilefish from the [facility].     It's okay to eat up to 8 to 12 ounces a week of fish that are low in mercury or up to 4 ounces a week of fish that have medium levels of mercury. Some fish that are low in mercury are salmon, shrimp, canned light tuna, cod, and tilapia. Some fish that have medium levels of mercury are halibut and white albacore tuna.     Drink plenty of fluids. If you have kidney, heart, or liver disease and have to limit fluids, talk with your doctor before you increase the amount of fluids you drink.     Limit caffeine to about 200 to 300 mg per day. On average, a cup of brewed coffee has around 80 to 100 mg of caffeine.     Do not drink alcohol, such as beer, wine, or hard liquor.     Take a multivitamin that contains at least 400 micrograms (mcg) of folic acid to help prevent birth defects. Fortified cereal and whole wheat bread are good additional sources of folic acid.     Increase the calcium in your diet. Try to drink a quart of skim milk  each day. You may also take calcium supplements and choose foods such as cheese and yogurt.   Lifestyle    Make sure you go to your follow-up appointments.     Get plenty of rest. You may be unusually tired while you are pregnant.     Get at least 30 minutes of exercise on most days of the week. Walking is a good choice. If you have not exercised in the past, start out slowly. Take several short walks each day.     Do not smoke. If you need help quitting, talk to your doctor about stop-smoking programs. These can increase your chances of quitting for good.     Do not touch cat feces or litter boxes. Also, wash your hands after you handle raw meat, and fully cook all meat before you eat it. Wear gloves when you work in the yard or garden, and wash your hands well when you are done. Cat feces, raw or undercooked meat, and contaminated dirt can cause an infection that may harm your baby or lead to a miscarriage.     Avoid things that can make your body too hot and may be harmful to your baby, such as a hot tub or sauna. Or talk with your doctor before doing anything that raises your body temperature. Your doctor can tell you if it's safe.     Avoid chemical fumes, paint fumes, or poisons.     Do not use illegal drugs, marijuana, or alcohol.   Medicines    Review all of your medicines with your doctor. Some of your routine medicines may need to be changed to protect your baby.     Use acetaminophen (Tylenol) to relieve minor problems, such as a mild headache or backache or a mild fever with cold symptoms. Do not use nonsteroidal anti-inflammatory drugs (NSAIDs), such as ibuprofen (Advil, Motrin) or naproxen (Aleve), unless your doctor says it is okay.     Do not take two or more pain medicines at the same time unless the doctor told you to. Many pain medicines have acetaminophen, which is Tylenol. Too much acetaminophen (Tylenol) can be harmful.     Take your medicines exactly as prescribed. Call your doctor if you  "think you are having a problem with your medicine.   To manage morning sickness    Keep food in your stomach, but not too much at once. Try eating five or six small meals a day instead of three large meals.     For nausea when you wake up, eat a small snack, such as a couple of crackers or pretzels, before rising. Allow a few minutes for your stomach to settle before you slowly get up.     Try to avoid smells and foods that make you feel nauseated. High-fat or greasy foods, milk, and coffee may make nausea worse. Some foods that may be easier to tolerate include cold, spicy, sour, and salty foods.     Drink enough fluids. Water and other caffeine-free drinks are good choices.     Take your prenatal vitamins at night on a full stomach.     Try foods and drinks made with liliam. Liliam may help with nausea.     Get lots of rest. Morning sickness may be worse when you are tired.     Talk to your doctor about over-the-counter products, such as vitamin B6 or doxylamine, to help relieve symptoms.     Try a P6 acupressure wrist band. These anti-nausea wristbands help some people.   Follow-up care is a key part of your treatment and safety. Be sure to make and go to all appointments, and call your doctor if you are having problems. It's also a good idea to know your test results and keep a list of the medicines you take.  Where can you learn more?  Go to [email or web address]://[email or web address]/[email or web address]  Enter E868 in the search box to learn more about \"Learning About Pregnancy.\"  Current as of: July 10, 2023               Content Version: 14.0    1176-7492 [employer].   Care instructions adapted under license by your healthcare professional. If you have questions about a medical condition or this instruction, always ask your healthcare professional. [employer] disclaims any warranty or liability for your use of this information.      Weeks 6 to 10 of Your Pregnancy: Care Instructions  During these weeks of " "pregnancy, your body goes through many changes. You may start to feel different, both in your body and your emotions. Each pregnancy is different, so there's no \"right\" way to feel. These early weeks are a time to make healthy choices for you and your pregnancy.    Take a daily prenatal vitamin. Choose one with folic acid in it.    Avoid alcohol, tobacco, and drugs (including marijuana). If you need help quitting, talk to your doctor.    Drink plenty of liquids.  Be sure to drink enough water. And limit sodas, other sweetened drinks, and caffeine.     Choose foods that are good sources of calcium, iron, and folate.  You can try dairy products, dark leafy greens, fortified orange juice and cereals, almonds, broccoli, dried fruit, and beans.     Avoid foods that may be harmful.  Don't eat raw meat, deli meat, raw seafood, or raw eggs. Avoid soft cheese and unpasteurized dairy, like Brie and blue cheese. And don't eat fish that contains a lot of mercury, like shark and swordfish.     Don't touch raven litter or cat poop.  They can cause an infection that could be harmful during pregnancy.     Avoid things that can make your body too hot.  For example, avoid hot tubs and saunas.     Soothe morning sickness.  Try eating 5 or 6 small meals a day, getting some fresh air, or using patricia to control symptoms.     Ask your doctor about flu and COVID-19 shots.  Getting them can help protect against infection.   Follow-up care is a key part of your treatment and safety. Be sure to make and go to all appointments, and call your doctor if you are having problems. It's also a good idea to know your test results and keep a list of the medicines you take.  Where can you learn more?  Go to [email or web address]://[email or web address]/patiented  Enter G112 in the search box to learn more about \"Weeks 6 to 10 of Your Pregnancy: Care Instructions.\"  Current as of: July 10, 2023               Content Version: 14.0    7512-1663 [employer]. "   Care instructions adapted under license by your healthcare professional. If you have questions about a medical condition or this instruction, always ask your healthcare professional. [employer] disclaims any warranty or liability for your use of this information.         Managing Morning Sickness (01:55)  Your health professional recommends that you watch this short online health video.  Learn tips for dealing with morning sickness, no matter what time of day you have it.  Purpose:  Gives tips for managing morning sickness, including eating small low-fat meals and avoiding caffeine and spicy food.  Goal:  The user will learn tips for dealing with morning sickness during pregnancy.     How to watch the video    Scan the QR code   OR Visit the website    [email or web address]   Current as of: July 10, 2023               Content Version: 14.0    5036-1551 [employer].   Care instructions adapted under license by your healthcare professional. If you have questions about a medical condition or this instruction, always ask your healthcare professional. [employer] disclaims any warranty or liability for your use of this information.      Pregnancy and Heartburn: Care Instructions  Overview     Heartburn is a common problem during pregnancy.  Heartburn happens when stomach acid backs up into the tube that carries food to the stomach. This tube is called the esophagus. Early in pregnancy, heartburn is caused by hormone changes that slow down digestion. Later on, it's also caused by the large uterus pushing up on the stomach.  Even though you can't fix the cause, there are things you can do to get relief. Treating heartburn during pregnancy focuses first on making lifestyle changes, like changing what and how you eat, and on taking medicines.  Heartburn usually improves or goes away after childbirth.  Follow-up care is a key part of your treatment and safety. Be sure to make  "and go to all appointments, and call your doctor if you are having problems. It's also a good idea to know your test results and keep a list of the medicines you take.  How can you care for yourself at home?  Eat small, frequent meals.  Avoid foods that make your symptoms worse, such as chocolate, peppermint, and spicy foods. Avoid drinks with caffeine, such as coffee, tea, and sodas.  Avoid bending over or lying down after meals.  Take a short walk after you eat.  If heartburn is a problem at night, do not eat for 2 hours before bedtime.  Take antacids like Mylanta, Maalox, Rolaids, or Tums. Do not take antacids that have sodium bicarbonate, magnesium trisilicate, or aspirin. Be careful when you take over-the-counter antacid medicines. Many of these medicines have aspirin in them. While you are pregnant, do not take aspirin or medicines that contain aspirin unless your doctor says it is okay.  If you're not getting relief, talk to your doctor. You may be able to take a stronger acid-reducing medicine.  When should you call for help?   Call your doctor now or seek immediate medical care if:    You have new or worse belly pain.     You are vomiting.   Watch closely for changes in your health, and be sure to contact your doctor if:    You have new or worse symptoms of reflux.     You are losing weight.     You have trouble or pain swallowing.     You do not get better as expected.   Where can you learn more?  Go to https://www.[employer].net/patiented  Enter U946 in the search box to learn more about \"Pregnancy and Heartburn: Care Instructions.\"  Current as of: July 10, 2023               Content Version: 14.0    3939-2299 [employer].   Care instructions adapted under license by your healthcare professional. If you have questions about a medical condition or this instruction, always ask your healthcare professional. [employer] disclaims any warranty or liability for your use of this " information.      Constipation: Care Instructions  Overview     Constipation means that you have a hard time passing stools (bowel movements). People pass stools from 3 times a day to once every 3 days. What is normal for you may be different. Constipation may occur with pain in the rectum and cramping. The pain may get worse when you try to pass stools. Sometimes there are small amounts of bright red blood on toilet paper or the surface of stools. This is because of enlarged veins near the rectum (hemorrhoids).  A few changes in your diet and lifestyle may help you avoid ongoing constipation. Your doctor may also prescribe medicine to help loosen your stool.  Some medicines can cause constipation. These include pain medicines and antidepressants. Tell your doctor about all the medicines you take. Your doctor may want to make a medicine change to ease your symptoms.  Follow-up care is a key part of your treatment and safety. Be sure to make and go to all appointments, and call your doctor if you are having problems. It's also a good idea to know your test results and keep a list of the medicines you take.  How can you care for yourself at home?  Drink plenty of fluids. If you have kidney, heart, or liver disease and have to limit fluids, talk with your doctor before you increase the amount of fluids you drink.  Include high-fiber foods in your diet each day. These include fruits, vegetables, beans, and whole grains.  Get at least 30 minutes of exercise on most days of the week. Walking is a good choice. You also may want to do other activities, such as running, swimming, cycling, or playing tennis or team sports.  Take a fiber supplement, such as Citrucel or Metamucil, every day. Read and follow all instructions on the label.  Schedule time each day for a bowel movement. A daily routine may help. Take your time having a bowel movement, but don't sit for more than 10 minutes at a time. And don't strain too  "much.  Support your feet with a small step stool when you sit on the toilet. This helps flex your hips and places your pelvis in a squatting position.  Your doctor may recommend an over-the-counter laxative to relieve your constipation. Examples are Milk of Magnesia and MiraLax. Read and follow all instructions on the label. Do not use laxatives on a long-term basis.  When should you call for help?   Call your doctor now or seek immediate medical care if:    You have new or worse belly pain.     You have new or worse nausea or vomiting.     You have blood in your stools.   Watch closely for changes in your health, and be sure to contact your doctor if:    Your constipation is getting worse.     You do not get better as expected.   Where can you learn more?  Go to [email or web address]/[email or web address]  Enter P343 in the search box to learn more about \"Constipation: Care Instructions.\"  Current as of: October 19, 2023               Content Version: 14.0    7746-8709 [employer].   Care instructions adapted under license by your healthcare professional. If you have questions about a medical condition or this instruction, always ask your healthcare professional. [employer] disclaims any warranty or liability for your use of this information.      Learning About High-Iron Foods  What foods are high in iron?     The foods you eat contain nutrients, such as vitamins and minerals. Iron is a nutrient. Your body needs the right amount to stay healthy and work as it should. You can use the list below to help you make choices about which foods to eat.  Here are some foods that contain iron. They have 1 to 2 milligrams of iron per serving.  Fruits  Figs (dried), 5 figs  Vegetables  Asparagus (canned), 6 jay  Debo, beet, Swiss chard, or turnip greens, 1 cup  Dried peas, cooked,   cup  Seaweed, spirulina (dried),   cup  Spinach, (cooked)   cup or (raw) 1 cup  Grains  Cereals, fortified with iron, 1 " "cup  Grits (instant, cooked), fortified with iron,   cup  Meats and other protein foods  Beans (kidney, lima, navy, white), canned or cooked,   cup  Beef or lamb, 3 oz  Chicken giblets, 3 oz  Chickpeas (garbanzo beans),   cup  Liver of beef, lamb, or pork, 3 oz  Oysters (cooked), 3 oz  Sardines (canned), 3 oz  Soybeans (boiled),   cup  Tofu (firm),   cup  Work with your doctor to find out how much of this nutrient you need. Depending on your health, you may need more or less of it in your diet.  Where can you learn more?  Go to https://www.[employer].net/patiented  Enter R005 in the search box to learn more about \"Learning About High-Iron Foods.\"  Current as of: September 20, 2023               Content Version: 14.0    4658-8627 [employer].   Care instructions adapted under license by your healthcare professional. If you have questions about a medical condition or this instruction, always ask your healthcare professional. [employer] disclaims any warranty or liability for your use of this information.      Learning About Fetal Ultrasound Results  What is a fetal ultrasound?     Fetal ultrasound is a test that lets your doctor see an image of your baby. Your doctor learns information about your baby from this picture. You may find out, for example, if you are having a boy or a girl. But the main reason you have this test is to get information about your baby's health.  (You may hear your baby called a fetus. This is a common medical term for a baby that's growing in the mother's uterus.)  What kind of information can you learn from this test?  The findings of an ultrasound fall into two categories, normal and abnormal.  Normal  The fetus is the right size for its age.  The placenta is the expected size and does not cover the cervix.  There is enough amniotic fluid in the uterus.  No birth defects can be seen.  Abnormal  The fetus is small or large for its age.  The placenta covers the " cervix.  There is too much or too little amniotic fluid in the uterus.  The fetus may have a birth defect.  What does an abnormal result mean?  Abnormal seems to imply that something is wrong with your baby. But what it means is that the test has shown something the doctor wants to take a closer look at.  And that's what happens next. Your doctor will talk to you about what further test or tests you may need.  What do the results mean?  Some of the things your doctor may see on an abnormal ultrasound include:  Echogenic bowel.  The bowel looks very bright on the screen. This could mean that there's blood in the bowel. Or it could mean that something is blocking the small bowel.  Increased nuchal translucency.  The ultrasound measures the thickness at the back of the baby's neck. An increase in thickness is sometimes an early sign of Down syndrome.  Increased or decreased amniotic fluid.  The doctor will look for a reason for the level of amniotic fluid and will watch the pregnancy closely as it progresses.  Large ventricles.  Ventricles in the brain look larger than they should. Your doctor may take a closer look at the brain.  Renal pyelectasis/hydronephrosis.  The ultrasound measures the fluid around the kidney. If there is more fluid than expected, there is a chance of urinary tract or kidney problems.  Short long bones.  The ultrasound measures certain arm and leg bones. A long bone (humerus or femur) that is shorter than average could be a sign of Down syndrome.  Subchorionic hemorrhage.  An ultrasound can show bleeding under one of the membranes that surrounds the fetus. Some women don't have symptoms of bleeding. The ultrasound can find this problem when women are not bleeding from their vagina. Women who have this condition have a slightly higher chance of miscarriage.  What do you do now?  Take a deep breath, and let it out. Keep in mind that an abnormal finding on an ultrasound, after it's coupled with  "more information, may:  Turn out to be nothing.  Turn out to be something mild that won't affect the baby.  Turn out to be something more serious. But if this happens, early diagnosis helps you and your doctor plan treatment options sooner rather than later.  Your medical team is there for you. So are your family and friends. Ask questions, and get the help and support you need.  Follow-up care is a key part of your treatment and safety. Be sure to make and go to all appointments, and call your doctor if you are having problems. It's also a good idea to know your test results and keep a list of the medicines you take.  Where can you learn more?  Go to [email or web address]/[email or web address]  Enter K451 in the search box to learn more about \"Learning About Fetal Ultrasound Results.\"  Current as of: July 10, 2023               Content Version: 14.0    2688-3233 [employer].   Care instructions adapted under license by your healthcare professional. If you have questions about a medical condition or this instruction, always ask your healthcare professional. [employer] disclaims any warranty or liability for your use of this information.      Learning About Prenatal Visits  Overview     Regular prenatal visits are very important during any pregnancy. These quick office visits may seem simple and routine. But they can help you have a safe and healthy pregnancy. Your doctor is watching for problems that can only be found through regular checkups. The visits also give you and your doctor time to build a good relationship.  After your first visit, you will most likely start on a schedule of monthly visits. In your third trimester, the visits will get more frequent. Based on your health, your age, and if you've had a normal, full-term pregnancy before, your doctor may want to see you more or less often.  At different times in your pregnancy, you will have exams and tests. Some are routine. Others are " done only when there is a chance of a problem. Everything healthy you do for your body helps you have a healthy pregnancy. Rest when you need it. Eat well, drink plenty of water, and exercise regularly.  What happens during a prenatal visit?  You will have blood pressure checks, along with urine tests. You also may have blood tests. If you need to go to the bathroom while waiting for the doctor, tell the nurse. You will be given a sample cup so your urine can be tested.  You will be weighed and have your belly measured.  Your doctor may listen to the fetal heartbeat with a special device.  At about 24 weeks, and possibly earlier in your pregnancy, your doctor will check your blood sugar (glucose tolerance test) for diabetes that can occur during pregnancy. This is gestational diabetes, which can be harmful.  You will have tests to check for infections that could harm your . These include group B streptococcus and hepatitis B.  Your doctor may do ultrasounds to check for problems. This also checks the position of the fetus. An ultrasound uses sound waves to produce a picture of the fetus.  You may get your vaccines updated.  Your doctor may ask you questions to check for signs of anxiety or depression. Tell your doctor if you feel sad, anxious, or hopeless for more than a few days.  You may have other tests at any time during your pregnancy.  Use your visits to discuss with your doctor any concerns you have.  How can you care for yourself at home?  Get plenty of rest.  Try to exercise every day, if your doctor says it is okay. If you have not exercised in the past, start out slowly. For example, you can take short walks each day.  Choose healthy foods, such as fruits, vegetables, whole grains, lean proteins, low-fat dairy, and healthy fats.  Drink plenty of fluids. Cut down on drinks with caffeine, such as coffee, tea, and cola. If you have kidney, heart, or liver disease and have to limit fluids, talk with  "your doctor before you increase the amount of fluids you drink.  Try to avoid chemical fumes, paint fumes, and poisons.  If you smoke, vape, or use alcohol, marijuana, or other drugs, quit or cut back as much as you can. Talk to your doctor if you need help quitting.  Review all of your medicines, including over-the-counter medicines and supplements, with your doctor. Some of your routine medicines may need to be changed. Do not stop or start taking any medicines without talking to your doctor first.  Follow-up care is a key part of your treatment and safety. Be sure to make and go to all appointments, and call your doctor if you are having problems. It's also a good idea to know your test results and keep a list of the medicines you take.  Where can you learn more?  Go to https://www.[employer].net/patiented  Enter J502 in the search box to learn more about \"Learning About Prenatal Visits.\"  Current as of: July 10, 2023               Content Version: 14.0    1710-5308 [employer].   Care instructions adapted under license by your healthcare professional. If you have questions about a medical condition or this instruction, always ask your healthcare professional. [employer] disclaims any warranty or liability for your use of this information.      Intimate Partner Violence: Care Instructions  Overview     If you want to save this information but don't think it is safe to take it home, see if a trusted friend can keep it for you. Plan ahead. Know who you can call for help, and memorize the phone number.   Be careful online too. Your online activity may be seen by others. Do not use your personal computer or device to read about this topic. Use a safe computer, such as one at work, a friend's home, or a library.    Intimate partner violence--a type of domestic abuse--is different from an argument now and then. It is a pattern of abuse that one person may use to control another person's " behavior. It may start with threats and name-calling. Then, it may lead to more serious acts, like pushing and slapping. The abuse also may occur in other areas. For example, the abuser may withhold money or spend a partner's money without their knowledge.  Abuse can cause serious harm. You are more likely to have a long-term health problem from the injuries and stress of living in a violent relationship. People who are sexually abused by their partners have more sexually transmitted infections and unplanned pregnancies. Anyone who is abused also faces emotional pain. Anyone can be abused in relationships. In some relationships, both people use abusive behavior.  If you are pregnant, abuse can cause problems such as poor weight gain, infections, and bleeding. Abuse during this time may increase your baby's risk of low birth weight, premature birth, and death.  Follow-up care is a key part of your treatment and safety. Be sure to make and go to all appointments, and call your doctor if you are having problems. It's also a good idea to know your test results and keep a list of the medicines you take.  How can you care for yourself at home?  If you do not have a safe place to stay, discuss this with your doctor before you leave.  Have a plan for where to go, how to leave your home, and where to stay in case of an emergency. Do not tell your partner about your plan. Contact:  The National Domestic Violence Hotline toll-free at 1-802.231.9603. They can help you find resources in your area.  Your local police department, hospital, or clinic for information about shelters and safe homes near you.  Talk to a trusted friend or neighbor, a counselor, or a kena leader. Do not feel that you have to hide what happened.  Teach your children how to call for help in an emergency.  Be alert to warning signs, such as threats, heavy alcohol use, or drug use. This can help you avoid danger.  If you can, make sure that there are no  "guns or other weapons in your home.  When should you call for help?   Call 911 anytime you think you may need emergency care. For example, call if:    You or someone else has just been abused.     You think you or someone else is in danger of being abused.   Watch closely for changes in your health, and be sure to contact your doctor if you have any problems.  Where can you learn more?  Go to https://www.[employer].net/patiented  Enter G282 in the search box to learn more about \"Intimate Partner Violence: Care Instructions.\"  Current as of: June 24, 2023               Content Version: 14.0    9816-0320 [employer].   Care instructions adapted under license by your healthcare professional. If you have questions about a medical condition or this instruction, always ask your healthcare professional. [employer] disclaims any warranty or liability for your use of this information.      Vaginal Bleeding During Pregnancy: Care Instructions  Overview     It's common to have some vaginal spotting when you are pregnant. In some cases, the bleeding isn't serious. And there aren't any more problems with the pregnancy.  But sometimes bleeding is a sign of a more serious problem. This is more common if the bleeding is heavy or painful. Examples of more serious problems include miscarriage, an ectopic pregnancy, and a problem with the placenta.  You may have to see your doctor again to be sure everything is okay. You may also need more tests to find the cause of the bleeding.  Home treatment may be all you need. But it depends on what is causing the bleeding. Be sure to tell your doctor if you have any new symptoms or if your symptoms get worse.  The doctor has checked you carefully, but problems can develop later. If you notice any problems or new symptoms, get medical treatment right away.  Follow-up care is a key part of your treatment and safety. Be sure to make and go to all appointments, and call " "your doctor if you are having problems. It's also a good idea to know your test results and keep a list of the medicines you take.  How can you care for yourself at home?  If your doctor prescribed medicines, take them exactly as directed. Call your doctor if you think you are having a problem with your medicine.  Do not have vaginal sex until your doctor says it's okay.  Do not put anything in your vagina until your doctor says it's okay.  Ask your doctor about other activities you can or can't do.  Get a lot of rest. Being pregnant can make you tired.  Do not use nonsteroidal anti-inflammatory drugs (NSAIDs), such as ibuprofen (Advil, Motrin), naproxen (Aleve), or aspirin, unless your doctor says it is okay.  When should you call for help?   Call 911 anytime you think you may need emergency care. For example, call if:    You passed out (lost consciousness).     You have severe vaginal bleeding. This means you are soaking through a pad each hour for 2 or more hours.     You have sudden, severe pain in your belly or pelvis.   Call your doctor now or seek immediate medical care if:    You have new or worse vaginal bleeding.     You are dizzy or lightheaded, or you feel like you may faint.     You have pain in your belly, pelvis, or lower back.     You think that you are in labor.     You have a sudden release of fluid from your vagina.     You've been having regular contractions for an hour. This means that you've had at least 8 contractions within 1 hour or at least 4 contractions within 20 minutes, even after you change your position and drink fluids.     You notice that your baby has stopped moving or is moving much less than normal.   Watch closely for changes in your health, and be sure to contact your doctor if you have any problems.  Where can you learn more?  Go to https://www.healthwise.net/patiented  Enter N829 in the search box to learn more about \"Vaginal Bleeding During Pregnancy: Care " "Instructions.\"  Current as of: July 10, 2023               Content Version: 14.0    6171-0709 [employer].   Care instructions adapted under license by your healthcare professional. If you have questions about a medical condition or this instruction, always ask your healthcare professional. [employer] disclaims any warranty or liability for your use of this information.      "

## 2024-07-16 NOTE — Clinical Note
Sending as FYI. Pt has first OB visit with you on 8/5.  Is an IVF pregnancy done in [place]. All current meds were Rx in [place]. Taking Progesterone IM, vaginal suppository and vaginal gel. Also Lovenox.  Lorri DE LA CRUZ RN

## 2024-07-16 NOTE — PROGRESS NOTES
NPN nurse visit done over the phone. Pt will be given NPN folder and book at her upcoming appt.  Discussed optional screening available to assess chromosomal anomalies. Questions answered. Informed pt of the clinic structure (on-call does deliveries for the day, may be male or female doctor). Pt advised to call the clinic if she has any questions or concerns related to her pregnancy. Prenatal labs will be obtained at her upcoming appt. New prenatal visit scheduled on 24 with Dr Lisset House.    8w1d    IVF pregnancy. Had IVF done in [place]. Embryo transfer date 24    Menstrual cycles: regular cycles every 30 days lasting 5-7 days.  Date of positive pregnancy test: 24  Medications stopped upon pos HPT:       Last pap: 4/10/14 NIL - is last pap in system that can be seen. Pt can not recall any other recent pap.        Patient supplied answers from flow sheet for:  Prenatal OB Questionnaire.  Past Medical History  Have you ever recieved care for your mental health? : No  Have you ever been in a major accident or suffered serious trauma?: No  Within the last year, has anyone hit, slapped, kicked or otherwise hurt you?: No  In the last year, has anyone forced you to have sex when you didn't want to?: No    Past Medical History 2   Have you ever received a blood transfusion?: No  Would you accept a blood transfusion if was medically recommended?: Yes  Does anyone in your home smoke?: No   Is your blood type Rh negative?: No  Have you ever ?: (!) Yes  Have you been hospitalized for a nonsurgical reason excluding normal delivery?: No  Have you ever had an abnormal pap smear?: No    Past Medical History (Continued)  Do you have a history of abnormalities of the uterus?: No  Did your mother take MICHAEL or any other hormones when she was pregnant with you?: No  Do you have any other problems we have not asked about which you feel may be important to this pregnancy?: No    Lorri DE LA CRUZ RN

## 2024-07-23 LAB
ABO/RH(D): NORMAL
ANTIBODY SCREEN: NEGATIVE
SPECIMEN EXPIRATION DATE: NORMAL

## 2024-07-24 ENCOUNTER — LAB (OUTPATIENT)
Dept: LAB | Facility: CLINIC | Age: 44
End: 2024-07-24
Payer: COMMERCIAL

## 2024-07-24 ENCOUNTER — ANCILLARY PROCEDURE (OUTPATIENT)
Dept: ULTRASOUND IMAGING | Facility: CLINIC | Age: 44
End: 2024-07-24
Payer: COMMERCIAL

## 2024-07-24 DIAGNOSIS — Z34.80 PRENATAL CARE, SUBSEQUENT PREGNANCY, UNSPECIFIED TRIMESTER: ICD-10-CM

## 2024-07-24 LAB
ERYTHROCYTE [DISTWIDTH] IN BLOOD BY AUTOMATED COUNT: 13.2 % (ref 10–15)
HBV SURFACE AG SERPL QL IA: NONREACTIVE
HCT VFR BLD AUTO: 35.8 % (ref 35–47)
HCV AB SERPL QL IA: NONREACTIVE
HGB BLD-MCNC: 12.1 G/DL (ref 11.7–15.7)
HIV 1+2 AB+HIV1 P24 AG SERPL QL IA: NONREACTIVE
MCH RBC QN AUTO: 30.8 PG (ref 26.5–33)
MCHC RBC AUTO-ENTMCNC: 33.8 G/DL (ref 31.5–36.5)
MCV RBC AUTO: 91 FL (ref 78–100)
PLATELET # BLD AUTO: 324 10E3/UL (ref 150–450)
RBC # BLD AUTO: 3.93 10E6/UL (ref 3.8–5.2)
WBC # BLD AUTO: 6.5 10E3/UL (ref 4–11)

## 2024-07-24 PROCEDURE — 86901 BLOOD TYPING SEROLOGIC RH(D): CPT

## 2024-07-24 PROCEDURE — 76817 TRANSVAGINAL US OBSTETRIC: CPT | Performed by: OBSTETRICS & GYNECOLOGY

## 2024-07-24 PROCEDURE — 87340 HEPATITIS B SURFACE AG IA: CPT

## 2024-07-24 PROCEDURE — 36415 COLL VENOUS BLD VENIPUNCTURE: CPT

## 2024-07-24 PROCEDURE — 86762 RUBELLA ANTIBODY: CPT

## 2024-07-24 PROCEDURE — 86850 RBC ANTIBODY SCREEN: CPT

## 2024-07-24 PROCEDURE — 85027 COMPLETE CBC AUTOMATED: CPT

## 2024-07-24 PROCEDURE — 86900 BLOOD TYPING SEROLOGIC ABO: CPT

## 2024-07-24 PROCEDURE — 76801 OB US < 14 WKS SINGLE FETUS: CPT | Performed by: OBSTETRICS & GYNECOLOGY

## 2024-07-24 PROCEDURE — 87389 HIV-1 AG W/HIV-1&-2 AB AG IA: CPT

## 2024-07-24 PROCEDURE — 86803 HEPATITIS C AB TEST: CPT

## 2024-07-24 PROCEDURE — 86780 TREPONEMA PALLIDUM: CPT

## 2024-07-25 LAB
RUBV IGG SERPL QL IA: 2.42 INDEX
RUBV IGG SERPL QL IA: POSITIVE
T PALLIDUM AB SER QL: NONREACTIVE

## 2024-08-05 ENCOUNTER — TRANSCRIBE ORDERS (OUTPATIENT)
Dept: MATERNAL FETAL MEDICINE | Facility: CLINIC | Age: 44
End: 2024-08-05

## 2024-08-05 ENCOUNTER — PRENATAL OFFICE VISIT (OUTPATIENT)
Dept: OBGYN | Facility: CLINIC | Age: 44
End: 2024-08-05
Payer: COMMERCIAL

## 2024-08-05 VITALS — SYSTOLIC BLOOD PRESSURE: 118 MMHG | WEIGHT: 260.3 LBS | DIASTOLIC BLOOD PRESSURE: 70 MMHG | BODY MASS INDEX: 36.3 KG/M2

## 2024-08-05 DIAGNOSIS — O09.523 MULTIGRAVIDA OF ADVANCED MATERNAL AGE IN THIRD TRIMESTER: Primary | ICD-10-CM

## 2024-08-05 DIAGNOSIS — Z11.3 SCREEN FOR STD (SEXUALLY TRANSMITTED DISEASE): ICD-10-CM

## 2024-08-05 DIAGNOSIS — O26.90 PREGNANCY RELATED CONDITION, ANTEPARTUM: Primary | ICD-10-CM

## 2024-08-05 DIAGNOSIS — O09.529 HIGH-RISK PREGNANCY, ELDERLY MULTIGRAVIDA, UNSPECIFIED TRIMESTER: ICD-10-CM

## 2024-08-05 DIAGNOSIS — O09.819 PREGNANCY RESULTING FROM IN VITRO FERTILIZATION, ANTEPARTUM: ICD-10-CM

## 2024-08-05 PROCEDURE — 87491 CHLMYD TRACH DNA AMP PROBE: CPT | Performed by: OBSTETRICS & GYNECOLOGY

## 2024-08-05 PROCEDURE — 99213 OFFICE O/P EST LOW 20 MIN: CPT | Performed by: OBSTETRICS & GYNECOLOGY

## 2024-08-05 PROCEDURE — 87591 N.GONORRHOEAE DNA AMP PROB: CPT | Performed by: OBSTETRICS & GYNECOLOGY

## 2024-08-05 NOTE — NURSING NOTE
"Chief Complaint   Patient presents with    Prenatal Care     First ob visit, 11 weeks 0 days, patient did IVF in [place]. No c/o VB or cramping. Declined genetic testing. Declined pap smear.       Initial /70   Wt 118.1 kg (260 lb 4.8 oz)   LMP 05/15/2024 (Approximate)   BMI 36.30 kg/m   Estimated body mass index is 36.3 kg/m  as calculated from the following:    Height as of 24: 1.803 m (5' 11\").    Weight as of this encounter: 118.1 kg (260 lb 4.8 oz).  BP completed using cuff size: large    Questioned patient about current smoking habits.  Pt. has never smoked.          The following HM Due: pap smear    Sonja Morris CMA               "

## 2024-08-05 NOTE — PROGRESS NOTES
Eber is a 44 year old  at 11w0d here for new ob visit.      IVF pregnancy, IVF was done in [place].  This is her second IVF pregnancy, first two pregnancies were spontaneous.  FOB Marvin Jones, same FOB  Needs rx refill for vaginal progesterone  Is also on lovenox 40 daily from her IVF clinic, was told to stop after 14 wks  Due for pap, will do later  H/o GDMA1 in third pregnancy  Declines NIPT      OB History    Para Term  AB Living   4 3 3 0 0 3   SAB IAB Ectopic Multiple Live Births   0 0 0 0 3      # Outcome Date GA Lbr Zack/2nd Weight Sex Type Anes PTL Lv   4 Current            3 Term 22 39w1d 03:46 / 00:19 3.82 kg (8 lb 6.8 oz) F Vag-Spont EPI N ANNETTA      Name: CHRISTIANFEMALE-EBER      Apgar1: 8  Apgar5: 9   2 Term 05 40w0d  3.317 kg (7 lb 5 oz) F    ANNETTA   1 Term 03 41w0d 24:00 4.082 kg (9 lb) M    ANNETTA      Birth Comments: no comp       ROS: Ten point review of systems was reviewed and negative except the above.    Past Medical History:   Diagnosis Date    Asthma 2022    with illness    Female infertility     No pertinent past medical history 2022    Vaginal delivery 2022    Varicella     White classification A1 gestational diabetes mellitus (GDM), diet controlled 2022     Past Surgical History:   Procedure Laterality Date    CHOLECYSTECTOMY, LAPOROSCOPIC  3/09    [facility] GENITAL SURG PROC, FEMALE UNLISTED      female circumcision     Patient Active Problem List    Diagnosis Date Noted    Vaginal delivery 2022     Priority: Medium    Gestational diabetes mellitus, delivered, current hospitalization 2022     Priority: Medium    Asthma 2022     Priority: Medium    Vitamin D deficiency disease 2013     Priority: Medium    CARDIOVASCULAR SCREENING; LDL GOAL LESS THAN 160 10/31/2010     Priority: Medium    Obesity during pregnancy, delivered 2010     Priority: Medium        Allergies   Allergen Reactions    No Known Drug  "Allergy      Current Outpatient Medications   Medication Sig Dispense Refill    albuterol (PROAIR HFA/PROVENTIL HFA/VENTOLIN HFA) 108 (90 Base) MCG/ACT inhaler Inhale 2 puffs into the lungs every 6 hours as needed for shortness of breath or wheezing 18 g 0    enoxaparin ANTICOAGULANT (LOVENOX) 40 MG/0.4ML syringe Inject 40 mg subcutaneously daily      progesterone (CRINONE) 8 % VA GEL Place 1 applicator vaginally every morning      progesterone 200 MG VA SUPP Place 400 mg vaginally 2 times daily      PROGESTERONE IM Inject 500 mg into the muscle See Admin Instructions Takes every 5 days       No current facility-administered medications for this visit.       Physical Exam:   /70   Wt 118.1 kg (260 lb 4.8 oz)   LMP 05/15/2024 (Approximate)   BMI 36.30 kg/m      Gen:  no acute distress, comfortable, smiling  HENT: No scleral injection or icterus  CV: Regular rate and rhythm, no m/g/r  Resp: Normal work of breathing, no cough  GI: Abdomen soft, non-tender. No masses, organomegaly  Skin: No suspicious lesions or rashes  Psychiatric: mentation appears normal and affect bright    Doptones 150s    FH cwd    No results found for: \"ABO\", \"RH\", \"MCLD3194\"    A/P 44 year old  at 11w0d here for NOB visit.  - Discussed physician coverage, tertiary support, diet, exercise, weight gain, schedule of visits, routine and indicated ultrasounds, and childbirth education.  Discussed MFM coverage and reviewed options for  testing in the first trimester.  Recommended PNV.  NOB labs and US reviewed.       Concerns:  - B+/RI.  CHRISTIAN Wong.  Declines NIPT.   - IVF pregnancy, IVF was done in [place].  Needs rx refill for vaginal progesterone, she was told to continue until 14 wks. Is also on lovenox 40 daily from her IVF clinic, was told to stop after 14 wks  -Due for pap, will do later  - H/o GDMA1 in third pregnancy declines first trimester screen, will do early GCT at 24 wks  - Pt recommended for 81 mg baby ASA for " pre-eclampsia prevention based of risk factors.    RTC 4 weeks    Constanza House MD, MPH  [facility]

## 2024-08-06 ENCOUNTER — TELEPHONE (OUTPATIENT)
Dept: OBGYN | Facility: CLINIC | Age: 44
End: 2024-08-06
Payer: COMMERCIAL

## 2024-08-06 DIAGNOSIS — O09.819 PREGNANCY RESULTING FROM IN VITRO FERTILIZATION, ANTEPARTUM: Primary | ICD-10-CM

## 2024-08-06 LAB
C TRACH DNA SPEC QL NAA+PROBE: NEGATIVE
N GONORRHOEA DNA SPEC QL NAA+PROBE: NEGATIVE

## 2024-08-06 NOTE — TELEPHONE ENCOUNTER
[facility] pharmacy notified us that pts insurance does not cover Endometrin vag supp. This was rx'd on 8/5/24.      Juliette OLIVA RN  [facility]

## 2024-08-09 RX ORDER — PROGESTERONE 100 MG/1
200 CAPSULE ORAL 2 TIMES DAILY
Qty: 60 CAPSULE | Refills: 1 | Status: SHIPPED | OUTPATIENT
Start: 2024-08-09

## 2024-08-09 NOTE — TELEPHONE ENCOUNTER
We had put this one in because of it being the one she was on from her IVF MDs.  I tried the one I usually use.

## 2024-08-12 NOTE — TELEPHONE ENCOUNTER
LM to see if pt picked this up. Will ask if the endometrin was covered before or if it matters to her to take the Dr House sent.    Brittanie GRENE RN BSN  [facility]

## 2024-08-13 NOTE — TELEPHONE ENCOUNTER
Spoke with the pt.     She got the correct medication from the pharmacy.    Juliette OLIVA RN  [facility]

## 2024-08-27 ENCOUNTER — PRENATAL OFFICE VISIT (OUTPATIENT)
Dept: OBGYN | Facility: CLINIC | Age: 44
End: 2024-08-27
Payer: COMMERCIAL

## 2024-08-27 VITALS
HEIGHT: 71 IN | WEIGHT: 265 LBS | BODY MASS INDEX: 37.1 KG/M2 | SYSTOLIC BLOOD PRESSURE: 114 MMHG | DIASTOLIC BLOOD PRESSURE: 78 MMHG

## 2024-08-27 DIAGNOSIS — Z34.80 SUPERVISION OF OTHER NORMAL PREGNANCY, ANTEPARTUM: Primary | ICD-10-CM

## 2024-08-27 DIAGNOSIS — O09.819 PREGNANCY RESULTING FROM IN VITRO FERTILIZATION, ANTEPARTUM: ICD-10-CM

## 2024-08-27 PROCEDURE — 99207 PR PRENATAL VISIT: CPT | Performed by: OBSTETRICS & GYNECOLOGY

## 2024-08-27 NOTE — NURSING NOTE
"Chief Complaint   Patient presents with    Prenatal Care     14 1/7 weeks       Initial /78 (BP Location: Right arm, Patient Position: Chair, Cuff Size: Adult Large)   Ht 1.803 m (5' 11\")   Wt 120.2 kg (265 lb)   LMP 05/15/2024 (Approximate)   Breastfeeding No   BMI 36.96 kg/m   Estimated body mass index is 36.96 kg/m  as calculated from the following:    Height as of this encounter: 1.803 m (5' 11\").    Weight as of this encounter: 120.2 kg (265 lb).  BP completed using cuff size: large    Questioned patient about current smoking habits.  Pt. has never smoked.          The following HM Due: NONE  Rupali Armstrong CMA           "

## 2024-09-17 ENCOUNTER — PRE VISIT (OUTPATIENT)
Dept: MATERNAL FETAL MEDICINE | Facility: CLINIC | Age: 44
End: 2024-09-17
Payer: COMMERCIAL

## 2024-09-25 ENCOUNTER — HOSPITAL ENCOUNTER (OUTPATIENT)
Dept: ULTRASOUND IMAGING | Facility: CLINIC | Age: 44
Discharge: HOME | End: 2024-09-25
Attending: OBSTETRICS & GYNECOLOGY
Payer: COMMERCIAL

## 2024-09-25 ENCOUNTER — OFFICE VISIT (OUTPATIENT)
Dept: MATERNAL FETAL MEDICINE | Facility: CLINIC | Age: 44
End: 2024-09-25
Attending: OBSTETRICS & GYNECOLOGY
Payer: COMMERCIAL

## 2024-09-25 DIAGNOSIS — O09.812 PREGNANCY RESULTING FROM IN VITRO FERTILIZATION IN SECOND TRIMESTER: ICD-10-CM

## 2024-09-25 DIAGNOSIS — O26.90 PREGNANCY RELATED CONDITION, ANTEPARTUM: ICD-10-CM

## 2024-09-25 DIAGNOSIS — Z36.2 ENCOUNTER FOR FOLLOW-UP ULTRASOUND OF FETAL ANATOMY: ICD-10-CM

## 2024-09-25 DIAGNOSIS — O09.522 MULTIGRAVIDA OF ADVANCED MATERNAL AGE IN SECOND TRIMESTER: Primary | ICD-10-CM

## 2024-09-25 PROCEDURE — 96040 HC GENETIC COUNSELING, EACH 30 MINUTES: CPT

## 2024-09-25 PROCEDURE — 76811 OB US DETAILED SNGL FETUS: CPT | Mod: 26 | Performed by: OBSTETRICS & GYNECOLOGY

## 2024-09-25 PROCEDURE — 76811 OB US DETAILED SNGL FETUS: CPT

## 2024-09-25 PROCEDURE — 99213 OFFICE O/P EST LOW 20 MIN: CPT | Mod: 25 | Performed by: OBSTETRICS & GYNECOLOGY

## 2024-09-25 NOTE — NURSING NOTE
Patient does not yet feel fetal movement, denies pain, denies contractions, leaking of fluid, or bleeding.   SBAR given to ADAMARIS PEARL, see their note in Epic.

## 2024-09-25 NOTE — PROGRESS NOTES
[facility]  Genetic Counseling Consult    Patient:  Manuel Phillip  Preferred Name: Manuel YOB: 1980   Date of Service:  24   MRN: [id number]    Manuel was seen at the [facility] for genetic consultation. The indication for genetic counseling is advanced maternal age. The patient was unaccompanied to this visit.     The session was conducted in English.      IMPRESSION/ PLAN   1. Manuel has not had genetic screening in this pregnancy and declines options discussed today.    2. During today's [facility] visit, Manuel had a genetic counseling session only. Screening and diagnostic testing was discussed and declined.     3. Since the patient chose aneuploidy screening via NIPT, quad screen is NOT recommended in the second trimester. If the patient desires screening for open neural tube defects, maternal serum AFP only is recommended, ideally between 16-18 weeks gestation.    4. Manuel had a level II comprehensive anatomy ultrasound today. Please see the ultrasound report for further details.    5. Further recommendation include a follow-up ultrasound with [facility]. The upcoming ultrasound has been scheduled for 10/25/2024.    PREGNANCY HISTORY   /Parity:       Manuel's pregnancy history is significant for:   : Term preganncy,  delivery with her now healthy 20 year old son  : Term pregnancy,  delivery with her now healthy 19 year old daughter  : Term pregnancy, Vag-Spont. delivery with her now 2 year old daughter    CURRENT PREGNANCY   Current Age: 44 year old   Age at Delivery: 45 year old  KENZIE: 2025, by Embryo Transfer                                   Gestational Age: 18w2d  This pregnancy is a single gestation.   This pregnancy was conceived with in vitro fertilization (IVF). The following was discussed:   Embryo transfer date: 2024  Number of transferred embryos: 3, however only one embryo  "resulted in a successful pregnancy  Use of egg or sperm donor: No  Age of egg retrieval: 44  Frozen transfer, day unknown  Preimplantation genetic testing (PGT) was not performed on the embryos   PGT-A is a screening test. Therefore, a normal PGT-A result significantly reduces but does not eliminate the possibility of aneuploidy. Invasive testing (such as amniocentesis) is recommended if the patient desires definitive information regarding aneuploidy in pregnancy. We discussed the limitation of NIPT following PGT-A and that pursuing multiple screening tests may result in conflicting information in which case the option of invasive testing would be reviewed again.  Fetal echocardiogram will be recommended and scheduled after the 18-20 week fetal anatomy ultrasound  The average pregnancy has a 0.5-1.0% chance of a congenital heart defect. However, studies suggest an increased chance for a heart defect for IVF pregnancies, with estimates ranging from 1-3.3%. Fetal echocardiograms are typically performed at 20 to 24 weeks gestation.    MEDICAL HISTORY   Manuel s reported medical history is not expected to impact pregnancy management or risks to fetal development.       FAMILY HISTORY   A three-generation pedigree was obtained today and is scanned under the \"Media\" tab in Epic. The family history was reported by Manuel.    The following significant findings were reported today:   Manuel's partner, Marvin, is currently 46 years old and healthy. Manuel reports that she and her  may be \"distantly related, like 3rd or 4th degree cousins.\" Manuel was not quite able to describe within the family how she and her partner were distantly related, however, we do know that 3rd cousins are a 7th degree of relation, while 4th cousins are a 9th degree of relation. This means that if Manuel and Marvin are 3rd or 4th cousins, we would estimate they could share between ~0.2-0.8% of their DNA. While this is a very small amount of " shared DNA, it may still be higher than that of the general population. Carrier screening was discussed in depth and declined today, however, Manuel is aware the options remain available to her and her partner.     Otherwise, the reported family history is unremarkable for multiple miscarriages, stillbirths, birth defects, intellectual disabilities, and known genetic conditions.       RISK ASSESSMENT FOR INHERITED CONDITIONS AND CARRIER SCREENING OPTIONS   Expanded carrier screening is available to screen for autosomal recessive conditions and X-linked conditions in a large list of genes. Carrier screening does not test the pregnancy but gives a risk assessment for the pregnancy and future pregnancies to have the condition. Expanded carrier screening is designed to identify carrier status for conditions that are primarily childhood or adolescent onset. Expanded carrier screening does not evaluate for adult-onset conditions such as hereditary cancer syndromes, dementia/ Alzheimer's disease, or cardiovascular disease risk factors. Additionally, expanded carrier screening is not comprehensive for all known genetic diseases or inherited conditions. Carrier screening does not test for all genetic and health conditions or risk factors.     Autosomal recessive conditions happen when a mutation has been inherited from the egg and sperm and include conditions like cystic fibrosis, thalassemia, hearing loss, spinal muscular atrophy, and more. We reviewed that when both biological parents carry a harmful genetic change in a gene associated with autosomal recessive inheritance, each of their pregnancies has a 1 in 4 (25%) chance to be affected by that condition. X-linked conditions happen when a mutation has been inherited from the egg and include conditions like fragile X syndrome.With x-linked conditions, the specific risk generally depends on the chromosomal sex of the fetus, with XY individuals (generally male) being most  severely affected.      screening was reviewed. About [place]  Screening    The patient does NOT have a family history of known inherited conditions. This does NOT mean the patient and/or their partner is not a carrier of a condition. Approximately 90% of couples at an increased reproductive risk for an inherited condition have no family history of that condition.     The patient has not had carrier screening previously.     The patient declined the carrier screening options. They are aware the option will remain, and they can contact us if they would like to pursue screening. See below for the more detailed information we dicussed.  Carrier screening does not test the pregnancy but gives a risk assessment for the pregnancy and future pregnancies to have the condition. The only way to determine with absolutely certainty whether if a pregnancy is affected with a  genetic condition is through diagnostic testing such as a chorionic villus sampling or amniocentesis. Other options would include testing baby after delivery.   There are different size panels or list of conditions for carrier screening.  Carrier screening does not test for all genetic and health conditions or risk factors    RISK ASSESSMENT FOR CHROMOSOME CONDITIONS   We explained that the risk for fetal chromosome abnormalities increases with maternal age. We discussed specific features of common chromosome abnormalities, including trisomy 21 (Down syndrome), trisomy 13, trisomy 18, and sex chromosome trisomies.    At age 45 at midtrimester, the risk to have a baby with Down syndrome is 1 in 23.   At age 45 at midtrimester, the risk to have a baby with any chromosome abnormality is 1 in 15.   At age 45 at delivery, the risk to have a baby with Down syndrome is 1 in 38.   At age 45 at delivery, the risk to have a baby with any chromosome abnormality is 1 in 25.     Manuel has not had genetic screening in this pregnancy and declines options discussed  today.     GENETIC TESTING OPTIONS   Genetic testing during a pregnancy includes screening and diagnostic procedures.      Screening tests are non-invasive which means no risk to the pregnancy and includes ultrasounds and blood work. The benefits and limitations of screening were reviewed. Screening tests provide a risk assessment (chance) specific to the pregnancy for certain fetal chromosome abnormalities but cannot definitively diagnose or exclude a fetal chromosome abnormality. Follow-up genetic counseling and consideration of diagnostic testing is recommended with any abnormal screening result. Diagnostic testing during a pregnancy is more certain and can test for more conditions. However, the tests do have a risk of miscarriage that requires careful consideration. These tests can detect fetal chromosome abnormalities with greater than 99% certainty. Results can be compromised by maternal cell contamination or mosaicism and are limited by the resolution of current genetic testing technology.     There is no screening or diagnostic test that detects all forms of birth defects or intellectual disability.     We discussed the following screening options:     Non-invasive prenatal testing (NIPT)  Also called cell-free DNA screening because it detects chromosomes from the placenta in the pregnant person's blood  Can be done any time after 10 weeks gestation  Standard recommendation for NIPT screens for trisomy 21, trisomy 18, trisomy 13, with the option of adding sex chromosome aneuploidies, without or without predicted sex  Cannot screen for open neural tube defects, maternal serum AFP after 15 weeks is recommended  New NIPT options include screening for other trisomies, microdeletion syndromes, and in some cases fetal blood antigens. Guidelines do not recommend these conditions are included in standard screening. These options have limitations and should be discussed with a genetic counselor.   However, current  (2023) ACMG guidelines do recommend that screening for one microdeletion syndrome, called 22q11.2 deletion syndrome be offered to all pregnant patients. 22q11.2 deletion syndrome has an estimated prevalence of 1 in 990 to 1 in 2148 (0.05-0.1%). Risk is not thought to increase with maternal age. Clinical features are variable but include congenital heart defects, cleft palate, developmental delays, immune system deficiencies, and hearing loss. Approximately 90% of cases are de marylin (a sporadic new change in a pregnancy). Cell-free DNA screening for 22q11.2 deletion syndrome is available with the inclusion of other microdeletion syndromes. There is less data about the performance of cell-free DNA screening for more rare microdeletions and the chance for false positives or negative may be increased.  We discussed the limitations of cell-free DNA screening in detecting microdeletions and the possibility of false positives and false negatives. Microdeletion screening was not discussed.    We discussed the following ultrasound options:    Comprehensive level II ultrasound (Fetal Anatomy Ultrasound)  Ultrasound done between 18-20 weeks gestation  Screens for major birth defects and markers for aneuploidy (like trisomy 21 and trisomy 18)  Includes looking at the fetus/baby's growth, heart, organs (stomach, kidneys), placenta, and amniotic fluid    Fetal Echocardiogram  Ultrasound done between 22-24 weeks gestation  Screen for heart defects  Recommended if there are concerns about the heart or other indications like IVF pregnancy, maternal diabetes, or other ultrasound findings for concerns of a fetal heart defect, such as a nuchal translucency greater than the 99th percentile in the first trimester      We discussed the following diagnostic options:     Amniocentesis  Invasive diagnostic procedure done after 15 weeks gestation  The procedure collects a small sample of amniotic fluid for the purpose of chromosomal testing  and/or other genetic testing  Diagnostic result; more than 99% sensitivity for fetal chromosome abnormalities  Testing for AFP in the amniotic fluid can test for open neural tube defects    It was a pleasure to be involved with Amaal s care. Face-to-face time of the meeting was 25 minutes.    Brissa Kilgore GC, MS, [facility]  Board Certified and [place] Licensed Genetic Counselor  [facility]  Maternal Fetal Medicine  Office: [phone]  [facility]: [phone]   Fax: [phone]  [facility]

## 2024-09-25 NOTE — PROGRESS NOTES
The patient was seen for an ultrasound in the Maternal-Fetal Medicine Center at the [facility] today.  For a detailed report of the ultrasound examination, please see the ultrasound report which can be found under the imaging tab.    If you have questions regarding today's evaluation or if we can be of further service, please contact the Maternal-Fetal Medicine Center.    Yulia Adams MD  , OB/GYN  Maternal-Fetal Medicine  [phone] (Pager)

## 2024-10-25 ENCOUNTER — OFFICE VISIT (OUTPATIENT)
Dept: MATERNAL FETAL MEDICINE | Facility: CLINIC | Age: 44
End: 2024-10-25
Attending: OBSTETRICS & GYNECOLOGY
Payer: COMMERCIAL

## 2024-10-25 ENCOUNTER — HOSPITAL ENCOUNTER (OUTPATIENT)
Dept: ULTRASOUND IMAGING | Facility: CLINIC | Age: 44
Discharge: HOME | End: 2024-10-25
Attending: OBSTETRICS & GYNECOLOGY
Payer: COMMERCIAL

## 2024-10-25 ENCOUNTER — OFFICE VISIT (OUTPATIENT)
Dept: CARDIOLOGY | Facility: CLINIC | Age: 44
End: 2024-10-25
Payer: COMMERCIAL

## 2024-10-25 ENCOUNTER — HOSPITAL ENCOUNTER (OUTPATIENT)
Dept: CARDIOLOGY | Facility: CLINIC | Age: 44
Discharge: HOME | End: 2024-10-25
Attending: OBSTETRICS & GYNECOLOGY
Payer: COMMERCIAL

## 2024-10-25 DIAGNOSIS — O09.522 MULTIGRAVIDA OF ADVANCED MATERNAL AGE IN SECOND TRIMESTER: Primary | ICD-10-CM

## 2024-10-25 DIAGNOSIS — O09.812 PREGNANCY RESULTING FROM IN VITRO FERTILIZATION IN SECOND TRIMESTER: ICD-10-CM

## 2024-10-25 DIAGNOSIS — Z36.2 ENCOUNTER FOR FOLLOW-UP ULTRASOUND OF FETAL ANATOMY: ICD-10-CM

## 2024-10-25 DIAGNOSIS — Z36.3 ENCOUNTER FOR ROUTINE SCREENING FOR FETAL MALFORMATION USING ULTRASOUND: ICD-10-CM

## 2024-10-25 DIAGNOSIS — O35.BXX0 FETAL CARDIAC DISEASE AFFECTING PREGNANCY, SINGLE OR UNSPECIFIED FETUS: Primary | ICD-10-CM

## 2024-10-25 PROCEDURE — 76825 ECHO EXAM OF FETAL HEART: CPT | Mod: 26 | Performed by: PEDIATRICS

## 2024-10-25 PROCEDURE — 76816 OB US FOLLOW-UP PER FETUS: CPT | Mod: 26 | Performed by: OBSTETRICS & GYNECOLOGY

## 2024-10-25 PROCEDURE — 76827 ECHO EXAM OF FETAL HEART: CPT | Mod: 26 | Performed by: PEDIATRICS

## 2024-10-25 PROCEDURE — 76816 OB US FOLLOW-UP PER FETUS: CPT

## 2024-10-25 PROCEDURE — 93325 DOPPLER ECHO COLOR FLOW MAPG: CPT

## 2024-10-25 PROCEDURE — 93325 DOPPLER ECHO COLOR FLOW MAPG: CPT | Mod: 26 | Performed by: PEDIATRICS

## 2024-10-25 PROCEDURE — 99202 OFFICE O/P NEW SF 15 MIN: CPT | Mod: 25 | Performed by: PEDIATRICS

## 2024-10-25 NOTE — NURSING NOTE
Patient reports + fetal movement, no pain, no contractions, leaking of fluid, or bleeding.    Education provided to patient on today's visit.  Gave patient information on her OBV scheduled 11/4 with .  SBAR given to ADAMARIS PEARL, see their note in Epic.

## 2024-10-25 NOTE — PROGRESS NOTES
Please refer to ultrasound report under 'Imaging' Studies of 'Chart Review' tabs.    Zacarias Leigh M.D.

## 2024-10-25 NOTE — PROGRESS NOTES
Fetal Cardiology Consultation    Patient:  Manuel Phillip MRN:  [id number]   YOB: 1980 Age:  44 year old   Date of Visit:  10/25/2024 PCP:  Nya Ref-Primary, Physician   KENZIE: 2/24/2025, by Embryo Transfer EGA: 22w4d weeks     Dear Doctor,     I had the pleasure of seeing Manuel Phillip at the [facility] in [place] on 10/25/2024 in consultation for fetal echocardiography results. She presented today by herself. As you know, she is a 44 year old female with current pregnancy achieved through IVF.    The fetal echocardiogram was normal. Normal fetal cardiac anatomy. Normal right and left ventricular size and function without hypertrophy. No evidence of diastolic dysfunction. No pericardial effusion. No arrhythmia.     I reviewed and interpreted the fetal echocardiogram today. I discussed the normal results with Ms. Phillip. While these results are normal, it is important to note that fetal echocardiography cannot exclude small atrial or ventricular septal defects, persistent ductus arteriosus, mild coarctation of the aorta, partial anomalous pulmonary venous return, minor anatomic valve anomalies, or coronary artery anomalies.     -- No additional fetal echocardiograms are recommended for this pregnancy.    Thank you for allowing me to participate in Ms. Phillip's care. Please don't hesitate to contact me or the Fetal Cardiology team at [facility] with any questions or concerns.     I spent a total of 20 minutes on the date of the encounter doing chart review, patient history, documentation, counseling, and coordinating care.    Carolyn Cortes MD  Pediatric Cardiology  [facility]  Phone [phone]

## 2024-11-04 ENCOUNTER — PRENATAL OFFICE VISIT (OUTPATIENT)
Dept: OBGYN | Facility: CLINIC | Age: 44
End: 2024-11-04
Payer: COMMERCIAL

## 2024-11-04 VITALS
WEIGHT: 269 LBS | HEIGHT: 71 IN | BODY MASS INDEX: 37.66 KG/M2 | SYSTOLIC BLOOD PRESSURE: 110 MMHG | DIASTOLIC BLOOD PRESSURE: 70 MMHG

## 2024-11-04 DIAGNOSIS — Z34.80 SUPERVISION OF OTHER NORMAL PREGNANCY, ANTEPARTUM: Primary | ICD-10-CM

## 2024-11-04 PROCEDURE — 99207 PR PRENATAL VISIT: CPT | Performed by: OBSTETRICS & GYNECOLOGY

## 2024-11-04 NOTE — NURSING NOTE
"Chief Complaint   Patient presents with    Prenatal Care     24 weeks       Initial /70 (BP Location: Right arm, Patient Position: Chair, Cuff Size: Adult Large)   Ht 1.803 m (5' 11\")   Wt 122 kg (269 lb)   LMP 05/15/2024 (Approximate)   Breastfeeding No   BMI 37.52 kg/m   Estimated body mass index is 37.52 kg/m  as calculated from the following:    Height as of this encounter: 1.803 m (5' 11\").    Weight as of this encounter: 122 kg (269 lb).  BP completed using cuff size: large    Questioned patient about current smoking habits.  Pt. has never smoked.          The following HM Due: NONE  +fetal movement  Rupali Armstrong, CMA    "

## 2024-11-05 NOTE — PROGRESS NOTES
44 year old  at 24w0d     - B+/RI.  FOB Marvin.  Declines NIPT.   - IVF pregnancy, IVF was done in [place].  Normal fetal echo.  Planning serial growth and delivery 39+0 - 39+6.  -Due for pap, will do at PP visit  - H/o GDMA1 in third pregnancy, planning GCT with RN only visit in coming weeks  - AMA > age 40: s/p GC and MFM   - BMI 37    RTC 4wk     Constanza House MD, MPH  [facility] OB/Gyn

## 2024-11-09 ENCOUNTER — HEALTH MAINTENANCE LETTER (OUTPATIENT)
Age: 44
End: 2024-11-09

## 2024-12-17 ENCOUNTER — PRENATAL OFFICE VISIT (OUTPATIENT)
Dept: OBGYN | Facility: CLINIC | Age: 44
End: 2024-12-17
Payer: COMMERCIAL

## 2024-12-17 VITALS
SYSTOLIC BLOOD PRESSURE: 110 MMHG | WEIGHT: 270 LBS | BODY MASS INDEX: 37.8 KG/M2 | HEIGHT: 71 IN | DIASTOLIC BLOOD PRESSURE: 70 MMHG

## 2024-12-17 DIAGNOSIS — O99.810 ABNORMAL MATERNAL GLUCOSE TOLERANCE, ANTEPARTUM: Primary | ICD-10-CM

## 2024-12-17 DIAGNOSIS — Z34.80 SUPERVISION OF OTHER NORMAL PREGNANCY, ANTEPARTUM: Primary | ICD-10-CM

## 2024-12-17 LAB
ERYTHROCYTE [DISTWIDTH] IN BLOOD BY AUTOMATED COUNT: 12.8 % (ref 10–15)
GLUCOSE 1H P 50 G GLC PO SERPL-MCNC: 137 MG/DL (ref 70–129)
HCT VFR BLD AUTO: 31.3 % (ref 35–47)
HGB BLD-MCNC: 11 G/DL (ref 11.7–15.7)
MCH RBC QN AUTO: 31.3 PG (ref 26.5–33)
MCHC RBC AUTO-ENTMCNC: 35.1 G/DL (ref 31.5–36.5)
MCV RBC AUTO: 89 FL (ref 78–100)
PLATELET # BLD AUTO: 277 10E3/UL (ref 150–450)
RBC # BLD AUTO: 3.52 10E6/UL (ref 3.8–5.2)
WBC # BLD AUTO: 7.9 10E3/UL (ref 4–11)

## 2024-12-17 PROCEDURE — 36415 COLL VENOUS BLD VENIPUNCTURE: CPT | Performed by: OBSTETRICS & GYNECOLOGY

## 2024-12-17 PROCEDURE — 82950 GLUCOSE TEST: CPT | Performed by: OBSTETRICS & GYNECOLOGY

## 2024-12-17 PROCEDURE — 85027 COMPLETE CBC AUTOMATED: CPT | Performed by: OBSTETRICS & GYNECOLOGY

## 2024-12-17 RX ORDER — VITAMIN A, VITAMIN C, VITAMIN D-3, VITAMIN E, VITAMIN B-1, VITAMIN B-2, NIACIN, VITAMIN B-6, CALCIUM, IRON, ZINC, COPPER 4000; 120; 400; 22; 1.84; 3; 20; 10; 1; 12; 200; 27; 25; 2 [IU]/1; MG/1; [IU]/1; MG/1; MG/1; MG/1; MG/1; MG/1; MG/1; UG/1; MG/1; MG/1; MG/1; MG/1
TABLET ORAL DAILY
COMMUNITY

## 2024-12-17 NOTE — PROGRESS NOTES
44 year old  at 30w1d     - B+/RI.  FOB Marvin.  Declines NIPT.   - IVF pregnancy, IVF was done in [place].  Normal fetal echo.  Planning serial growth and delivery 39+0 - 39+6.  -Due for pap, will do at PP visit  - H/o GDMA1 in third pregnancy, checked her BG on her 's glucometer and got a 200 last week - doing GCT today  - AMA > age 40: s/p GC and MFM   - BMI 37, planning once weekly BPP after 36 wks  - fatigue, planning vit D, CBC, and ferritin labs with GCT today    1. Supervision of other normal pregnancy, antepartum (Primary)    2. Fatigue    3. History of gestational diabetes  - CBC with platelets  - Treponema Abs w Reflex to RPR and Titer  - Glucose tolerance gest screen 1 hour  - Vitamin D Deficiency; Future  - Ferritin; Future  - US Fetal Biophys Prof w/o Non Stress Test; Future  - US Fetal Biophys Prof w/o Non Stress Test; Future  - US Fetal Biophys Prof w/o Non Stress Test; Future  - US Fetal Biophys Prof w/o Non Stress Test; Future     RTC 2 wks    Constanza House MD, MPH  [facility] OB/Gyn

## 2024-12-18 LAB
FERRITIN SERPL-MCNC: 11 NG/ML (ref 6–175)
T PALLIDUM AB SER QL: NONREACTIVE
VIT D+METAB SERPL-MCNC: 44 NG/ML (ref 20–50)

## 2025-01-08 ENCOUNTER — PRENATAL OFFICE VISIT (OUTPATIENT)
Dept: OBGYN | Facility: CLINIC | Age: 45
End: 2025-01-08
Payer: COMMERCIAL

## 2025-01-08 VITALS — DIASTOLIC BLOOD PRESSURE: 70 MMHG | SYSTOLIC BLOOD PRESSURE: 118 MMHG | BODY MASS INDEX: 37.49 KG/M2 | WEIGHT: 268.8 LBS

## 2025-01-08 DIAGNOSIS — O09.529 HIGH-RISK PREGNANCY, ELDERLY MULTIGRAVIDA, UNSPECIFIED TRIMESTER: Primary | ICD-10-CM

## 2025-01-08 DIAGNOSIS — Z34.80 SUPERVISION OF OTHER NORMAL PREGNANCY, ANTEPARTUM: ICD-10-CM

## 2025-01-08 NOTE — PROGRESS NOTES
44 year old  at 33w2d   Feeling well. Good FM    - B+/RI.  FOB Marvin.  Declines NIPT.   - IVF pregnancy, IVF was done in [place].  Normal fetal echo.  Planning serial growth and delivery 39+0 - 39+6.  -Due for pap, will do at PP visit  - H/o GDMA1 in third pregnancy, checked her BG on her 's glucometer and got a 200 last week - doing GCT today  - AMA > age 40: s/p GC and MFM   - BMI 37, planning once weekly BPP after 36 wks  - fatigue, planning vit D, CBC, and ferritin labs with GCT today    History of gestational diabetes  - failed 1 hour GCT, agrees to schedule 3 hour test this week.  - US Fetal Biophys Prof w/o Non Stress Test; Future  - US Fetal Biophys Prof w/o Non Stress Test; Future  - US Fetal Biophys Prof w/o Non Stress Test; Future  - US Fetal Biophys Prof w/o Non Stress Test; Future     RTC 2 wks    Meera Chan MD   [facility] OB/Gyn

## 2025-01-08 NOTE — NURSING NOTE
"Chief Complaint   Patient presents with    Prenatal Care     33 weeks 2 days, no c/o VB, LoF, cramping/contractions. Feeling FM daily.     Call to schedule test     Needs to schedule 3 hour GTT. Failed 1 hour on 24    Imm/Inj     Declines tdap         Initial /70   Wt 121.9 kg (268 lb 12.8 oz)   LMP 05/15/2024 (Approximate)   BMI 37.49 kg/m   Estimated body mass index is 37.49 kg/m  as calculated from the following:    Height as of 24: 1.803 m (5' 11\").    Weight as of this encounter: 121.9 kg (268 lb 12.8 oz).  BP completed using cuff size: large    Questioned patient about current smoking habits.  Pt. has never smoked.          The following HM Due: NONE    Sonja Morris CMA               "

## 2025-01-28 ENCOUNTER — PRENATAL OFFICE VISIT (OUTPATIENT)
Dept: OBGYN | Facility: CLINIC | Age: 45
End: 2025-01-28
Payer: COMMERCIAL

## 2025-01-28 VITALS — DIASTOLIC BLOOD PRESSURE: 70 MMHG | WEIGHT: 269 LBS | BODY MASS INDEX: 37.52 KG/M2 | SYSTOLIC BLOOD PRESSURE: 118 MMHG

## 2025-01-28 DIAGNOSIS — Z34.80 SUPERVISION OF OTHER NORMAL PREGNANCY, ANTEPARTUM: ICD-10-CM

## 2025-01-28 DIAGNOSIS — Z86.32 HISTORY OF GESTATIONAL DIABETES: Primary | ICD-10-CM

## 2025-01-28 PROCEDURE — 99207 PR PRENATAL VISIT: CPT | Performed by: OBSTETRICS & GYNECOLOGY

## 2025-01-28 NOTE — PROGRESS NOTES
45 year old  at 36w1d     - B+/RI.  FOB Marvin.  Declines NIPT.   - IVF pregnancy, IVF was done in [place].  Normal fetal echo.  Planning serial growth and delivery 39+0 - 39+6.  -Due for pap, will do at PP visit  - H/o GDMA1 in third pregnancy, failed GCT, didn't do (and has no intentions to complete) the GTT.  Has tested FBGs on her 's glucometer, for FBG generally gets 70, 80, or 95 at highest.  For 2h postprandial she gets 140s and 130s.  She may still have a diagnosis of GDMA1 but it will likely not be proved, however I have recommended a growth US now and she is already set to do wkly BPPs  - AMA > age 40: s/p GC and MFM   - BMI 37, planning once weekly BPP after 36 wks  - GBS and cvx chk next visit    RTC wkly until delivery    Constanza House MD, MPH  [facility] OB/Gyn

## 2025-01-28 NOTE — NURSING NOTE
"Chief Complaint   Patient presents with    Prenatal Care     36 1/7 weeks       Initial /70   Wt 122 kg (269 lb)   LMP 05/15/2024 (Approximate)   BMI 37.52 kg/m   Estimated body mass index is 37.52 kg/m  as calculated from the following:    Height as of 24: 1.803 m (5' 11\").    Weight as of this encounter: 122 kg (269 lb).  BP completed using cuff size: large    Questioned patient about current smoking habits.  Pt. has never smoked.          The following HM Due: NONE  +fetal movement  Pt prefers to get GBS swab next visit  Rupali Armstrong, CMA        "

## 2025-01-29 ENCOUNTER — ANCILLARY PROCEDURE (OUTPATIENT)
Dept: ULTRASOUND IMAGING | Facility: CLINIC | Age: 45
End: 2025-01-29
Attending: OBSTETRICS & GYNECOLOGY
Payer: COMMERCIAL

## 2025-01-29 DIAGNOSIS — Z86.32 HISTORY OF GESTATIONAL DIABETES: Primary | ICD-10-CM

## 2025-01-29 PROCEDURE — 76819 FETAL BIOPHYS PROFIL W/O NST: CPT | Performed by: OBSTETRICS & GYNECOLOGY

## 2025-01-29 PROCEDURE — 76816 OB US FOLLOW-UP PER FETUS: CPT | Performed by: OBSTETRICS & GYNECOLOGY

## 2025-02-04 ENCOUNTER — PRENATAL OFFICE VISIT (OUTPATIENT)
Dept: OBGYN | Facility: CLINIC | Age: 45
End: 2025-02-04
Payer: COMMERCIAL

## 2025-02-04 VITALS
SYSTOLIC BLOOD PRESSURE: 120 MMHG | DIASTOLIC BLOOD PRESSURE: 70 MMHG | HEIGHT: 71 IN | BODY MASS INDEX: 37.24 KG/M2 | WEIGHT: 266 LBS

## 2025-02-04 DIAGNOSIS — Z34.80 SUPERVISION OF OTHER NORMAL PREGNANCY, ANTEPARTUM: ICD-10-CM

## 2025-02-04 DIAGNOSIS — Z36.85 SCREENING, ANTENATAL, FOR STREPTOCOCCUS B: Primary | ICD-10-CM

## 2025-02-04 PROCEDURE — 99207 PR PRENATAL VISIT: CPT | Performed by: OBSTETRICS & GYNECOLOGY

## 2025-02-04 PROCEDURE — 87653 STREP B DNA AMP PROBE: CPT | Performed by: OBSTETRICS & GYNECOLOGY

## 2025-02-05 ENCOUNTER — ANCILLARY PROCEDURE (OUTPATIENT)
Dept: ULTRASOUND IMAGING | Facility: CLINIC | Age: 45
End: 2025-02-05
Attending: OBSTETRICS & GYNECOLOGY
Payer: COMMERCIAL

## 2025-02-05 DIAGNOSIS — Z34.80 SUPERVISION OF OTHER NORMAL PREGNANCY, ANTEPARTUM: ICD-10-CM

## 2025-02-05 LAB — GP B STREP DNA SPEC QL NAA+PROBE: NEGATIVE

## 2025-02-05 PROCEDURE — 76819 FETAL BIOPHYS PROFIL W/O NST: CPT | Performed by: OBSTETRICS & GYNECOLOGY

## 2025-02-05 NOTE — PROGRESS NOTES
45 year old  at 37w1d     - B+/RI.  FOB Marvin.  Declines NIPT.   - IVF pregnancy, IVF was done in [place].  Normal fetal echo.  Planning serial growth and delivery 39+0 - 39+6.  Will schedule for  (scheduling will occur when we are within one week of the date)  -Due for pap, will do at PP visit  - H/o GDMA1 in third pregnancy, failed GCT, didn't do (and has no intentions to complete) the GTT.  Has tested FBGs on her 's glucometer, for FBG generally gets 70, 80, or 95 at highest.  For 2h postprandial she gets 140s and 130s.  She may still have a diagnosis of GDMA1 but it will likely not be proved, normal growth at 36 wks, and she is already set to do wkly BPPs  - AMA > age 40: s/p GC and MFM   - BMI 37, planning once weekly BPP after 36 wks  - GBS and cvx chk today    RTC 1 wk    Constanza House MD, MPH  [facility] OB/Gyn

## 2025-02-09 NOTE — DISCHARGE SUMMARY
[facility] OB Postpartum/Discharge Note    S:  Patient without complaints.  Minimal lochia.    O:  Blood pressure (!) 146/84, pulse 80, temperature 97.3  F (36.3  C), temperature source Oral, resp. rate 20, last menstrual period 12/15/2021, SpO2 100 %, unknown if currently breastfeeding.        Urine output adequate        Abdomen - Fundus firm, at umbilicus, nontender        Extremities - No calf tenderness    Hemoglobin   Date Value Ref Range Status   09/13/2022 8.8 (L) 11.7 - 15.7 g/dL Final   08/21/2014 12.0 11.7 - 15.7 g/dL Final   ]      A:   Postpartum Day# 2, s/p Vaginal delivery - doing well        Postpartum Acute Blood loss anemia - due to typical intrapartum acute blood loss, no sx's, no specific treatment required before discharge home        GDM A1 - BS's WNL      P:  1)  Discharge home        2)  F/U 6 weeks w/ Primary OB.  2 hr GTT at 6 weeks PP.        3)  Discharge meds: Motrin, Fe sulfate every day x 2 weeks, Raz Yepez MD           Principal Discharge DX:	Threatened    1

## 2025-02-11 ENCOUNTER — PRENATAL OFFICE VISIT (OUTPATIENT)
Dept: OBGYN | Facility: CLINIC | Age: 45
End: 2025-02-11
Payer: COMMERCIAL

## 2025-02-11 VITALS — SYSTOLIC BLOOD PRESSURE: 126 MMHG | WEIGHT: 269 LBS | BODY MASS INDEX: 37.52 KG/M2 | DIASTOLIC BLOOD PRESSURE: 78 MMHG

## 2025-02-11 DIAGNOSIS — O09.529 HIGH-RISK PREGNANCY, ELDERLY MULTIGRAVIDA, UNSPECIFIED TRIMESTER: Primary | ICD-10-CM

## 2025-02-11 DIAGNOSIS — O09.523 MULTIGRAVIDA OF ADVANCED MATERNAL AGE IN THIRD TRIMESTER: ICD-10-CM

## 2025-02-11 PROCEDURE — 99207 PR PRENATAL VISIT: CPT | Performed by: OBSTETRICS & GYNECOLOGY

## 2025-02-11 NOTE — PROGRESS NOTES
Routine obstetric visit at 38w1d         - IVF pregnancy, IVF was done in [place].  Normal fetal echo.  Planning serial growth and delivery 39+0 - 39+6.  Will schedule for 2/18 (scheduling will occur when we are within one week of the date)  -Due for pap, will do at PP visit  - H/o GDMA1 in third pregnancy, failed GCT, didn't do (and has no intentions to complete) the GTT.  Has tested FBGs on her 's glucometer, for FBG generally gets 70, 80, or 95 at highest.  For 2h postprandial she gets 140s and 130s.  She may still have a diagnosis of GDMA1 but it will likely not be proved, normal growth at 36 wks, and she is already set to do wkly BPPs  - AMA > age 40: s/p GC and MFM   - BMI 37, planning once weekly BPP after 36 wks        Discussed induction of labor for 2/18 as per Dr. Lisset House.  Scheduled on L&D. Should have admission hgb A1C.  Discussed pitocin induction of labor, no questions.    Christie Garg MD  [facility] Obstetrics and Gynecology

## 2025-02-12 ENCOUNTER — ANCILLARY PROCEDURE (OUTPATIENT)
Dept: ULTRASOUND IMAGING | Facility: CLINIC | Age: 45
End: 2025-02-12
Attending: OBSTETRICS & GYNECOLOGY
Payer: COMMERCIAL

## 2025-02-12 DIAGNOSIS — Z34.80 SUPERVISION OF OTHER NORMAL PREGNANCY, ANTEPARTUM: ICD-10-CM

## 2025-02-12 PROCEDURE — 76819 FETAL BIOPHYS PROFIL W/O NST: CPT | Performed by: OBSTETRICS & GYNECOLOGY

## 2025-02-18 ENCOUNTER — ANESTHESIA (OUTPATIENT)
Dept: OBGYN | Facility: CLINIC | Age: 45
End: 2025-02-18
Payer: COMMERCIAL

## 2025-02-18 ENCOUNTER — HOSPITAL ENCOUNTER (INPATIENT)
Facility: CLINIC | Age: 45
LOS: 2 days | Discharge: HOME HEALTH | End: 2025-02-20
Attending: OBSTETRICS & GYNECOLOGY | Admitting: OBSTETRICS & GYNECOLOGY
Payer: COMMERCIAL

## 2025-02-18 ENCOUNTER — ANESTHESIA EVENT (OUTPATIENT)
Dept: OBGYN | Facility: CLINIC | Age: 45
End: 2025-02-18
Payer: COMMERCIAL

## 2025-02-18 DIAGNOSIS — O14.90 PRE-ECLAMPSIA, ANTEPARTUM: ICD-10-CM

## 2025-02-18 LAB
ABO + RH BLD: NORMAL
ALBUMIN MFR UR ELPH: 11.3 MG/DL
ALBUMIN SERPL BCG-MCNC: 3 G/DL (ref 3.5–5.2)
ALP SERPL-CCNC: 160 U/L (ref 40–150)
ALT SERPL W P-5'-P-CCNC: 11 U/L (ref 0–50)
ANION GAP SERPL CALCULATED.3IONS-SCNC: 11 MMOL/L (ref 7–15)
AST SERPL W P-5'-P-CCNC: 28 U/L (ref 0–45)
BILIRUB SERPL-MCNC: 0.3 MG/DL
BLD GP AB SCN SERPL QL: NEGATIVE
BUN SERPL-MCNC: 4.9 MG/DL (ref 6–20)
CALCIUM SERPL-MCNC: 8.4 MG/DL (ref 8.8–10.4)
CHLORIDE SERPL-SCNC: 102 MMOL/L (ref 98–107)
CREAT SERPL-MCNC: 0.58 MG/DL (ref 0.51–0.95)
CREAT UR-MCNC: 37.9 MG/DL
EGFRCR SERPLBLD CKD-EPI 2021: >90 ML/MIN/1.73M2
ERYTHROCYTE [DISTWIDTH] IN BLOOD BY AUTOMATED COUNT: 13.8 % (ref 10–15)
ERYTHROCYTE [DISTWIDTH] IN BLOOD BY AUTOMATED COUNT: 13.9 % (ref 10–15)
EST. AVERAGE GLUCOSE BLD GHB EST-MCNC: 131 MG/DL
GLUCOSE BLDC GLUCOMTR-MCNC: 86 MG/DL (ref 70–99)
GLUCOSE SERPL-MCNC: 82 MG/DL (ref 70–99)
HBA1C MFR BLD: 6.2 %
HCO3 SERPL-SCNC: 21 MMOL/L (ref 22–29)
HCT VFR BLD AUTO: 31.4 % (ref 35–47)
HCT VFR BLD AUTO: 32 % (ref 35–47)
HGB BLD-MCNC: 10.4 G/DL (ref 11.7–15.7)
HGB BLD-MCNC: 10.9 G/DL (ref 11.7–15.7)
MCH RBC QN AUTO: 28.7 PG (ref 26.5–33)
MCH RBC QN AUTO: 29.5 PG (ref 26.5–33)
MCHC RBC AUTO-ENTMCNC: 33.1 G/DL (ref 31.5–36.5)
MCHC RBC AUTO-ENTMCNC: 34.1 G/DL (ref 31.5–36.5)
MCV RBC AUTO: 87 FL (ref 78–100)
MCV RBC AUTO: 87 FL (ref 78–100)
PLATELET # BLD AUTO: 269 10E3/UL (ref 150–450)
PLATELET # BLD AUTO: 275 10E3/UL (ref 150–450)
POTASSIUM SERPL-SCNC: 3.9 MMOL/L (ref 3.4–5.3)
PROT SERPL-MCNC: 5.9 G/DL (ref 6.4–8.3)
PROT/CREAT 24H UR: 0.3 MG/MG CR (ref 0–0.2)
RBC # BLD AUTO: 3.63 10E6/UL (ref 3.8–5.2)
RBC # BLD AUTO: 3.7 10E6/UL (ref 3.8–5.2)
SODIUM SERPL-SCNC: 134 MMOL/L (ref 135–145)
SPECIMEN EXP DATE BLD: NORMAL
T PALLIDUM AB SER QL: NONREACTIVE
WBC # BLD AUTO: 7.3 10E3/UL (ref 4–11)
WBC # BLD AUTO: 9.5 10E3/UL (ref 4–11)

## 2025-02-18 PROCEDURE — 250N000009 HC RX 250: Performed by: OBSTETRICS & GYNECOLOGY

## 2025-02-18 PROCEDURE — 82962 GLUCOSE BLOOD TEST: CPT

## 2025-02-18 PROCEDURE — 36415 COLL VENOUS BLD VENIPUNCTURE: CPT | Performed by: OBSTETRICS & GYNECOLOGY

## 2025-02-18 PROCEDURE — 00HU33Z INSERTION OF INFUSION DEVICE INTO SPINAL CANAL, PERCUTANEOUS APPROACH: ICD-10-PCS | Performed by: ANESTHESIOLOGY

## 2025-02-18 PROCEDURE — 82040 ASSAY OF SERUM ALBUMIN: CPT | Performed by: OBSTETRICS & GYNECOLOGY

## 2025-02-18 PROCEDURE — 258N000003 HC RX IP 258 OP 636: Performed by: OBSTETRICS & GYNECOLOGY

## 2025-02-18 PROCEDURE — 250N000011 HC RX IP 250 OP 636: Performed by: OBSTETRICS & GYNECOLOGY

## 2025-02-18 PROCEDURE — 86900 BLOOD TYPING SEROLOGIC ABO: CPT | Performed by: OBSTETRICS & GYNECOLOGY

## 2025-02-18 PROCEDURE — 85041 AUTOMATED RBC COUNT: CPT | Performed by: OBSTETRICS & GYNECOLOGY

## 2025-02-18 PROCEDURE — 120N000013 HC R&B IMCU

## 2025-02-18 PROCEDURE — 84156 ASSAY OF PROTEIN URINE: CPT | Performed by: OBSTETRICS & GYNECOLOGY

## 2025-02-18 PROCEDURE — 80051 ELECTROLYTE PANEL: CPT | Performed by: OBSTETRICS & GYNECOLOGY

## 2025-02-18 PROCEDURE — 0HQ9XZZ REPAIR PERINEUM SKIN, EXTERNAL APPROACH: ICD-10-PCS | Performed by: OBSTETRICS & GYNECOLOGY

## 2025-02-18 PROCEDURE — 3E0R3BZ INTRODUCTION OF ANESTHETIC AGENT INTO SPINAL CANAL, PERCUTANEOUS APPROACH: ICD-10-PCS | Performed by: ANESTHESIOLOGY

## 2025-02-18 PROCEDURE — 85018 HEMOGLOBIN: CPT | Performed by: OBSTETRICS & GYNECOLOGY

## 2025-02-18 PROCEDURE — 86780 TREPONEMA PALLIDUM: CPT | Performed by: OBSTETRICS & GYNECOLOGY

## 2025-02-18 PROCEDURE — 10907ZC DRAINAGE OF AMNIOTIC FLUID, THERAPEUTIC FROM PRODUCTS OF CONCEPTION, VIA NATURAL OR ARTIFICIAL OPENING: ICD-10-PCS | Performed by: OBSTETRICS & GYNECOLOGY

## 2025-02-18 PROCEDURE — 722N000001 HC LABOR CARE VAGINAL DELIVERY SINGLE

## 2025-02-18 PROCEDURE — 83036 HEMOGLOBIN GLYCOSYLATED A1C: CPT | Performed by: OBSTETRICS & GYNECOLOGY

## 2025-02-18 PROCEDURE — 250N000013 HC RX MED GY IP 250 OP 250 PS 637: Performed by: OBSTETRICS & GYNECOLOGY

## 2025-02-18 PROCEDURE — 250N000011 HC RX IP 250 OP 636: Performed by: ANESTHESIOLOGY

## 2025-02-18 PROCEDURE — 82565 ASSAY OF CREATININE: CPT | Performed by: OBSTETRICS & GYNECOLOGY

## 2025-02-18 PROCEDURE — 250N000011 HC RX IP 250 OP 636: Mod: JZ | Performed by: ANESTHESIOLOGY

## 2025-02-18 PROCEDURE — 370N000003 HC ANESTHESIA WARD SERVICE: Performed by: ANESTHESIOLOGY

## 2025-02-18 PROCEDURE — 999N000016 HC STATISTIC ATTENDANCE AT DELIVERY

## 2025-02-18 PROCEDURE — 59400 OBSTETRICAL CARE: CPT | Performed by: OBSTETRICS & GYNECOLOGY

## 2025-02-18 RX ORDER — ONDANSETRON 4 MG/1
4 TABLET, ORALLY DISINTEGRATING ORAL EVERY 6 HOURS PRN
Status: DISCONTINUED | OUTPATIENT
Start: 2025-02-18 | End: 2025-02-18

## 2025-02-18 RX ORDER — OXYTOCIN/0.9 % SODIUM CHLORIDE 30/500 ML
100-340 PLASTIC BAG, INJECTION (ML) INTRAVENOUS CONTINUOUS PRN
Status: DISCONTINUED | OUTPATIENT
Start: 2025-02-18 | End: 2025-02-19

## 2025-02-18 RX ORDER — NALOXONE HYDROCHLORIDE 0.4 MG/ML
0.4 INJECTION, SOLUTION INTRAMUSCULAR; INTRAVENOUS; SUBCUTANEOUS
Status: DISCONTINUED | OUTPATIENT
Start: 2025-02-18 | End: 2025-02-19

## 2025-02-18 RX ORDER — LOPERAMIDE HYDROCHLORIDE 2 MG/1
2 CAPSULE ORAL
Status: DISCONTINUED | OUTPATIENT
Start: 2025-02-18 | End: 2025-02-18 | Stop reason: HOSPADM

## 2025-02-18 RX ORDER — LOPERAMIDE HYDROCHLORIDE 2 MG/1
2 CAPSULE ORAL
Status: DISCONTINUED | OUTPATIENT
Start: 2025-02-18 | End: 2025-02-18

## 2025-02-18 RX ORDER — ACETAMINOPHEN 325 MG/1
650 TABLET ORAL EVERY 4 HOURS PRN
Status: DISCONTINUED | OUTPATIENT
Start: 2025-02-18 | End: 2025-02-20 | Stop reason: HOSPADM

## 2025-02-18 RX ORDER — CARBOPROST TROMETHAMINE 250 UG/ML
250 INJECTION, SOLUTION INTRAMUSCULAR
Status: DISCONTINUED | OUTPATIENT
Start: 2025-02-18 | End: 2025-02-18

## 2025-02-18 RX ORDER — ONDANSETRON 2 MG/ML
4 INJECTION INTRAMUSCULAR; INTRAVENOUS EVERY 6 HOURS PRN
Status: DISCONTINUED | OUTPATIENT
Start: 2025-02-18 | End: 2025-02-18 | Stop reason: HOSPADM

## 2025-02-18 RX ORDER — BUPIVACAINE HYDROCHLORIDE 2.5 MG/ML
INJECTION, SOLUTION EPIDURAL; INFILTRATION; INTRACAUDAL
Status: COMPLETED | OUTPATIENT
Start: 2025-02-18 | End: 2025-02-18

## 2025-02-18 RX ORDER — LOPERAMIDE HYDROCHLORIDE 2 MG/1
2 CAPSULE ORAL
Status: DISCONTINUED | OUTPATIENT
Start: 2025-02-18 | End: 2025-02-20 | Stop reason: HOSPADM

## 2025-02-18 RX ORDER — KETOROLAC TROMETHAMINE 15 MG/ML
15 INJECTION, SOLUTION INTRAMUSCULAR; INTRAVENOUS
Status: DISCONTINUED | OUTPATIENT
Start: 2025-02-18 | End: 2025-02-18

## 2025-02-18 RX ORDER — OXYTOCIN 10 [USP'U]/ML
10 INJECTION, SOLUTION INTRAMUSCULAR; INTRAVENOUS
Status: DISCONTINUED | OUTPATIENT
Start: 2025-02-18 | End: 2025-02-18 | Stop reason: HOSPADM

## 2025-02-18 RX ORDER — ONDANSETRON 4 MG/1
4 TABLET, ORALLY DISINTEGRATING ORAL EVERY 6 HOURS PRN
Status: DISCONTINUED | OUTPATIENT
Start: 2025-02-18 | End: 2025-02-18 | Stop reason: HOSPADM

## 2025-02-18 RX ORDER — TRANEXAMIC ACID 10 MG/ML
1 INJECTION, SOLUTION INTRAVENOUS EVERY 30 MIN PRN
Status: DISCONTINUED | OUTPATIENT
Start: 2025-02-18 | End: 2025-02-20 | Stop reason: HOSPADM

## 2025-02-18 RX ORDER — TRANEXAMIC ACID 10 MG/ML
1 INJECTION, SOLUTION INTRAVENOUS EVERY 30 MIN PRN
Status: DISCONTINUED | OUTPATIENT
Start: 2025-02-18 | End: 2025-02-18 | Stop reason: HOSPADM

## 2025-02-18 RX ORDER — OXYTOCIN 10 [USP'U]/ML
10 INJECTION, SOLUTION INTRAMUSCULAR; INTRAVENOUS
Status: DISCONTINUED | OUTPATIENT
Start: 2025-02-18 | End: 2025-02-19

## 2025-02-18 RX ORDER — KETOROLAC TROMETHAMINE 15 MG/ML
15 INJECTION, SOLUTION INTRAMUSCULAR; INTRAVENOUS
Status: DISCONTINUED | OUTPATIENT
Start: 2025-02-18 | End: 2025-02-18 | Stop reason: HOSPADM

## 2025-02-18 RX ORDER — LOPERAMIDE HYDROCHLORIDE 2 MG/1
4 CAPSULE ORAL
Status: DISCONTINUED | OUTPATIENT
Start: 2025-02-18 | End: 2025-02-18 | Stop reason: HOSPADM

## 2025-02-18 RX ORDER — IBUPROFEN 800 MG/1
800 TABLET, FILM COATED ORAL
Status: DISCONTINUED | OUTPATIENT
Start: 2025-02-18 | End: 2025-02-18 | Stop reason: HOSPADM

## 2025-02-18 RX ORDER — OXYTOCIN/0.9 % SODIUM CHLORIDE 30/500 ML
340 PLASTIC BAG, INJECTION (ML) INTRAVENOUS CONTINUOUS PRN
Status: DISCONTINUED | OUTPATIENT
Start: 2025-02-18 | End: 2025-02-18 | Stop reason: HOSPADM

## 2025-02-18 RX ORDER — BISACODYL 10 MG
10 SUPPOSITORY, RECTAL RECTAL DAILY PRN
Status: DISCONTINUED | OUTPATIENT
Start: 2025-02-18 | End: 2025-02-20 | Stop reason: HOSPADM

## 2025-02-18 RX ORDER — NALBUPHINE HYDROCHLORIDE 10 MG/ML
2.5-5 INJECTION INTRAMUSCULAR; INTRAVENOUS; SUBCUTANEOUS EVERY 6 HOURS PRN
Status: DISCONTINUED | OUTPATIENT
Start: 2025-02-18 | End: 2025-02-19

## 2025-02-18 RX ORDER — OXYTOCIN/0.9 % SODIUM CHLORIDE 30/500 ML
1-24 PLASTIC BAG, INJECTION (ML) INTRAVENOUS CONTINUOUS
Status: DISCONTINUED | OUTPATIENT
Start: 2025-02-18 | End: 2025-02-18 | Stop reason: HOSPADM

## 2025-02-18 RX ORDER — IBUPROFEN 800 MG/1
800 TABLET, FILM COATED ORAL EVERY 6 HOURS PRN
Status: DISCONTINUED | OUTPATIENT
Start: 2025-02-18 | End: 2025-02-20 | Stop reason: HOSPADM

## 2025-02-18 RX ORDER — CARBOPROST TROMETHAMINE 250 UG/ML
250 INJECTION, SOLUTION INTRAMUSCULAR
Status: DISCONTINUED | OUTPATIENT
Start: 2025-02-18 | End: 2025-02-18 | Stop reason: HOSPADM

## 2025-02-18 RX ORDER — NALOXONE HYDROCHLORIDE 0.4 MG/ML
0.2 INJECTION, SOLUTION INTRAMUSCULAR; INTRAVENOUS; SUBCUTANEOUS
Status: DISCONTINUED | OUTPATIENT
Start: 2025-02-18 | End: 2025-02-19

## 2025-02-18 RX ORDER — OXYTOCIN 10 [USP'U]/ML
10 INJECTION, SOLUTION INTRAMUSCULAR; INTRAVENOUS
Status: DISCONTINUED | OUTPATIENT
Start: 2025-02-18 | End: 2025-02-20 | Stop reason: HOSPADM

## 2025-02-18 RX ORDER — MISOPROSTOL 200 UG/1
400 TABLET ORAL
Status: DISCONTINUED | OUTPATIENT
Start: 2025-02-18 | End: 2025-02-18 | Stop reason: HOSPADM

## 2025-02-18 RX ORDER — METOCLOPRAMIDE 10 MG/1
10 TABLET ORAL EVERY 6 HOURS PRN
Status: DISCONTINUED | OUTPATIENT
Start: 2025-02-18 | End: 2025-02-18 | Stop reason: HOSPADM

## 2025-02-18 RX ORDER — METHYLERGONOVINE MALEATE 0.2 MG/ML
200 INJECTION INTRAVENOUS
Status: DISCONTINUED | OUTPATIENT
Start: 2025-02-18 | End: 2025-02-18 | Stop reason: HOSPADM

## 2025-02-18 RX ORDER — POLYETHYLENE GLYCOL 3350 17 G/17G
17 POWDER, FOR SOLUTION ORAL DAILY PRN
Status: DISCONTINUED | OUTPATIENT
Start: 2025-02-18 | End: 2025-02-20 | Stop reason: HOSPADM

## 2025-02-18 RX ORDER — ONDANSETRON 2 MG/ML
4 INJECTION INTRAMUSCULAR; INTRAVENOUS EVERY 6 HOURS PRN
Status: DISCONTINUED | OUTPATIENT
Start: 2025-02-18 | End: 2025-02-18

## 2025-02-18 RX ORDER — IBUPROFEN 800 MG/1
800 TABLET, FILM COATED ORAL
Status: DISCONTINUED | OUTPATIENT
Start: 2025-02-18 | End: 2025-02-18

## 2025-02-18 RX ORDER — METOCLOPRAMIDE HYDROCHLORIDE 5 MG/ML
10 INJECTION INTRAMUSCULAR; INTRAVENOUS EVERY 6 HOURS PRN
Status: DISCONTINUED | OUTPATIENT
Start: 2025-02-18 | End: 2025-02-18 | Stop reason: HOSPADM

## 2025-02-18 RX ORDER — SODIUM CHLORIDE, SODIUM LACTATE, POTASSIUM CHLORIDE, CALCIUM CHLORIDE 600; 310; 30; 20 MG/100ML; MG/100ML; MG/100ML; MG/100ML
INJECTION, SOLUTION INTRAVENOUS CONTINUOUS
Status: DISCONTINUED | OUTPATIENT
Start: 2025-02-18 | End: 2025-02-18

## 2025-02-18 RX ORDER — MISOPROSTOL 200 UG/1
400 TABLET ORAL
Status: DISCONTINUED | OUTPATIENT
Start: 2025-02-18 | End: 2025-02-20 | Stop reason: HOSPADM

## 2025-02-18 RX ORDER — FENTANYL CITRATE-0.9 % NACL/PF 10 MCG/ML
100 PLASTIC BAG, INJECTION (ML) INTRAVENOUS EVERY 5 MIN PRN
Status: DISCONTINUED | OUTPATIENT
Start: 2025-02-18 | End: 2025-02-18 | Stop reason: HOSPADM

## 2025-02-18 RX ORDER — HYDROCORTISONE 25 MG/G
CREAM TOPICAL 3 TIMES DAILY PRN
Status: DISCONTINUED | OUTPATIENT
Start: 2025-02-18 | End: 2025-02-20 | Stop reason: HOSPADM

## 2025-02-18 RX ORDER — LIDOCAINE 40 MG/G
CREAM TOPICAL
Status: DISCONTINUED | OUTPATIENT
Start: 2025-02-18 | End: 2025-02-18 | Stop reason: HOSPADM

## 2025-02-18 RX ORDER — METHYLERGONOVINE MALEATE 0.2 MG/ML
200 INJECTION INTRAVENOUS
Status: DISCONTINUED | OUTPATIENT
Start: 2025-02-18 | End: 2025-02-20 | Stop reason: HOSPADM

## 2025-02-18 RX ORDER — MISOPROSTOL 200 UG/1
800 TABLET ORAL
Status: DISCONTINUED | OUTPATIENT
Start: 2025-02-18 | End: 2025-02-18 | Stop reason: HOSPADM

## 2025-02-18 RX ORDER — LOPERAMIDE HYDROCHLORIDE 2 MG/1
4 CAPSULE ORAL
Status: DISCONTINUED | OUTPATIENT
Start: 2025-02-18 | End: 2025-02-18

## 2025-02-18 RX ORDER — SODIUM CHLORIDE, SODIUM LACTATE, POTASSIUM CHLORIDE, CALCIUM CHLORIDE 600; 310; 30; 20 MG/100ML; MG/100ML; MG/100ML; MG/100ML
INJECTION, SOLUTION INTRAVENOUS CONTINUOUS PRN
Status: DISCONTINUED | OUTPATIENT
Start: 2025-02-18 | End: 2025-02-18

## 2025-02-18 RX ORDER — BUPIVACAINE HYDROCHLORIDE 2.5 MG/ML
0.25 INJECTION, SOLUTION EPIDURAL; INFILTRATION; INTRACAUDAL ONCE
Status: DISCONTINUED | OUTPATIENT
Start: 2025-02-18 | End: 2025-02-18 | Stop reason: HOSPADM

## 2025-02-18 RX ORDER — CITRIC ACID/SODIUM CITRATE 334-500MG
30 SOLUTION, ORAL ORAL
Status: DISCONTINUED | OUTPATIENT
Start: 2025-02-18 | End: 2025-02-18 | Stop reason: HOSPADM

## 2025-02-18 RX ORDER — PROCHLORPERAZINE MALEATE 10 MG
10 TABLET ORAL EVERY 6 HOURS PRN
Status: DISCONTINUED | OUTPATIENT
Start: 2025-02-18 | End: 2025-02-18 | Stop reason: HOSPADM

## 2025-02-18 RX ORDER — SODIUM CHLORIDE, SODIUM LACTATE, POTASSIUM CHLORIDE, CALCIUM CHLORIDE 600; 310; 30; 20 MG/100ML; MG/100ML; MG/100ML; MG/100ML
INJECTION, SOLUTION INTRAVENOUS CONTINUOUS
Status: DISCONTINUED | OUTPATIENT
Start: 2025-02-18 | End: 2025-02-18 | Stop reason: HOSPADM

## 2025-02-18 RX ORDER — OXYTOCIN/0.9 % SODIUM CHLORIDE 30/500 ML
340 PLASTIC BAG, INJECTION (ML) INTRAVENOUS CONTINUOUS PRN
Status: DISCONTINUED | OUTPATIENT
Start: 2025-02-18 | End: 2025-02-20 | Stop reason: HOSPADM

## 2025-02-18 RX ORDER — MISOPROSTOL 200 UG/1
800 TABLET ORAL
Status: DISCONTINUED | OUTPATIENT
Start: 2025-02-18 | End: 2025-02-20 | Stop reason: HOSPADM

## 2025-02-18 RX ORDER — LOPERAMIDE HYDROCHLORIDE 2 MG/1
4 CAPSULE ORAL
Status: DISCONTINUED | OUTPATIENT
Start: 2025-02-18 | End: 2025-02-20 | Stop reason: HOSPADM

## 2025-02-18 RX ORDER — CITRIC ACID/SODIUM CITRATE 334-500MG
30 SOLUTION, ORAL ORAL
Status: DISCONTINUED | OUTPATIENT
Start: 2025-02-18 | End: 2025-02-18

## 2025-02-18 RX ORDER — CALCIUM CARBONATE 500 MG/1
1000 TABLET, CHEWABLE ORAL 3 TIMES DAILY PRN
Status: DISCONTINUED | OUTPATIENT
Start: 2025-02-18 | End: 2025-02-19

## 2025-02-18 RX ORDER — AMOXICILLIN 250 MG
2 CAPSULE ORAL
Status: DISCONTINUED | OUTPATIENT
Start: 2025-02-18 | End: 2025-02-20 | Stop reason: HOSPADM

## 2025-02-18 RX ORDER — CARBOPROST TROMETHAMINE 250 UG/ML
250 INJECTION, SOLUTION INTRAMUSCULAR
Status: DISCONTINUED | OUTPATIENT
Start: 2025-02-18 | End: 2025-02-20 | Stop reason: HOSPADM

## 2025-02-18 RX ORDER — PROCHLORPERAZINE MALEATE 10 MG
10 TABLET ORAL EVERY 6 HOURS PRN
Status: DISCONTINUED | OUTPATIENT
Start: 2025-02-18 | End: 2025-02-18

## 2025-02-18 RX ADMIN — Medication: at 15:57

## 2025-02-18 RX ADMIN — Medication 2 MILLI-UNITS/MIN: at 15:56

## 2025-02-18 RX ADMIN — SODIUM CHLORIDE, POTASSIUM CHLORIDE, SODIUM LACTATE AND CALCIUM CHLORIDE 500 ML: 600; 310; 30; 20 INJECTION, SOLUTION INTRAVENOUS at 15:37

## 2025-02-18 RX ADMIN — KETOROLAC TROMETHAMINE 15 MG: 15 INJECTION, SOLUTION INTRAMUSCULAR; INTRAVENOUS at 21:19

## 2025-02-18 RX ADMIN — CALCIUM CARBONATE (ANTACID) CHEW TAB 500 MG 1000 MG: 500 CHEW TAB at 20:02

## 2025-02-18 RX ADMIN — BUPIVACAINE HYDROCHLORIDE 10 ML: 2.5 INJECTION, SOLUTION EPIDURAL; INFILTRATION; INTRACAUDAL at 15:53

## 2025-02-18 RX ADMIN — SODIUM CHLORIDE, POTASSIUM CHLORIDE, SODIUM LACTATE AND CALCIUM CHLORIDE: 600; 310; 30; 20 INJECTION, SOLUTION INTRAVENOUS at 14:47

## 2025-02-18 RX ADMIN — BUPIVACAINE HYDROCHLORIDE 10 ML: 2.5 INJECTION, SOLUTION EPIDURAL; INFILTRATION; INTRACAUDAL at 19:52

## 2025-02-18 RX ADMIN — Medication 340 ML/HR: at 20:58

## 2025-02-18 ASSESSMENT — ACTIVITIES OF DAILY LIVING (ADL)
ADLS_ACUITY_SCORE: 28
ADLS_ACUITY_SCORE: 27
ADLS_ACUITY_SCORE: 27
ADLS_ACUITY_SCORE: 28
ADLS_ACUITY_SCORE: 28
ADLS_ACUITY_SCORE: 27
ADLS_ACUITY_SCORE: 28

## 2025-02-18 NOTE — ANESTHESIA PREPROCEDURE EVALUATION
Anesthesia Pre-Procedure Evaluation    Patient: Manuel Phillip   MRN: [id number] : 1980        Procedure :           Past Medical History:   Diagnosis Date    Asthma 2022    with illness    Female infertility     No pertinent past medical history 2022    Vaginal delivery 2022    Varicella     White classification A1 gestational diabetes mellitus (GDM), diet controlled 2022      Past Surgical History:   Procedure Laterality Date    CHOLECYSTECTOMY, LAPOROSCOPIC  3/09    ZC GENITAL SURG PROC, FEMALE UNLISTED      female circumcision      Allergies   Allergen Reactions    No Known Drug Allergy       Social History     Tobacco Use    Smoking status: Never     Passive exposure: Never    Smokeless tobacco: Never   Substance Use Topics    Alcohol use: No      Wt Readings from Last 1 Encounters:   25 120.4 kg (265 lb 8 oz)        Anesthesia Evaluation            ROS/MED HX  ENT/Pulmonary:    (-) asthma   Neurologic:  - neg neurologic ROS     Cardiovascular:    (-) PIH   METS/Exercise Tolerance:     Hematologic:     (+)  no anticoagulation therapy, no coagulopathy,             Musculoskeletal:       GI/Hepatic:     (+) GERD,                   Renal/Genitourinary:       Endo:     (+)  type II DM,             Obesity,       Psychiatric/Substance Use:       Infectious Disease:       Malignancy:       Other:            Physical Exam    Airway        Mallampati: II   TM distance: > 3 FB   Neck ROM: full     Respiratory Devices and Support         Dental  no notable dental history         Cardiovascular   cardiovascular exam normal          Pulmonary   pulmonary exam normal                OUTSIDE LABS:  CBC:   Lab Results   Component Value Date    WBC 7.3 2025    WBC 7.9 2024    HGB 10.9 (L) 2025    HGB 11.0 (L) 2024    HCT 32.0 (L) 2025    HCT 31.3 (L) 2024     2025     2024     BMP:   Lab Results   Component Value Date      "05/09/2023     10/12/2022    POTASSIUM 3.9 05/09/2023    POTASSIUM 4.2 10/12/2022    CHLORIDE 105 05/09/2023    CHLORIDE 106 10/12/2022    CO2 26 05/09/2023    CO2 23 10/12/2022    BUN 14.5 05/09/2023    BUN 15 10/12/2022    CR 0.83 05/09/2023    CR 0.82 10/12/2022    GLC 86 02/18/2025     (H) 05/09/2023     COAGS: No results found for: \"PTT\", \"INR\", \"FIBR\"  POC:   Lab Results   Component Value Date    HCG Negative 12/06/2011     HEPATIC:   Lab Results   Component Value Date    ALBUMIN 4.1 05/09/2023    PROTTOTAL 7.3 05/09/2023    ALT 13 05/09/2023    AST 25 05/09/2023    ALKPHOS 73 05/09/2023    BILITOTAL 0.3 05/09/2023     OTHER:   Lab Results   Component Value Date    BJ 9.0 05/09/2023    LIPASE 27 05/09/2023    AMYLASE 47 03/01/2010    TSH 1.43 02/26/2013       Anesthesia Plan    ASA Status:  2       Anesthesia Type: Epidural.              Consents    Anesthesia Plan(s) and associated risks, benefits, and realistic alternatives discussed. Questions answered and patient/representative(s) expressed understanding.     - Discussed:     - Discussed with:  Patient            Postoperative Care            Comments:    Other Comments: Orders to manage the epidural infusion have been entered, and through coordination with the nurse, we will continute to manage and monitor the patient's labor epidural.  We will continuously be available to adjust as needed thruout the entire L&D process.            Alysha Arroyo MD, MD    I have reviewed the pertinent notes and labs in the chart from the past 30 days and (re)examined the patient.  Any updates or changes from those notes are reflected in this note.    Clinically Significant Risk Factors Present on Admission                        # Anemia: based on hgb <11           # Asthma: noted on problem list          "

## 2025-02-18 NOTE — ANESTHESIA PROCEDURE NOTES
"Epidural catheter Procedure Note    Pre-Procedure   Staff -        Anesthesiologist:  Alysha Arroyo MD       Performed By: anesthesiologist       Location: OB       Pre-Anesthestic Checklist: patient identified, IV checked, risks and benefits discussed, informed consent, monitors and equipment checked, pre-op evaluation and at physician/surgeon's request  Timeout:       Correct Patient: Yes        Correct Procedure: Yes        Correct Site: Yes        Correct Position: Yes   Procedure Documentation  Procedure: epidural catheter         Patient Position: sitting       Patient Prep/Sterile Barriers: sterile gloves, mask, patient draped       Skin prep: Betadine       Local skin infiltrated with 3 mL of 1% lidocaine.        Insertion Site: L3-4. (midline approach).       Technique: LORT saline        UYEN at 8 cm.       Needle Type: Partly [employer] needle       Needle Gauge: 17.        Needle Length (Inches): 3.5        Catheter: 19 G.          Catheter threaded easily.         5 cm epidural space.         Threaded 13 cm at skin.         # of attempts: 1 and  # of redirects:          : 0.    Assessment/Narrative         Paresthesias: No.       Test dose of 3 mL lidocaine 1.5% w/ 1:200,000 epinephrine at.         Test dose negative, 3 minutes after injection, for signs of intravascular, subdural, or intrathecal injection.       Insertion/Infusion Method: LORT saline       Aspiration negative for Heme or CSF via Epidural Catheter.    Medication(s) Administered   0.25% Bupivacaine PF (Epidural) - EPIDURAL   10 mL - 2/18/2025 3:53:00 PM   Comments:  Pt tolerated well.   Immediately to supine with DEVON.   FHTs stable post-procedure.   No complications.       FOR [facility] ([facility] [place]) ONLY:   Pain Team Contact information: please page the Pain Team Via [employer]. Search \"Pain\". During daytime hours, please page the attending first. At night please page the resident first.      "

## 2025-02-18 NOTE — PROVIDER NOTIFICATION
02/18/25 1701   Provider Notification   Provider Name/Title MD Lisset House   Method of Notification Electronic Page     RN sent electronic page to MD with status update. PT comfortable with her epidural. Last SVE 4/80/-3. RN monitoring Pitocin and infusing per plan of care.

## 2025-02-18 NOTE — H&P
H&P Update 2025     No significant change in general health status based on examination of the patient, review of Nursing Admission Database and prenatal record.    45 year old  at 39w1d presented for IOL for IVF and AMA.  EFW AGA, 48%ile, pelvis proven to 9#.  Failed GCT, never did follow-up GTT, did some home monitoring of BGs with 's device.  Planning Hgba1c now.  BG on admit was 86.     Cvx 3.5/60/-3 AROM clear fluid  Planning pitocin 2x2  Epidural when desired    Admit for IOL    Constanza House MD, MPH  [facility]

## 2025-02-18 NOTE — PROVIDER NOTIFICATION
02/18/25 1618   Provider Notification   Provider Name/Title MD Lisset House   Method of Notification Electronic Page       RN sent electronic page to MD. Reviewed hemoglobin A1C and spot check BG results today. PT requesting apple juice. RN requesting if okay for apple juice and if any blood sugar monitoring or treatment should be ordered.  Per MD, okay for apple juice.

## 2025-02-19 LAB — HGB BLD-MCNC: 10.1 G/DL (ref 11.7–15.7)

## 2025-02-19 PROCEDURE — 250N000013 HC RX MED GY IP 250 OP 250 PS 637: Performed by: OBSTETRICS & GYNECOLOGY

## 2025-02-19 PROCEDURE — 120N000013 HC R&B IMCU

## 2025-02-19 PROCEDURE — 85018 HEMOGLOBIN: CPT | Performed by: OBSTETRICS & GYNECOLOGY

## 2025-02-19 PROCEDURE — 36415 COLL VENOUS BLD VENIPUNCTURE: CPT | Performed by: OBSTETRICS & GYNECOLOGY

## 2025-02-19 RX ORDER — PRENATAL VIT/IRON FUM/FOLIC AC 27MG-0.8MG
1 TABLET ORAL DAILY
Status: DISCONTINUED | OUTPATIENT
Start: 2025-02-19 | End: 2025-02-20 | Stop reason: HOSPADM

## 2025-02-19 RX ADMIN — IBUPROFEN 800 MG: 800 TABLET, FILM COATED ORAL at 06:15

## 2025-02-19 RX ADMIN — PSYLLIUM HUSK 1 PACKET: 3.4 POWDER ORAL at 08:07

## 2025-02-19 RX ADMIN — ACETAMINOPHEN 650 MG: 325 TABLET, FILM COATED ORAL at 06:15

## 2025-02-19 RX ADMIN — ACETAMINOPHEN 650 MG: 325 TABLET, FILM COATED ORAL at 12:24

## 2025-02-19 RX ADMIN — IBUPROFEN 800 MG: 800 TABLET, FILM COATED ORAL at 12:24

## 2025-02-19 RX ADMIN — PRENATAL VITAMINS-IRON FUMARATE 27 MG IRON-FOLIC ACID 0.8 MG TABLET 1 TABLET: at 08:07

## 2025-02-19 RX ADMIN — ACETAMINOPHEN 650 MG: 325 TABLET, FILM COATED ORAL at 19:50

## 2025-02-19 ASSESSMENT — ACTIVITIES OF DAILY LIVING (ADL)
ADLS_ACUITY_SCORE: 32
ADLS_ACUITY_SCORE: 28
ADLS_ACUITY_SCORE: 32
ADLS_ACUITY_SCORE: 28
ADLS_ACUITY_SCORE: 32
ADLS_ACUITY_SCORE: 28
ADLS_ACUITY_SCORE: 32
ADLS_ACUITY_SCORE: 28
ADLS_ACUITY_SCORE: 32
ADLS_ACUITY_SCORE: 28
ADLS_ACUITY_SCORE: 28
ADLS_ACUITY_SCORE: 32

## 2025-02-19 NOTE — ANESTHESIA POSTPROCEDURE EVALUATION
Patient: Amaal N Ali    Procedure: * No procedures listed *       Anesthesia Type:  Epidural    Note:  Disposition: Inpatient   Postop Pain Control: Uneventful            Sign Out: Well controlled pain   PONV: No   Neuro/Psych: Uneventful            Sign Out: Acceptable/Baseline neuro status   Airway/Respiratory: Uneventful            Sign Out: Acceptable/Baseline resp. status   CV/Hemodynamics: Uneventful            Sign Out: Acceptable CV status   Other NRE: NONE   DID A NON-ROUTINE EVENT OCCUR? No    Event details/Postop Comments:    Patient doing well.  Residual neuraxial block resolved with no reported numbness or paresthesias.  Ambulating, voiding, and eating without difficulty. Denies positional headache.  Pain well controlled. No bowel or bladder changes. Minimal back discomfort at epidural site. No apparent epidural complications.  Questions encouraged and answered.             Last vitals:  Vitals:    02/19/25 0019 02/19/25 0423 02/19/25 0757   BP: 139/71 (!) 148/65 139/67   Pulse:   78   Resp: 18  18   Temp: 98.2  F (36.8  C)  98.2  F (36.8  C)   SpO2:          Electronically Signed By: Clay Luo MD  February 19, 2025  10:16 AM

## 2025-02-19 NOTE — PLAN OF CARE
"Vitals stable. Transferred around 2330 via wheelchair. Oriented to room, call light and what to expect during stay. Patient able to ambulate and void adequately. Breast and formula feeding per preference. IV infiltrated and removed. Pain controlled with tylenol and ibuprofen with good relief. Positive infant attachment behaviors noted.     Problem: Adult Inpatient Plan of Care  Goal: Plan of Care Review  Description: The Plan of Care Review/Shift note should be completed every shift.  The Outcome Evaluation is a brief statement about your assessment that the patient is improving, declining, or no change.  This information will be displayed automatically on your shift  note.  Outcome: Progressing  Goal: Patient-Specific Goal (Individualized)  Description: You can add care plan individualizations to a care plan. Examples of Individualization might be:  \"Parent requests to be called daily at 9am for status\", \"I have a hard time hearing out of my right ear\", or \"Do not touch me to wake me up as it startles  me\".  Outcome: Progressing  Goal: Absence of Hospital-Acquired Illness or Injury  Outcome: Progressing  Intervention: Prevent Skin Injury  Recent Flowsheet Documentation  Taken 2/19/2025 0030 by Marcela Morales, RN  Body Position: position changed independently  Goal: Optimal Comfort and Wellbeing  Outcome: Progressing  Intervention: Provide Person-Centered Care  Recent Flowsheet Documentation  Taken 2/19/2025 0030 by Marcela Morales RN  Trust Relationship/Rapport:   care explained   choices provided   emotional support provided   empathic listening provided   questions answered   questions encouraged   reassurance provided   thoughts/feelings acknowledged  Goal: Readiness for Transition of Care  Outcome: Progressing   Goal Outcome Evaluation:                        "

## 2025-02-19 NOTE — PROGRESS NOTES
[facility]   Post-partum Progress Note    Name:  Manuel Phillip  MRN: [id number]    S:   Patient reports feeling well.  Pain  controlled.  Tolerating regular diet.  Ambulating without dizziness.  Voiding spontaneously. Lochia similar to menstrual flow. Had an episode of flashing lights in her vision yesterday but nothing since. Denies HA, CP, SOB, RUQ pain.    O:   Patient Vitals for the past 24 hrs:   BP Temp Temp src Pulse Resp SpO2 Height Weight   02/19/25 0757 139/67 98.2  F (36.8  C) Oral 78 18 -- -- --   02/19/25 0423 (!) 148/65 -- -- -- -- -- -- --   02/19/25 0019 139/71 98.2  F (36.8  C) Oral -- 18 -- -- --   02/18/25 2259 128/61 -- -- -- -- -- -- --   02/18/25 2244 117/56 -- -- -- -- -- -- --   02/18/25 2229 112/58 -- -- -- -- -- -- --   02/18/25 2214 128/58 -- -- -- -- -- -- --   02/18/25 2159 135/80 -- -- -- -- -- -- --   02/18/25 2144 139/61 -- -- -- -- -- -- --   02/18/25 2129 131/60 -- -- -- -- -- -- --   02/18/25 2117 -- 98  F (36.7  C) Oral -- 16 -- -- --   02/18/25 2050 -- 97.4  F (36.3  C) Oral -- 18 -- -- --   02/18/25 2014 130/72 -- -- -- -- 97 % -- --   02/18/25 2009 -- -- -- -- -- 98 % -- --   02/18/25 2007 117/68 -- -- -- -- -- -- --   02/18/25 2004 -- -- -- -- -- 98 % -- --   02/18/25 2002 137/68 -- -- -- -- -- -- --   02/18/25 2000 136/66 -- -- -- -- -- -- --   02/18/25 1959 -- -- -- -- -- 98 % -- --   02/18/25 1958 133/67 -- -- -- -- -- -- --   02/18/25 1956 137/70 98.7  F (37.1  C) Oral -- 16 -- -- --   02/18/25 1954 137/71 -- -- -- -- 98 % -- --   02/18/25 1951 138/73 -- -- -- -- -- -- --   02/18/25 1937 -- -- -- -- -- 100 % -- --   02/18/25 1929 (!) 140/73 -- -- -- -- -- -- --   02/18/25 1903 -- -- -- -- -- 100 % -- --   02/18/25 1858 -- -- -- -- -- 100 % -- --   02/18/25 1854 (!) 148/71 -- -- -- -- -- -- --   02/18/25 1853 -- -- -- -- -- 100 % -- --   02/18/25 1848 -- -- -- -- -- 100 % -- --   02/18/25 1843 -- -- -- -- -- 100 % -- --   02/18/25 1838 -- -- -- -- -- 100 % -- --  "  02/18/25 1833 -- -- -- -- -- 100 % -- --   02/18/25 1828 -- -- -- -- -- 100 % -- --   02/18/25 1823 134/72 97.8  F (36.6  C) Oral -- 17 100 % -- --   02/18/25 1818 -- -- -- -- -- 100 % -- --   02/18/25 1813 -- -- -- -- -- 100 % -- --   02/18/25 1803 -- -- -- -- -- 99 % -- --   02/18/25 1758 -- -- -- -- -- 99 % -- --   02/18/25 1753 127/59 -- -- -- -- 100 % -- --   02/18/25 1748 -- -- -- -- -- 99 % -- --   02/18/25 1745 -- -- -- -- -- 99 % -- --   02/18/25 1738 -- -- -- -- -- 100 % -- --   02/18/25 1733 -- -- -- -- -- 99 % -- --   02/18/25 1729 137/63 97.7  F (36.5  C) Oral -- 16 -- -- --   02/18/25 1728 -- -- -- -- -- 99 % -- --   02/18/25 1723 -- -- -- -- -- 97 % -- --   02/18/25 1718 -- -- -- -- -- 97 % -- --   02/18/25 1627 103/56 98.2  F (36.8  C) Oral -- 17 -- -- --   02/18/25 1623 103/56 -- -- -- -- -- -- --   02/18/25 1619 103/55 -- -- -- -- -- -- --   02/18/25 1615 99/55 -- -- -- -- 99 % -- --   02/18/25 1613 99/55 -- -- -- -- 99 % -- --   02/18/25 1608 105/57 -- -- -- -- -- -- --   02/18/25 1602 106/56 -- -- -- -- -- -- --   02/18/25 1600 110/51 -- -- -- -- -- -- --   02/18/25 1558 99/56 -- -- -- -- -- -- --   02/18/25 1556 100/53 -- -- -- -- -- -- --   02/18/25 1553 119/60 -- -- -- -- -- -- --   02/18/25 1546 139/67 -- -- -- -- -- -- --   02/18/25 1531 125/59 97.5  F (36.4  C) Oral -- 18 -- -- --   02/18/25 1331 -- -- -- -- -- -- 1.803 m (5' 11\") 120.4 kg (265 lb 8 oz)   02/18/25 1325 -- 98.2  F (36.8  C) Oral 81 19 99 % -- --   02/18/25 1323 138/80 -- -- -- -- -- -- --     Gen:  Resting comfortably, NAD  CV:  Well perfused  Pulm:  Non-labored breathing. No cough or audible wheezing.   Abd:  Soft, appropriately tender to palpation, non-distended.  Fundus below umbilicus, firm, and appropriately tender.  Ext:  Non-tender, trace+ LE edema b/l    Recent Labs   Lab Test 02/19/25  0643 02/18/25 2007 02/18/25  1455   HGB 10.1* 10.4* 10.9*     Recent Labs   Lab Test 02/18/25 2007 02/18/25  1455 12/17/24  1433 "    275 277     Recent Labs   Lab Test 25  1639 10/12/22  0926   ALT 11 13 21     Recent Labs   Lab Test 25  1639 10/12/22  0926   AST 28 25 16     Recent Labs   Lab Test 25  1639 10/12/22  0926   CR 0.58 0.83 0.82     A/P:  45 year old  PPD#1 s/p . Pregnancy notable for IVF, possibly GDM. Intrapartum/Postpartum course notable for developing PreE w/o SF. Meeting postpartum goals. Continue with routine postpartum management.     #PreE w/o SF  - BP predominantly normal  - No medications currently  - Episode of flashing lights yesterday but not other symptoms  - HELLP labs wnl   - Mag if meeting SF  - Discharge w/ BP cuff    #GDM? - Never finished GTT. Will order fasting GTT for tomorrow morning. Consider 6wk 2h OGTT    #Routine Postpartum  Pain: Well-controlled with scheduled tylenol, ibuprofen,   Hgb: 10.9 > 10.1. VSS as noted above, asymptomatic. Will discharge with PO Fe if hgb < 10  GI:  Regular diet. Bowel regimen. Encourage hydration and ambulation.   PPx:  Encouraged ambulation   Rh: Positive  Baby: In room, doing well  Feed: Breast and formula  BC: Not discussed  Dispo: Plan for home on PPD#2.    Bill Calderon MD MPH  [facility]  2025 11:29 AM

## 2025-02-19 NOTE — LACTATION NOTE
Lactation Visit: Infant was due to eat per the mother's report. Infant has been too sleepy to breast feed and she has been mainly formula feeding infant but she would like to breast feed. Infant has been very spitty crib had large volume of wet spit up and then infant began gagging and spitting up in mothers arms. Writer bulb suctioned mouth and infant continued to gag and appeared to be having a littlte difficulty clearing ,stomach was slightly distended as well. Infant brought to nursery for further evaluation under warmer and primary RN brought in to assess infant. Infant relaxed and had no further spit ups prior to hand off to primary RN. Lactation to follow-up when infant is ready to eat.

## 2025-02-19 NOTE — PROVIDER NOTIFICATION
02/18/25 2041   Provider Notification   Provider Name/Title MD JESSEE COLBERT   Method of Notification Electronic Page       RN sent notification to MD that PT is complete, setting up to push

## 2025-02-19 NOTE — PROVIDER NOTIFICATION
02/18/25 1948   Provider Notification   Provider Name/Title IVON ROONEY   Method of Notification At Bedside       MDA at bedside for evaluation of PT pain and rebolus of epidural.

## 2025-02-19 NOTE — PROVIDER NOTIFICATION
02/18/25 1943   Provider Notification   Provider Name/Title MD JESSEE COLBERT   Method of Notification Phone       RN sent status update to MD via electronic page at 1935. Reviewed SVE 5.5/80-90/-1. Reviewed PT report of heartburn and blood pressures (x2 in 140s). RN confirming if any blood sugars should be monitored.    MD called back at 1943. TORB for CBC with platelets, CMP, urine protein. TORB for 1000 mg PO Tums TID PRN for heartburn.

## 2025-02-19 NOTE — PROVIDER NOTIFICATION
02/18/25 2335   Provider Notification   Provider Name/Title MD Lisset House   Method of Notification Electronic Page       RN sent electronic page to MD notifying of urine protein result 0.3.

## 2025-02-19 NOTE — L&D DELIVERY NOTE
"Delivery Summary    45 year old  at 39w1d admitted for IOL for IVF and AMA.  Labored after AROM and pitocin.  Epidural for pain control.  Delivered vigorous female infant with apgars pending, weight pending.   Delayed cord clamping.  Placenta spontaneous, intact with 3VC.  First degree perineal laceration, repaired with 3-0 vicryl in the standard fashion and with a figure of X.  Very superficial left periurethral laceration, hemostatic and not repaired.  QBL 50cc.  \"Ronit\"    Constanza House MD, MPH  [facility]         Ali, Female-Amaal [[id number]]      Labor Event Times      Dilation complete date: 25 Complete time:  8:38 PM   Start pushing date/time: 2025          Labor Events     labor?: No   steroids: None  Labor Type: Induction/Cervical ripening  Predominate monitoring during 1st stage: continuous electronic fetal monitoring     Antibiotics received during labor?: No       Rupture date/time:     Rupture type: Artificial Rupture of Membranes     Induction: AROM  Induction date/time:      Cervical ripening date/time:           Augmentation: Oxytocin  Indications for augmentation: Ineffective Contraction Pattern       Delivery/Placenta Date and Time      Delivery Date: 25 Delivery Time:  8:55 PM   Placenta Date/Time: 2025  9:00 PM  Oxytocin given at the time of delivery: after delivery of baby  Delivering clinician: Constanza House MD   Other personnel present at delivery:  Provider Role   Modesta Bermudez, RN Delivery Nurse   Theresa An, RN Delivery Assist   Nafisa Smith              Vaginal Counts       Initial count performed by 2 team members:  Two Team Members   MD JESSEE BERMUDEZ         Needles Suture Needles Sponges (RETIRED) Instruments   Initial counts 2  5    Added to count  1     Relief counts       Final counts 2 1 5            Placed during labor Accounted for at the end of labor   FSE Yes Yes " "  IUPC No NA   Cervidil No NA                  Final count performed by 2 team members:  Two Team Members   Dr. Lacy An RN      Final count correct?: Yes       Cord      Vessels: 3 Vessels    Cord Complications: None               Cord Blood Disposition: Discard    Gases Sent?: No    Delayed cord clamping?: Yes    Cord Clamping Delay (seconds): 31-60 seconds    Stem cell collection?: No           Mosinee Measurements      Weight: 7 lb 8 oz Length: 1' 9\"     Head circumference: 34 cm           Labor Events and Shoulder Dystocia    Fetal Tracing Prior to Delivery: Category 2, Category 1  Fetal Tracing Comments: decel just prior to delivery  Shoulder dystocia present?: Neg       Delivery (Maternal) (Provider to Complete) ([id number])    Episiotomy: None  Perineal lacerations: 1st Repaired?: Yes     Periurethral laceration: left Repaired?: No   Repair suture: 3-0 Vicryl  Genital tract inspection done: Pos       Blood Loss  Mother: Manuel Phillip N #[id number]     Start of Mother's Information      Delivery Blood Loss   Intrapartum & Postpartum: 25 0855 - 25    Delivery Admission: 25 0855 - 25         Intrapartum & Postpartum Delivery Admission    Delivery QBL (mL) Hospital Encounter 50 mL 50 mL    Total  50 mL 50 mL               End of Mother's Information  Mother: Manuel Phillip N #[id number]                Delivery - Provider to Complete ([id number])    Delivering clinician: Constanza House MD  Delivery Type (Choose the 1 that will go to the Birth History): Vaginal, Spontaneous                         Other personnel:  Provider Role   Modesta Bermudez RN Delivery Nurse   Theresa An RN Delivery Assist   Luis Nafisa                     Placenta    Date/Time: 2025  9:00 PM  Removal: Spontaneous  Disposition: Hospital disposal             Anesthesia    Method: Epidural                    Presentation and Position    Presentation: Vertex     Occiput Anterior "                     Constanza House MD

## 2025-02-19 NOTE — PLAN OF CARE
"Goal Outcome Evaluation:      Plan of Care Reviewed With: patient, spouse    Overall Patient Progress: improvingOverall Patient Progress: improving    Outcome Evaluation: Vaginal delivery of baby girl. Fundal checks WDL. Education provided regarding early and frequent removal of milk to encourage supply, parents desiring to start first feeding with formula via bottle. First meal post delivery well tolerated. Up to bathroom with assistance of eliazar stedy. No urge to void at this time, plan to bladder scan upon transfer. Transferred to postpartum [facility] at 2325. Report to RYANN Medrano.      Problem: Adult Inpatient Plan of Care  Goal: Plan of Care Review  Description: The Plan of Care Review/Shift note should be completed every shift.  The Outcome Evaluation is a brief statement about your assessment that the patient is improving, declining, or no change.  This information will be displayed automatically on your shift  note.  Outcome: Progressing  Flowsheets (Taken 2/18/2025 2329)  Outcome Evaluation: Vaginal delivery of baby girl. Fundal checks WDL. Education provided regarding early and frequent removal of milk to encourage supply, parents desiring to start first feeding with formula via bottle. First meal post delivery well tolerated. Up to bathroom with assistance of eliazar stedy. No urge to void at this time, plan to bladder scan upon transfer. Transferred to postpartum 432 at 2325.  Plan of Care Reviewed With:   patient   spouse  Overall Patient Progress: improving  Goal: Patient-Specific Goal (Individualized)  Description: You can add care plan individualizations to a care plan. Examples of Individualization might be:  \"Parent requests to be called daily at 9am for status\", \"I have a hard time hearing out of my right ear\", or \"Do not touch me to wake me up as it startles  me\".  Outcome: Progressing  Flowsheets (Taken 2/18/2025 2329)  Individualized Care Needs: desires epidural  Patient/Family-Specific Goals (Include " Timeframe): eat regular food after delivery  Goal: Absence of Hospital-Acquired Illness or Injury  Outcome: Progressing  Intervention: Prevent Skin Injury  Recent Flowsheet Documentation  Taken 2025 153 by Modesta Bermudez RN  Body Position: position changed independently  Intervention: Prevent Infection  Recent Flowsheet Documentation  Taken 2025 153 by Modesta Bermudez RN  Infection Prevention:   single patient room provided   rest/sleep promoted  Goal: Optimal Comfort and Wellbeing  Outcome: Progressing  Intervention: Monitor Pain and Promote Comfort  Recent Flowsheet Documentation  Taken 2025 by Modesta Bermudez RN  Pain Management Interventions: (requesting MDA to bedside for evaluation of pain and rebolus of epidural) MD notified (comment)  Taken 2025 by Modesta Bermudez RN  Pain Management Interventions: (in process for epidural) --  Intervention: Provide Person-Centered Care  Recent Flowsheet Documentation  Taken 2025 153 by Modesta Bermudez RN  Trust Relationship/Rapport:   care explained   choices provided   emotional support provided   empathic listening provided   questions answered   questions encouraged   reassurance provided   thoughts/feelings acknowledged  Goal: Readiness for Transition of Care  Outcome: Progressing     Problem: [place]  Goal: Optimal Circumcision Site Healing  Outcome: Unable to Meet  Goal: Glucose Stability  Outcome: Unable to Meet  Goal: Demonstration of Attachment Behaviors  Outcome: Unable to Meet  Goal: Absence of Infection Signs and Symptoms  Outcome: Unable to Meet  Intervention: Prevent or Manage Infection  Recent Flowsheet Documentation  Taken 2025 153 by Modesta Bermudez RN  Infection Prevention:   single patient room provided   rest/sleep promoted  Goal: Effective Oral Intake  Outcome: Unable to Meet  Goal: Optimal Level of Comfort and Activity  Outcome: Unable to Meet  Goal: Effective Oxygenation and  Ventilation  Outcome: Unable to Meet  Goal: Skin Health and Integrity  Outcome: Unable to Meet  Goal: Temperature Stability  Outcome: Unable to Meet     Problem: Labor  Goal: Hemostasis  Outcome: Met  Goal: Stable Fetal Wellbeing  Outcome: Met  Intervention: Promote and Monitor Fetal Wellbeing  Recent Flowsheet Documentation  Taken 2/18/2025 1532 by Modesta Bermudez RN  Body Position: position changed independently  Goal: Effective Progression to Delivery  Outcome: Met  Goal: Absence of Infection Signs and Symptoms  Outcome: Met  Intervention: Prevent or Manage Infection  Recent Flowsheet Documentation  Taken 2/18/2025 1532 by Modesta Bermudez RN  Infection Prevention:   single patient room provided   rest/sleep promoted  Goal: Acceptable Pain Control  Outcome: Met  Goal: Normal Uterine Contraction Pattern  Outcome: Met     Problem: Skin Injury Risk Increased  Goal: Skin Health and Integrity  Outcome: Progressing  Intervention: Optimize Skin Protection  Recent Flowsheet Documentation  Taken 2/18/2025 1532 by Modesta Bermudez RN  Activity Management: ambulated in room     Problem: Postpartum (Vaginal Delivery)  Goal: Successful Parent Role Transition  Outcome: Progressing  Goal: Hemostasis  Outcome: Progressing  Goal: Absence of Infection Signs and Symptoms  Outcome: Progressing  Goal: Anesthesia/Sedation Recovery  Outcome: Progressing  Goal: Optimal Pain Control and Function  Outcome: Progressing  Intervention: Prevent or Manage Pain  Recent Flowsheet Documentation  Taken 2/18/2025 1937 by Modesta Bermudez RN  Pain Management Interventions: (requesting MDA to bedside for evaluation of pain and rebolus of epidural) MD notified (comment)  Taken 2/18/2025 1538 by Modesta Bermudez RN  Pain Management Interventions: (in process for epidural) --  Goal: Effective Urinary Elimination  Outcome: Not Progressing

## 2025-02-20 VITALS
SYSTOLIC BLOOD PRESSURE: 125 MMHG | OXYGEN SATURATION: 97 % | TEMPERATURE: 97.5 F | RESPIRATION RATE: 18 BRPM | WEIGHT: 264.5 LBS | BODY MASS INDEX: 37.03 KG/M2 | HEIGHT: 71 IN | HEART RATE: 67 BPM | DIASTOLIC BLOOD PRESSURE: 58 MMHG

## 2025-02-20 PROBLEM — O14.90 PREECLAMPSIA: Status: ACTIVE | Noted: 2025-02-20

## 2025-02-20 LAB — GLUCOSE BLDC GLUCOMTR-MCNC: 104 MG/DL (ref 70–99)

## 2025-02-20 PROCEDURE — 250N000013 HC RX MED GY IP 250 OP 250 PS 637: Performed by: OBSTETRICS & GYNECOLOGY

## 2025-02-20 RX ADMIN — PRENATAL VITAMINS-IRON FUMARATE 27 MG IRON-FOLIC ACID 0.8 MG TABLET 1 TABLET: at 08:01

## 2025-02-20 RX ADMIN — ACETAMINOPHEN 650 MG: 325 TABLET, FILM COATED ORAL at 06:38

## 2025-02-20 RX ADMIN — PSYLLIUM HUSK 1 PACKET: 3.4 POWDER ORAL at 08:01

## 2025-02-20 ASSESSMENT — ACTIVITIES OF DAILY LIVING (ADL)
ADLS_ACUITY_SCORE: 31
ADLS_ACUITY_SCORE: 32
ADLS_ACUITY_SCORE: 31
ADLS_ACUITY_SCORE: 32
ADLS_ACUITY_SCORE: 32
ADLS_ACUITY_SCORE: 31
ADLS_ACUITY_SCORE: 31

## 2025-02-20 NOTE — PLAN OF CARE
"Goal Outcome Evaluation:      Plan of Care Reviewed With: patient    Overall Patient Progress: improvingOverall Patient Progress: improving         VSS, PP checks WNL. Voiding without difficulty. Up ad jimbo. Has showered. Pain well-controlled by PRN Tylenol and ibuprofen. Breastfeeding and formula-feeding. No headache, dizziness, changes in vision, difficulty in breathing or RUQ pain noted. Independent with infant cares.     and 2.6 yo daughter in room.    BG at 0600H= 104        Problem: Adult Inpatient Plan of Care  Goal: Plan of Care Review  Description: The Plan of Care Review/Shift note should be completed every shift.  The Outcome Evaluation is a brief statement about your assessment that the patient is improving, declining, or no change.  This information will be displayed automatically on your shift  note.  Outcome: Progressing  Flowsheets (Taken 2/20/2025 0400)  Plan of Care Reviewed With: patient  Overall Patient Progress: improving  Goal: Patient-Specific Goal (Individualized)  Description: You can add care plan individualizations to a care plan. Examples of Individualization might be:  \"Parent requests to be called daily at 9am for status\", \"I have a hard time hearing out of my right ear\", or \"Do not touch me to wake me up as it startles  me\".  Outcome: Progressing  Goal: Absence of Hospital-Acquired Illness or Injury  Outcome: Progressing  Intervention: Prevent Skin Injury  Recent Flowsheet Documentation  Taken 2/20/2025 0033 by Lou Leyva RN  Body Position: position changed independently  Intervention: Prevent Infection  Recent Flowsheet Documentation  Taken 2/20/2025 0033 by Lou Leyva RN  Infection Prevention:   hand hygiene promoted   rest/sleep promoted  Goal: Optimal Comfort and Wellbeing  Outcome: Progressing  Intervention: Monitor Pain and Promote Comfort  Recent Flowsheet Documentation  Taken 2/19/2025 1950 by Lou Leyva RN  Pain Management Interventions: " medication (see MAR)  Intervention: Provide Person-Centered Care  Recent Flowsheet Documentation  Taken 2/20/2025 0033 by Lou Leyva RN  Trust Relationship/Rapport:   care explained   choices provided  Goal: Readiness for Transition of Care  Outcome: Progressing     Problem: Skin Injury Risk Increased  Goal: Skin Health and Integrity  Outcome: Progressing  Intervention: Optimize Skin Protection  Recent Flowsheet Documentation  Taken 2/20/2025 0033 by Lou Leyva RN  Activity Management: up ad jimbo  Head of Bed (HOB) Positioning: HOB at 60-90 degrees     Problem: Postpartum (Vaginal Delivery)  Goal: Successful Parent Role Transition  Outcome: Progressing  Intervention: Support Parent Role Transition  Recent Flowsheet Documentation  Taken 2/20/2025 0033 by Lou Leyva RN  Supportive Measures: active listening utilized  Parent-Child Attachment Promotion: caring behavior modeled  Goal: Hemostasis  Outcome: Progressing  Goal: Absence of Infection Signs and Symptoms  Outcome: Progressing  Intervention: Prevent or Manage Infection  Recent Flowsheet Documentation  Taken 2/20/2025 0033 by Lou Leyva RN  Infection Management: aseptic technique maintained  Goal: Anesthesia/Sedation Recovery  Outcome: Progressing  Goal: Optimal Pain Control and Function  Outcome: Progressing  Intervention: Prevent or Manage Pain  Recent Flowsheet Documentation  Taken 2/20/2025 0033 by Lou Leyva RN  Perineal Care:   perineal hygiene encouraged   perineal spray bottle/warm water use encouraged  Taken 2/19/2025 1950 by Lou Leyva RN  Pain Management Interventions: medication (see MAR)  Goal: Effective Urinary Elimination  Outcome: Progressing

## 2025-02-20 NOTE — DISCHARGE SUMMARY
"[facility] OB Postpartum  Discharge Note    S:  Patient without complaints.  Minimal lochia.    O:  Blood pressure 125/58, pulse 67, temperature 97.5  F (36.4  C), temperature source Oral, resp. rate 18, height 1.803 m (5' 11\"), weight 120.4 kg (265 lb 8 oz), last menstrual period 05/15/2024, SpO2 97%, unknown if currently breastfeeding.        Urine output adequate        Abdomen - Fundus firm, at U-2, nontender        Extremities - No calf tenderness    Hemoglobin   Date Value Ref Range Status   2025 10.1 (L) 11.7 - 15.7 g/dL Final   2014 12.0 11.7 - 15.7 g/dL Final   ]     A:   Postpartum Day# 2, s/p Vaginal delivery - doing well    P:  1)  Discharge home, F/U 6 weeks w/ Primary OB        2)  PreEclampsia w/o SF. No antihypertensives indicated, normotensive. Discharge with BP cuff for daily home testing, should report values to clinic in 1 week. Home health visit ordered.         3)  Discharge meds: over the counter tylenol, ibuprofen, stool softener    Meera Chan MD    Patient Instructions:  Activity: the patient was instructed on pelvic rest for 6 weeks.  Nothing in the vagina. No tampons, douching, or intercourse. She should notify her physician with temperature greater than 100.4 degrees, and if she is having any brisk vaginal bleeding where she soaks through greater than 1 pad per hour for more than 2 hours, if any area on her breast becomes red or painful, or if her pain is no longer controlled by pain medications.  Diet as tolerated.  Discussed symptoms of post-partum blues and depression and urged her to contact us immediately should that become a concern.   "

## 2025-02-20 NOTE — DISCHARGE INSTRUCTIONS
Warning Signs after Having a Baby    Keep this paper on your fridge or somewhere else where you can see it.    Call your provider if you have any of these symptoms up to 12 weeks after having your baby.    Thoughts of hurting yourself or your baby  Pain in your chest or trouble breathing  Severe headache not helped by pain medicine  Eyesight concerns (blurry vision, seeing spots or flashes of light, other changes to eyesight)  Fainting, shaking or other signs of a seizure    Call 9-1-1 if you feel that it is an emergency.     The symptoms below can happen to anyone after giving birth. They can be very serious. Call your provider if you have any of these warning signs.    Losing too much blood (hemorrhage)    Call your provider if you soak through a pad in less than an hour or pass blood clots bigger than a golf ball. These may be signs that you are bleeding too much.    Blood clots in the legs or lungs    After you give birth, your body naturally clots its blood to help prevent blood loss. Sometimes this increased clotting can happen in other areas of the body, like the legs or lungs. This can block your blood flow and be very dangerous.     Call your provider if you:  Have a red, swollen spot on the back of your leg that is warm or painful when you touch it.   Are coughing up blood.     Infection    Call your provider if you have any of these symptoms:  Fever of 100.4 F (38 C) or higher.  Pain or redness around your stitches if you had an incision.   Any yellow, white, or green fluid coming from places where you had stitches or surgery.    Mood Problems (postpartum depression)    Many people feel sad or have mood changes after having a baby. But for some people, these mood swings are worse.     Call your provider right away if you feel so anxious or nervous that you can't care for yourself or your baby.    Preeclampsia (high blood pressure)    Even if you didn't have high blood pressure when you were pregnant, you  are at risk for the high blood pressure disease called preeclampsia. This risk can last up to 12 weeks after giving birth.     Call your provider if you have:   Pain on your right side under your rib cage  Sudden swelling in the hands and face    Remember: You know your body. If something doesn't feel right, get medical help.     For informational purposes only. Not to replace the advice of your health care provider. Copyright 2020 [facility]. All rights reserved. Clinically reviewed by Naila Dacosta RNC-OB, MSN. [employer] [id number] - Rev 02/23.

## 2025-02-20 NOTE — PLAN OF CARE
VSS on room air. Fundus/lochia WDL. Voiding appropriately and ambulating independently. Pain adequately controlled with PRN medications. Formula feeding every 3 hours, about 10-15mL Infant has been very spitty, so encouraged lots of skin to skin and burping mid and post feed. S/o at bedside, supportive.   Goal Outcome Evaluation:      Plan of Care Reviewed With: patient    Overall Patient Progress: improving      Problem: Adult Inpatient Plan of Care  Goal: Absence of Hospital-Acquired Illness or Injury  Outcome: Progressing  Intervention: Prevent Skin Injury  Recent Flowsheet Documentation  Taken 2/19/2025 0757 by Shahnaz Wei RN  Body Position:   position changed independently   position maintained  Intervention: Prevent and Manage VTE (Venous Thromboembolism) Risk  Recent Flowsheet Documentation  Taken 2/19/2025 0800 by Shahnaz Wei RN  VTE Prevention/Management: (ambulating) other (see comments)  Intervention: Prevent Infection  Recent Flowsheet Documentation  Taken 2/19/2025 0800 by Shahnaz Wei RN  Infection Prevention:   single patient room provided   rest/sleep promoted   personal protective equipment utilized   hand hygiene promoted   equipment surfaces disinfected   environmental surveillance performed     Problem: Adult Inpatient Plan of Care  Goal: Optimal Comfort and Wellbeing  Outcome: Progressing  Intervention: Monitor Pain and Promote Comfort  Recent Flowsheet Documentation  Taken 2/19/2025 1315 by Shahnaz Wei RN  Pain Management Interventions: around-the-clock dosing utilized  Intervention: Provide Person-Centered Care  Recent Flowsheet Documentation  Taken 2/19/2025 0800 by Shahnaz Wei RN  Trust Relationship/Rapport:   care explained   choices provided   emotional support provided   empathic listening provided   questions answered   questions encouraged   reassurance provided   thoughts/feelings acknowledged     Problem: Postpartum (Vaginal Delivery)  Goal: Absence of Infection Signs and  Symptoms  Outcome: Progressing  Intervention: Prevent or Manage Infection  Recent Flowsheet Documentation  Taken 2/19/2025 0800 by Shahnaz Wei, RN  Infection Management: aseptic technique maintained

## 2025-02-20 NOTE — PROGRESS NOTES
Public Health Nurse (PHN) in to see patient to discuss [facility] programs. Patient is not interested in referral for a nurse visit at this time but will reach out to [facility] if interested in scheduling a nurse visit. PHN discussed [facility] community resource guide and rack cards and left these resources with patient.

## 2025-03-07 ENCOUNTER — MEDICAL CORRESPONDENCE (OUTPATIENT)
Dept: HEALTH INFORMATION MANAGEMENT | Facility: CLINIC | Age: 45
End: 2025-03-07
Payer: COMMERCIAL

## 2025-06-07 ENCOUNTER — HOSPITAL ENCOUNTER (EMERGENCY)
Facility: CLINIC | Age: 45
Discharge: HOME | End: 2025-06-07
Attending: EMERGENCY MEDICINE
Payer: COMMERCIAL

## 2025-06-07 ENCOUNTER — HOSPITAL ENCOUNTER (EMERGENCY)
Facility: CLINIC | Age: 45
Discharge: HOME | End: 2025-06-07
Attending: EMERGENCY MEDICINE | Admitting: EMERGENCY MEDICINE
Payer: COMMERCIAL

## 2025-06-07 VITALS
SYSTOLIC BLOOD PRESSURE: 106 MMHG | BODY MASS INDEX: 37.08 KG/M2 | RESPIRATION RATE: 18 BRPM | OXYGEN SATURATION: 100 % | HEART RATE: 72 BPM | DIASTOLIC BLOOD PRESSURE: 52 MMHG | WEIGHT: 265.88 LBS | TEMPERATURE: 97.6 F

## 2025-06-07 VITALS
SYSTOLIC BLOOD PRESSURE: 154 MMHG | OXYGEN SATURATION: 97 % | BODY MASS INDEX: 36.96 KG/M2 | TEMPERATURE: 98 F | RESPIRATION RATE: 18 BRPM | HEART RATE: 82 BPM | DIASTOLIC BLOOD PRESSURE: 86 MMHG | WEIGHT: 265 LBS

## 2025-06-07 DIAGNOSIS — T78.40XA ALLERGIC REACTION, INITIAL ENCOUNTER: ICD-10-CM

## 2025-06-07 DIAGNOSIS — T65.91XA ALLERGIC REACTION TO CHEMICAL SUBSTANCE, ACCIDENTAL OR UNINTENTIONAL, INITIAL ENCOUNTER: ICD-10-CM

## 2025-06-07 PROCEDURE — 99283 EMERGENCY DEPT VISIT LOW MDM: CPT

## 2025-06-07 PROCEDURE — 99284 EMERGENCY DEPT VISIT MOD MDM: CPT

## 2025-06-07 PROCEDURE — 250N000013 HC RX MED GY IP 250 OP 250 PS 637: Performed by: EMERGENCY MEDICINE

## 2025-06-07 PROCEDURE — 250N000012 HC RX MED GY IP 250 OP 636 PS 637: Performed by: EMERGENCY MEDICINE

## 2025-06-07 RX ORDER — DIAPER,BRIEF,INFANT-TODD,DISP
EACH MISCELLANEOUS 2 TIMES DAILY
Qty: 28 G | Refills: 0 | Status: SHIPPED | OUTPATIENT
Start: 2025-06-07

## 2025-06-07 RX ORDER — DIPHENHYDRAMINE HCL 25 MG
25 TABLET ORAL EVERY 8 HOURS PRN
Qty: 45 TABLET | Refills: 0 | Status: SHIPPED | OUTPATIENT
Start: 2025-06-07

## 2025-06-07 RX ORDER — DIPHENHYDRAMINE HCL 25 MG
25 TABLET ORAL EVERY 8 HOURS PRN
Qty: 45 TABLET | Refills: 0 | Status: SHIPPED | OUTPATIENT
Start: 2025-06-07 | End: 2025-06-07

## 2025-06-07 RX ORDER — PREDNISONE 10 MG/1
TABLET ORAL
Qty: 32 TABLET | Refills: 0 | Status: SHIPPED | OUTPATIENT
Start: 2025-06-07

## 2025-06-07 RX ORDER — PREDNISONE 20 MG/1
40 TABLET ORAL ONCE
Status: COMPLETED | OUTPATIENT
Start: 2025-06-07 | End: 2025-06-07

## 2025-06-07 RX ORDER — FAMOTIDINE 20 MG/1
40 TABLET, FILM COATED ORAL ONCE
Status: COMPLETED | OUTPATIENT
Start: 2025-06-07 | End: 2025-06-07

## 2025-06-07 RX ORDER — PREDNISONE 10 MG/1
TABLET ORAL
Qty: 32 TABLET | Refills: 0 | Status: SHIPPED | OUTPATIENT
Start: 2025-06-07 | End: 2025-06-07

## 2025-06-07 RX ORDER — HYDROXYZINE HYDROCHLORIDE 25 MG/1
25 TABLET, FILM COATED ORAL ONCE
Status: COMPLETED | OUTPATIENT
Start: 2025-06-07 | End: 2025-06-07

## 2025-06-07 RX ORDER — DIAPER,BRIEF,INFANT-TODD,DISP
EACH MISCELLANEOUS 2 TIMES DAILY
Qty: 28 G | Refills: 0 | Status: SHIPPED | OUTPATIENT
Start: 2025-06-07 | End: 2025-06-07

## 2025-06-07 RX ADMIN — PREDNISONE 40 MG: 20 TABLET ORAL at 03:50

## 2025-06-07 RX ADMIN — FAMOTIDINE 40 MG: 20 TABLET ORAL at 03:49

## 2025-06-07 RX ADMIN — HYDROXYZINE HYDROCHLORIDE 25 MG: 25 TABLET ORAL at 03:49

## 2025-06-07 ASSESSMENT — ACTIVITIES OF DAILY LIVING (ADL)
ADLS_ACUITY_SCORE: 54
ADLS_ACUITY_SCORE: 54

## 2025-06-07 ASSESSMENT — COLUMBIA-SUICIDE SEVERITY RATING SCALE - C-SSRS
1. IN THE PAST MONTH, HAVE YOU WISHED YOU WERE DEAD OR WISHED YOU COULD GO TO SLEEP AND NOT WAKE UP?: NO
6. HAVE YOU EVER DONE ANYTHING, STARTED TO DO ANYTHING, OR PREPARED TO DO ANYTHING TO END YOUR LIFE?: NO
1. IN THE PAST MONTH, HAVE YOU WISHED YOU WERE DEAD OR WISHED YOU COULD GO TO SLEEP AND NOT WAKE UP?: NO
6. HAVE YOU EVER DONE ANYTHING, STARTED TO DO ANYTHING, OR PREPARED TO DO ANYTHING TO END YOUR LIFE?: NO
2. HAVE YOU ACTUALLY HAD ANY THOUGHTS OF KILLING YOURSELF IN THE PAST MONTH?: NO
2. HAVE YOU ACTUALLY HAD ANY THOUGHTS OF KILLING YOURSELF IN THE PAST MONTH?: NO

## 2025-06-07 NOTE — ED PROVIDER NOTES
Emergency Department Note      History of Present Illness     Chief Complaint  Allergic Reaction        HPI  Manuel Phillip is a 45 year old female who presents to the emergency room with arm pain and itching after getting red put on yesterday.  She states that she does typically get sachi but has never had the red color before and notes that she has noticed very specific areas where the sachi was placed that she has itching and redness.  Hydrocortisone cream helped slightly, but she is asking for additional help.  Denies any facial swelling, no hives or areas of irritation apart from directly over where the sachi tattoo is.      Independent Historian  no    Review of External Notes  Yes I have reviewed the patient's last office visit note from 4 - 1 - 25 the patient was seen by her OB/GYN in the antepartum setting for supervision of a normal pregnancy.      Past Medical History   Medical History and Problem List  Past Medical History:   Diagnosis Date    Asthma 06/09/2022    Female infertility     No pertinent past medical history 02/09/2022    Vaginal delivery 09/14/2022    Varicella     White classification A1 gestational diabetes mellitus (GDM), diet controlled 07/06/2022       Medications  albuterol (PROAIR HFA/PROVENTIL HFA/VENTOLIN HFA) 108 (90 Base) MCG/ACT inhaler  doxylamine (UNISOM) 25 MG TABS tablet  Multiple Vitamin (VITAMIN E/FOLIC ACID/B-6/B-12 PO)  Prenatal Vit-Fe Fumarate-FA (PRENATAL MULTIVITAMIN  PLUS IRON) 27-1 MG TABS        Surgical History   Past Surgical History:   Procedure Laterality Date    CHOLECYSTECTOMY, LAPOROSCOPIC  3/09    [facility] GENITAL SURG PROC, FEMALE UNLISTED      female circumcision         Physical Exam   Temp: 97.6  F (36.4  C) Temp src: Oral BP: 106/52 Pulse: 72   Resp: 18 SpO2: 100 %             Vitals: reviewed by me as above  General: Pt seen on \[facility] [place] [facility]\"", pleasant, cooperative, and alert to conversation  Eyes: Tracking well, clear conjunctiva BL  ENT: MMM, midline  trachea.   Lungs: No tachypnea, no accessory muscle use. No respiratory distress.   CV: Rate as above however bilateral upper extremities with red hand in place, scattered areas of erythema and slight redness noted as well.  MSK: no joint effusion.  No evidence of trauma, no purulence, slight swelling noted, no evidence of cellulitis, however faint hives noted.  No facial swelling or hives noted.  Skin: No rash  Neuro: Clear speech and no facial droop.  Psych: Not RIS, no e/o AH/VH              Optional/Additional Documentation  None      Medical Decision Making / Diagnosis         MDM  This is a very pleasant 45-year-old female who presents to the emergency room with appears to be a localized allergic reaction.  She has no evidence of anaphylaxis and is mentating appropriately.  I do think that given the time course and how it sounds like these entities cannot be washed off and will be present for weeks, to give her a taper of prednisone as well as Benadryl use if she is not breast-feeding.  She is also asking for a topical cream and I do think that an ointment would be preferred at this point given the propensity for this to be itchy and possibly excoriated, and she feels comfortable with this plan.  She knows that she will still have some symptoms and have asked her to follow-up with her regular doctor in the next 3 to 4 days for a checkup as well.  She is highly reliable appearing and I do think that she will come back to the ER if anything gets worse.  Red flags for when to do just that were discussed in detail.        Late entry: After discharge, patient and her friend seem to have gone to the pharmacy and after being told that there was a 40-minute wait to fill the prescriptions came back here and expressed frustration in not receiving oral medications prior to discharge.  Patient noted that she still had tingling possibly on her lips, but I see no evidence of any airway obstruction or any hives or swelling  noted to her face.  She has a normal sounding voice, and I do not see any worsening of her condition over the hour that she was gone.  I did my best to answer all other questions, and it sounds like the family perceived poor quality care and not having the oral medications given prior to discharge and I did my best to explain that this is not typical practice and how I am happy to give 1 now since there is a delay but overall if the medication will be taken for days to weeks a 1 hour delay is not typically thought to be deleterious.  Abruptly the family then stated that they would like to leave, which I obliged.    ICD-10 Codes:    ICD-10-CM    1. Allergic reaction, initial encounter  T78.40XA              Discharge Medications  New Prescriptions    DIPHENHYDRAMINE (BENADRYL) 25 MG TABLET    Take 1 tablet (25 mg) by mouth every 8 hours as needed for itching or allergies.    HYDROCORTISONE (CORTAID) 1 % EXTERNAL OINTMENT    Apply topically 2 times daily.    PREDNISONE (DELTASONE) 10 MG TABLET    Take 4 tablets daily for 5 days,  take 2 tablets daily for 3 days, take 1 tablet daily for 3 days, take half a tablet for 3 days.                  Ryder Chris MD  06/07/25 [id number]       Ryder Chris MD  06/07/25 [id number]

## 2025-06-07 NOTE — DISCHARGE INSTRUCTIONS
If you are breast-feeding, please do not take the Benadryl as it may dry you out.  However you can take the prednisone that we have given you, please take the full 2 weeks and come back to the ER immediately with any other concerns you have.  Please follow-up with your regular doctor in the next 1 week and also use the ointment that we have given you.  It is very important that you check in with your regular doctor in the next 1 week as we discussed

## 2025-06-07 NOTE — DISCHARGE INSTRUCTIONS
As we discussed, I do think you need to come back to the ER immediately if you have any shortness of breath or trouble breathing or trouble swallowing.  This is very important.  Otherwise continue with the previously given instructions.

## 2025-06-07 NOTE — ED TRIAGE NOTES
Pt discharged from the ER 40 min ago, pt was seen earlier for an allergic reaction. Pt reports the itching to hands is worse and now she has itching in her legs, and lips
